# Patient Record
Sex: FEMALE | Race: WHITE | Employment: OTHER | URBAN - NONMETROPOLITAN AREA
[De-identification: names, ages, dates, MRNs, and addresses within clinical notes are randomized per-mention and may not be internally consistent; named-entity substitution may affect disease eponyms.]

---

## 2023-06-24 ENCOUNTER — APPOINTMENT (OUTPATIENT)
Dept: CT IMAGING | Age: 88
DRG: 308 | End: 2023-06-24
Payer: MEDICARE

## 2023-06-24 ENCOUNTER — HOSPITAL ENCOUNTER (INPATIENT)
Age: 88
LOS: 3 days | Discharge: HOME HEALTH | DRG: 308 | End: 2023-06-27
Attending: EMERGENCY MEDICINE | Admitting: OPHTHALMOLOGY
Payer: MEDICARE

## 2023-06-24 ENCOUNTER — APPOINTMENT (OUTPATIENT)
Dept: GENERAL RADIOLOGY | Age: 88
DRG: 308 | End: 2023-06-24
Payer: MEDICARE

## 2023-06-24 DIAGNOSIS — J90 PLEURAL EFFUSION: ICD-10-CM

## 2023-06-24 DIAGNOSIS — I48.91 ATRIAL FIBRILLATION WITH RVR (HCC): Primary | ICD-10-CM

## 2023-06-24 DIAGNOSIS — I50.31 ACUTE DIASTOLIC CONGESTIVE HEART FAILURE (HCC): ICD-10-CM

## 2023-06-24 LAB
ALBUMIN SERPL BCG-MCNC: 3.7 G/DL (ref 3.5–5.1)
ALP SERPL-CCNC: 81 U/L (ref 38–126)
ALT SERPL W/O P-5'-P-CCNC: 103 U/L (ref 11–66)
ANION GAP SERPL CALC-SCNC: 14 MEQ/L (ref 8–16)
APTT PPP: 27.9 SECONDS (ref 22–38)
ARTERIAL PATENCY WRIST A: POSITIVE
AST SERPL-CCNC: 57 U/L (ref 5–40)
BACTERIA URNS QL MICRO: ABNORMAL /HPF
BASE EXCESS BLDA CALC-SCNC: -0.5 MMOL/L (ref -2.5–2.5)
BASOPHILS ABSOLUTE: 0.1 THOU/MM3 (ref 0–0.1)
BASOPHILS NFR BLD AUTO: 0.8 %
BDY SITE: ABNORMAL
BILIRUB CONJ SERPL-MCNC: < 0.2 MG/DL (ref 0–0.3)
BILIRUB SERPL-MCNC: 0.9 MG/DL (ref 0.3–1.2)
BILIRUB UR QL STRIP.AUTO: NEGATIVE
BUN SERPL-MCNC: 19 MG/DL (ref 7–22)
CALCIUM SERPL-MCNC: 9.3 MG/DL (ref 8.5–10.5)
CASTS #/AREA URNS LPF: ABNORMAL /LPF
CASTS 2: ABNORMAL /LPF
CHARACTER UR: CLEAR
CHLORIDE SERPL-SCNC: 96 MEQ/L (ref 98–111)
CO2 SERPL-SCNC: 23 MEQ/L (ref 23–33)
COLLECTED BY:: ABNORMAL
COLOR: YELLOW
CREAT SERPL-MCNC: 0.7 MG/DL (ref 0.4–1.2)
CRYSTALS URNS MICRO: ABNORMAL
DEPRECATED RDW RBC AUTO: 56.3 FL (ref 35–45)
DEVICE: ABNORMAL
EKG Q-T INTERVAL: 294 MS
EKG QRS DURATION: 82 MS
EKG QTC CALCULATION (BAZETT): 388 MS
EKG R AXIS: -119 DEGREES
EKG T AXIS: 76 DEGREES
EKG VENTRICULAR RATE: 105 BPM
EOSINOPHIL NFR BLD AUTO: 3.3 %
EOSINOPHILS ABSOLUTE: 0.3 THOU/MM3 (ref 0–0.4)
EPITHELIAL CELLS, UA: ABNORMAL /HPF
ERYTHROCYTE [DISTWIDTH] IN BLOOD BY AUTOMATED COUNT: 15.9 % (ref 11.5–14.5)
FLUAV RNA RESP QL NAA+PROBE: NOT DETECTED
FLUBV RNA RESP QL NAA+PROBE: NOT DETECTED
GFR SERPL CREATININE-BSD FRML MDRD: > 60 ML/MIN/1.73M2
GLUCOSE SERPL-MCNC: 120 MG/DL (ref 70–108)
GLUCOSE UR QL STRIP.AUTO: NEGATIVE MG/DL
HCO3 BLDA-SCNC: 22 MMOL/L (ref 23–28)
HCT VFR BLD AUTO: 44.6 % (ref 37–47)
HEPARIN UNFRACTIONATED: 0.05 U/ML (ref 0.3–0.7)
HEPARIN UNFRACTIONATED: 0.56 U/ML (ref 0.3–0.7)
HGB BLD-MCNC: 14.4 GM/DL (ref 12–16)
HGB UR QL STRIP.AUTO: ABNORMAL
IMM GRANULOCYTES # BLD AUTO: 0.04 THOU/MM3 (ref 0–0.07)
IMM GRANULOCYTES NFR BLD AUTO: 0.5 %
INR PPP: 0.99 (ref 0.85–1.13)
KETONES UR QL STRIP.AUTO: NEGATIVE
LIPASE SERPL-CCNC: 71.1 U/L (ref 5.6–51.3)
LYMPHOCYTES ABSOLUTE: 0.9 THOU/MM3 (ref 1–4.8)
LYMPHOCYTES NFR BLD AUTO: 11.5 %
MCH RBC QN AUTO: 31.2 PG (ref 26–33)
MCHC RBC AUTO-ENTMCNC: 32.3 GM/DL (ref 32.2–35.5)
MCV RBC AUTO: 96.7 FL (ref 81–99)
MISCELLANEOUS 2: ABNORMAL
MONOCYTES ABSOLUTE: 0.5 THOU/MM3 (ref 0.4–1.3)
MONOCYTES NFR BLD AUTO: 5.9 %
MRSA DNA SPEC QL NAA+PROBE: NEGATIVE
NEUTROPHILS NFR BLD AUTO: 78 %
NITRITE UR QL STRIP: POSITIVE
NRBC BLD AUTO-RTO: 0 /100 WBC
NT-PROBNP SERPL IA-MCNC: 4451 PG/ML (ref 0–449)
OSMOLALITY SERPL CALC.SUM OF ELEC: 269.8 MOSMOL/KG (ref 275–300)
PCO2 BLDA: 30 MMHG (ref 35–45)
PH BLDA: 7.48 [PH] (ref 7.35–7.45)
PH UR STRIP.AUTO: 7.5 [PH] (ref 5–9)
PLATELET # BLD AUTO: 250 THOU/MM3 (ref 130–400)
PMV BLD AUTO: 9.8 FL (ref 9.4–12.4)
PO2 BLDA: 78 MMHG (ref 71–104)
POTASSIUM SERPL-SCNC: 4.4 MEQ/L (ref 3.5–5.2)
PROCALCITONIN SERPL IA-MCNC: 0.1 NG/ML (ref 0.01–0.09)
PROT SERPL-MCNC: 5.4 G/DL (ref 6.1–8)
PROT UR STRIP.AUTO-MCNC: NEGATIVE MG/DL
RBC # BLD AUTO: 4.61 MILL/MM3 (ref 4.2–5.4)
RBC URINE: ABNORMAL /HPF
RENAL EPI CELLS #/AREA URNS HPF: ABNORMAL /[HPF]
SAO2 % BLDA: 97 %
SARS-COV-2 RNA RESP QL NAA+PROBE: NOT DETECTED
SEGMENTED NEUTROPHILS ABSOLUTE COUNT: 6 THOU/MM3 (ref 1.8–7.7)
SODIUM SERPL-SCNC: 133 MEQ/L (ref 135–145)
SP GR UR REFRACT.AUTO: 1.01 (ref 1–1.03)
T4 FREE SERPL-MCNC: 1.34 NG/DL (ref 0.93–1.76)
TROPONIN T: < 0.01 NG/ML
TSH SERPL DL<=0.005 MIU/L-ACNC: 4.46 UIU/ML (ref 0.4–4.2)
UROBILINOGEN, URINE: 0.2 EU/DL (ref 0–1)
WBC # BLD AUTO: 7.7 THOU/MM3 (ref 4.8–10.8)
WBC #/AREA URNS HPF: ABNORMAL /HPF
WBC #/AREA URNS HPF: ABNORMAL /[HPF]
YEAST LIKE FUNGI URNS QL MICRO: ABNORMAL

## 2023-06-24 PROCEDURE — 85610 PROTHROMBIN TIME: CPT

## 2023-06-24 PROCEDURE — 84145 PROCALCITONIN (PCT): CPT

## 2023-06-24 PROCEDURE — 93005 ELECTROCARDIOGRAM TRACING: CPT | Performed by: EMERGENCY MEDICINE

## 2023-06-24 PROCEDURE — 2060000000 HC ICU INTERMEDIATE R&B

## 2023-06-24 PROCEDURE — 85520 HEPARIN ASSAY: CPT

## 2023-06-24 PROCEDURE — 6360000002 HC RX W HCPCS: Performed by: STUDENT IN AN ORGANIZED HEALTH CARE EDUCATION/TRAINING PROGRAM

## 2023-06-24 PROCEDURE — 36415 COLL VENOUS BLD VENIPUNCTURE: CPT

## 2023-06-24 PROCEDURE — 85730 THROMBOPLASTIN TIME PARTIAL: CPT

## 2023-06-24 PROCEDURE — 85025 COMPLETE CBC W/AUTO DIFF WBC: CPT

## 2023-06-24 PROCEDURE — 82803 BLOOD GASES ANY COMBINATION: CPT

## 2023-06-24 PROCEDURE — 36600 WITHDRAWAL OF ARTERIAL BLOOD: CPT

## 2023-06-24 PROCEDURE — 87641 MR-STAPH DNA AMP PROBE: CPT

## 2023-06-24 PROCEDURE — 2580000003 HC RX 258: Performed by: EMERGENCY MEDICINE

## 2023-06-24 PROCEDURE — 71046 X-RAY EXAM CHEST 2 VIEWS: CPT

## 2023-06-24 PROCEDURE — 84484 ASSAY OF TROPONIN QUANT: CPT

## 2023-06-24 PROCEDURE — 83880 ASSAY OF NATRIURETIC PEPTIDE: CPT

## 2023-06-24 PROCEDURE — 80048 BASIC METABOLIC PNL TOTAL CA: CPT

## 2023-06-24 PROCEDURE — 84439 ASSAY OF FREE THYROXINE: CPT

## 2023-06-24 PROCEDURE — 87077 CULTURE AEROBIC IDENTIFY: CPT

## 2023-06-24 PROCEDURE — 87040 BLOOD CULTURE FOR BACTERIA: CPT

## 2023-06-24 PROCEDURE — 87186 SC STD MICRODIL/AGAR DIL: CPT

## 2023-06-24 PROCEDURE — 87086 URINE CULTURE/COLONY COUNT: CPT

## 2023-06-24 PROCEDURE — 2500000003 HC RX 250 WO HCPCS: Performed by: EMERGENCY MEDICINE

## 2023-06-24 PROCEDURE — 80076 HEPATIC FUNCTION PANEL: CPT

## 2023-06-24 PROCEDURE — 71275 CT ANGIOGRAPHY CHEST: CPT

## 2023-06-24 PROCEDURE — 87636 SARSCOV2 & INF A&B AMP PRB: CPT

## 2023-06-24 PROCEDURE — 6360000004 HC RX CONTRAST MEDICATION: Performed by: EMERGENCY MEDICINE

## 2023-06-24 PROCEDURE — 84443 ASSAY THYROID STIM HORMONE: CPT

## 2023-06-24 PROCEDURE — 6370000000 HC RX 637 (ALT 250 FOR IP): Performed by: STUDENT IN AN ORGANIZED HEALTH CARE EDUCATION/TRAINING PROGRAM

## 2023-06-24 PROCEDURE — 99285 EMERGENCY DEPT VISIT HI MDM: CPT

## 2023-06-24 PROCEDURE — 93010 ELECTROCARDIOGRAM REPORT: CPT | Performed by: INTERNAL MEDICINE

## 2023-06-24 PROCEDURE — 96374 THER/PROPH/DIAG INJ IV PUSH: CPT

## 2023-06-24 PROCEDURE — 87070 CULTURE OTHR SPECIMN AEROBIC: CPT

## 2023-06-24 PROCEDURE — 6360000002 HC RX W HCPCS: Performed by: EMERGENCY MEDICINE

## 2023-06-24 PROCEDURE — 83690 ASSAY OF LIPASE: CPT

## 2023-06-24 PROCEDURE — 81001 URINALYSIS AUTO W/SCOPE: CPT

## 2023-06-24 RX ORDER — HEPARIN SODIUM 1000 [USP'U]/ML
4000 INJECTION, SOLUTION INTRAVENOUS; SUBCUTANEOUS ONCE
Status: COMPLETED | OUTPATIENT
Start: 2023-06-24 | End: 2023-06-24

## 2023-06-24 RX ORDER — ONDANSETRON 2 MG/ML
4 INJECTION INTRAMUSCULAR; INTRAVENOUS EVERY 6 HOURS PRN
Status: DISCONTINUED | OUTPATIENT
Start: 2023-06-24 | End: 2023-06-27 | Stop reason: HOSPADM

## 2023-06-24 RX ORDER — ACETAMINOPHEN 650 MG/1
650 SUPPOSITORY RECTAL EVERY 6 HOURS PRN
Status: DISCONTINUED | OUTPATIENT
Start: 2023-06-24 | End: 2023-06-27 | Stop reason: HOSPADM

## 2023-06-24 RX ORDER — ACETAMINOPHEN 325 MG/1
650 TABLET ORAL EVERY 6 HOURS PRN
Status: DISCONTINUED | OUTPATIENT
Start: 2023-06-24 | End: 2023-06-27 | Stop reason: HOSPADM

## 2023-06-24 RX ORDER — KETOTIFEN FUMARATE 0.35 MG/ML
1 SOLUTION/ DROPS OPHTHALMIC PRN
Status: DISCONTINUED | OUTPATIENT
Start: 2023-06-24 | End: 2023-06-27 | Stop reason: HOSPADM

## 2023-06-24 RX ORDER — HEPARIN SODIUM 1000 [USP'U]/ML
4000 INJECTION, SOLUTION INTRAVENOUS; SUBCUTANEOUS PRN
Status: DISCONTINUED | OUTPATIENT
Start: 2023-06-24 | End: 2023-06-27 | Stop reason: HOSPADM

## 2023-06-24 RX ORDER — METOPROLOL TARTRATE 50 MG/1
50 TABLET, FILM COATED ORAL 2 TIMES DAILY
COMMUNITY

## 2023-06-24 RX ORDER — METOPROLOL TARTRATE 50 MG/1
50 TABLET, FILM COATED ORAL 2 TIMES DAILY
Status: DISCONTINUED | OUTPATIENT
Start: 2023-06-24 | End: 2023-06-27 | Stop reason: HOSPADM

## 2023-06-24 RX ORDER — HEPARIN SODIUM 10000 [USP'U]/100ML
5-30 INJECTION, SOLUTION INTRAVENOUS CONTINUOUS
Status: DISCONTINUED | OUTPATIENT
Start: 2023-06-24 | End: 2023-06-27 | Stop reason: HOSPADM

## 2023-06-24 RX ORDER — POLYETHYLENE GLYCOL 3350 17 G/17G
17 POWDER, FOR SOLUTION ORAL DAILY PRN
Status: DISCONTINUED | OUTPATIENT
Start: 2023-06-24 | End: 2023-06-27 | Stop reason: HOSPADM

## 2023-06-24 RX ORDER — LOSARTAN POTASSIUM 100 MG/1
100 TABLET ORAL DAILY
Status: ON HOLD | COMMUNITY
End: 2023-06-27 | Stop reason: HOSPADM

## 2023-06-24 RX ORDER — SODIUM CHLORIDE 9 MG/ML
INJECTION, SOLUTION INTRAVENOUS PRN
Status: DISCONTINUED | OUTPATIENT
Start: 2023-06-24 | End: 2023-06-27

## 2023-06-24 RX ORDER — TRIAMCINOLONE ACETONIDE 1 MG/G
CREAM TOPICAL 2 TIMES DAILY
COMMUNITY

## 2023-06-24 RX ORDER — CHLORAL HYDRATE 500 MG
CAPSULE ORAL DAILY
COMMUNITY

## 2023-06-24 RX ORDER — ONDANSETRON 4 MG/1
4 TABLET, ORALLY DISINTEGRATING ORAL EVERY 8 HOURS PRN
Status: DISCONTINUED | OUTPATIENT
Start: 2023-06-24 | End: 2023-06-27 | Stop reason: HOSPADM

## 2023-06-24 RX ORDER — HEPARIN SODIUM 1000 [USP'U]/ML
2000 INJECTION, SOLUTION INTRAVENOUS; SUBCUTANEOUS PRN
Status: DISCONTINUED | OUTPATIENT
Start: 2023-06-24 | End: 2023-06-27 | Stop reason: HOSPADM

## 2023-06-24 RX ORDER — DILTIAZEM HYDROCHLORIDE 5 MG/ML
10 INJECTION INTRAVENOUS ONCE
Status: COMPLETED | OUTPATIENT
Start: 2023-06-24 | End: 2023-06-24

## 2023-06-24 RX ORDER — SODIUM CHLORIDE 0.9 % (FLUSH) 0.9 %
5-40 SYRINGE (ML) INJECTION PRN
Status: DISCONTINUED | OUTPATIENT
Start: 2023-06-24 | End: 2023-06-27 | Stop reason: HOSPADM

## 2023-06-24 RX ORDER — BRIMONIDINE TARTRATE 2 MG/ML
1 SOLUTION/ DROPS OPHTHALMIC 3 TIMES DAILY
Status: DISCONTINUED | OUTPATIENT
Start: 2023-06-24 | End: 2023-06-27 | Stop reason: HOSPADM

## 2023-06-24 RX ORDER — BRIMONIDINE TARTRATE 2 MG/ML
1 SOLUTION/ DROPS OPHTHALMIC 2 TIMES DAILY
Status: DISCONTINUED | OUTPATIENT
Start: 2023-06-24 | End: 2023-06-24

## 2023-06-24 RX ORDER — LOSARTAN POTASSIUM 100 MG/1
100 TABLET ORAL DAILY
Status: DISCONTINUED | OUTPATIENT
Start: 2023-06-25 | End: 2023-06-25

## 2023-06-24 RX ORDER — BRIMONIDINE TARTRATE AND TIMOLOL MALEATE 2; 5 MG/ML; MG/ML
1 SOLUTION OPHTHALMIC NIGHTLY
COMMUNITY

## 2023-06-24 RX ORDER — SODIUM CHLORIDE 0.9 % (FLUSH) 0.9 %
5-40 SYRINGE (ML) INJECTION EVERY 12 HOURS SCHEDULED
Status: DISCONTINUED | OUTPATIENT
Start: 2023-06-24 | End: 2023-06-27 | Stop reason: HOSPADM

## 2023-06-24 RX ORDER — FOLIC ACID/MULTIVIT,IRON,MINER .4-18-35
TABLET,CHEWABLE ORAL DAILY
COMMUNITY

## 2023-06-24 RX ORDER — FUROSEMIDE 10 MG/ML
20 INJECTION INTRAMUSCULAR; INTRAVENOUS 2 TIMES DAILY
Status: COMPLETED | OUTPATIENT
Start: 2023-06-24 | End: 2023-06-25

## 2023-06-24 RX ADMIN — HEPARIN SODIUM 4000 UNITS: 1000 INJECTION INTRAVENOUS; SUBCUTANEOUS at 16:18

## 2023-06-24 RX ADMIN — BRIMONIDINE TARTRATE 1 DROP: 2 SOLUTION/ DROPS OPHTHALMIC at 19:58

## 2023-06-24 RX ADMIN — METOPROLOL TARTRATE 50 MG: 50 TABLET, FILM COATED ORAL at 19:57

## 2023-06-24 RX ADMIN — FUROSEMIDE 20 MG: 10 INJECTION, SOLUTION INTRAMUSCULAR; INTRAVENOUS at 20:05

## 2023-06-24 RX ADMIN — DILTIAZEM HYDROCHLORIDE 10 MG: 5 INJECTION INTRAVENOUS at 15:07

## 2023-06-24 RX ADMIN — HEPARIN SODIUM 12 UNITS/KG/HR: 10000 INJECTION, SOLUTION INTRAVENOUS at 16:22

## 2023-06-24 RX ADMIN — DILTIAZEM HYDROCHLORIDE 5 MG/HR: 5 INJECTION INTRAVENOUS at 15:09

## 2023-06-24 RX ADMIN — IOPAMIDOL 80 ML: 755 INJECTION, SOLUTION INTRAVENOUS at 16:30

## 2023-06-24 ASSESSMENT — PAIN DESCRIPTION - LOCATION: LOCATION: OTHER (COMMENT)

## 2023-06-24 ASSESSMENT — PAIN - FUNCTIONAL ASSESSMENT: PAIN_FUNCTIONAL_ASSESSMENT: NONE - DENIES PAIN

## 2023-06-25 ENCOUNTER — APPOINTMENT (OUTPATIENT)
Dept: GENERAL RADIOLOGY | Age: 88
DRG: 308 | End: 2023-06-25
Payer: MEDICARE

## 2023-06-25 ENCOUNTER — APPOINTMENT (OUTPATIENT)
Dept: INTERVENTIONAL RADIOLOGY/VASCULAR | Age: 88
DRG: 308 | End: 2023-06-25
Payer: MEDICARE

## 2023-06-25 LAB
ALBUMIN SERPL BCG-MCNC: 3.4 G/DL (ref 3.5–5.1)
ALP SERPL-CCNC: 68 U/L (ref 38–126)
ALT SERPL W/O P-5'-P-CCNC: 92 U/L (ref 11–66)
ANION GAP SERPL CALC-SCNC: 12 MEQ/L (ref 8–16)
AST SERPL-CCNC: 44 U/L (ref 5–40)
BILIRUB CONJ SERPL-MCNC: < 0.2 MG/DL (ref 0–0.3)
BILIRUB SERPL-MCNC: 0.7 MG/DL (ref 0.3–1.2)
BUN SERPL-MCNC: 18 MG/DL (ref 7–22)
CALCIUM SERPL-MCNC: 9.2 MG/DL (ref 8.5–10.5)
CHLORIDE SERPL-SCNC: 100 MEQ/L (ref 98–111)
CO2 SERPL-SCNC: 24 MEQ/L (ref 23–33)
CREAT SERPL-MCNC: 0.9 MG/DL (ref 0.4–1.2)
DEPRECATED RDW RBC AUTO: 57.2 FL (ref 35–45)
ERYTHROCYTE [DISTWIDTH] IN BLOOD BY AUTOMATED COUNT: 15.9 % (ref 11.5–14.5)
GFR SERPL CREATININE-BSD FRML MDRD: > 60 ML/MIN/1.73M2
GLUCOSE SERPL-MCNC: 112 MG/DL (ref 70–108)
HCT VFR BLD AUTO: 39.5 % (ref 37–47)
HEPARIN UNFRACTIONATED: 0.55 U/ML (ref 0.3–0.7)
HGB BLD-MCNC: 12.6 GM/DL (ref 12–16)
MAGNESIUM SERPL-MCNC: 1.8 MG/DL (ref 1.6–2.4)
MCH RBC QN AUTO: 31.2 PG (ref 26–33)
MCHC RBC AUTO-ENTMCNC: 31.9 GM/DL (ref 32.2–35.5)
MCV RBC AUTO: 97.8 FL (ref 81–99)
PHOSPHATE SERPL-MCNC: 4.2 MG/DL (ref 2.4–4.7)
PLATELET # BLD AUTO: 200 THOU/MM3 (ref 130–400)
PMV BLD AUTO: 9.7 FL (ref 9.4–12.4)
POTASSIUM SERPL-SCNC: 4.2 MEQ/L (ref 3.5–5.2)
PROT SERPL-MCNC: 4.9 G/DL (ref 6.1–8)
RBC # BLD AUTO: 4.04 MILL/MM3 (ref 4.2–5.4)
SODIUM SERPL-SCNC: 136 MEQ/L (ref 135–145)
WBC # BLD AUTO: 8.3 THOU/MM3 (ref 4.8–10.8)

## 2023-06-25 PROCEDURE — 6360000002 HC RX W HCPCS: Performed by: EMERGENCY MEDICINE

## 2023-06-25 PROCEDURE — 2580000003 HC RX 258: Performed by: EMERGENCY MEDICINE

## 2023-06-25 PROCEDURE — 6370000000 HC RX 637 (ALT 250 FOR IP): Performed by: INTERNAL MEDICINE

## 2023-06-25 PROCEDURE — 85520 HEPARIN ASSAY: CPT

## 2023-06-25 PROCEDURE — 6360000002 HC RX W HCPCS: Performed by: STUDENT IN AN ORGANIZED HEALTH CARE EDUCATION/TRAINING PROGRAM

## 2023-06-25 PROCEDURE — 83735 ASSAY OF MAGNESIUM: CPT

## 2023-06-25 PROCEDURE — 93970 EXTREMITY STUDY: CPT

## 2023-06-25 PROCEDURE — 99223 1ST HOSP IP/OBS HIGH 75: CPT | Performed by: INTERNAL MEDICINE

## 2023-06-25 PROCEDURE — 6360000002 HC RX W HCPCS: Performed by: INTERNAL MEDICINE

## 2023-06-25 PROCEDURE — 84100 ASSAY OF PHOSPHORUS: CPT

## 2023-06-25 PROCEDURE — 85027 COMPLETE CBC AUTOMATED: CPT

## 2023-06-25 PROCEDURE — 2580000003 HC RX 258: Performed by: STUDENT IN AN ORGANIZED HEALTH CARE EDUCATION/TRAINING PROGRAM

## 2023-06-25 PROCEDURE — 2500000003 HC RX 250 WO HCPCS: Performed by: EMERGENCY MEDICINE

## 2023-06-25 PROCEDURE — 36415 COLL VENOUS BLD VENIPUNCTURE: CPT

## 2023-06-25 PROCEDURE — 80048 BASIC METABOLIC PNL TOTAL CA: CPT

## 2023-06-25 PROCEDURE — 6370000000 HC RX 637 (ALT 250 FOR IP): Performed by: STUDENT IN AN ORGANIZED HEALTH CARE EDUCATION/TRAINING PROGRAM

## 2023-06-25 PROCEDURE — 80076 HEPATIC FUNCTION PANEL: CPT

## 2023-06-25 PROCEDURE — 2060000000 HC ICU INTERMEDIATE R&B

## 2023-06-25 PROCEDURE — 99232 SBSQ HOSP IP/OBS MODERATE 35: CPT | Performed by: INTERNAL MEDICINE

## 2023-06-25 RX ORDER — LOSARTAN POTASSIUM 25 MG/1
25 TABLET ORAL DAILY
Status: DISCONTINUED | OUTPATIENT
Start: 2023-06-26 | End: 2023-06-27 | Stop reason: HOSPADM

## 2023-06-25 RX ORDER — FUROSEMIDE 10 MG/ML
20 INJECTION INTRAMUSCULAR; INTRAVENOUS 2 TIMES DAILY
Status: COMPLETED | OUTPATIENT
Start: 2023-06-25 | End: 2023-06-26

## 2023-06-25 RX ORDER — DILTIAZEM HYDROCHLORIDE 120 MG/1
120 CAPSULE, COATED, EXTENDED RELEASE ORAL DAILY
Status: DISCONTINUED | OUTPATIENT
Start: 2023-06-25 | End: 2023-06-27 | Stop reason: HOSPADM

## 2023-06-25 RX ORDER — POTASSIUM CHLORIDE 750 MG/1
10 TABLET, FILM COATED, EXTENDED RELEASE ORAL 2 TIMES DAILY
Status: COMPLETED | OUTPATIENT
Start: 2023-06-25 | End: 2023-06-26

## 2023-06-25 RX ORDER — MAGNESIUM SULFATE 1 G/100ML
1000 INJECTION INTRAVENOUS ONCE
Status: COMPLETED | OUTPATIENT
Start: 2023-06-25 | End: 2023-06-25

## 2023-06-25 RX ADMIN — METOPROLOL TARTRATE 50 MG: 50 TABLET, FILM COATED ORAL at 08:49

## 2023-06-25 RX ADMIN — BRIMONIDINE TARTRATE 1 DROP: 2 SOLUTION/ DROPS OPHTHALMIC at 21:15

## 2023-06-25 RX ADMIN — FUROSEMIDE 20 MG: 10 INJECTION, SOLUTION INTRAMUSCULAR; INTRAVENOUS at 08:49

## 2023-06-25 RX ADMIN — METOPROLOL TARTRATE 50 MG: 50 TABLET, FILM COATED ORAL at 21:14

## 2023-06-25 RX ADMIN — CEFTRIAXONE SODIUM 1000 MG: 1 INJECTION, POWDER, FOR SOLUTION INTRAMUSCULAR; INTRAVENOUS at 17:07

## 2023-06-25 RX ADMIN — FUROSEMIDE 20 MG: 10 INJECTION, SOLUTION INTRAMUSCULAR; INTRAVENOUS at 19:03

## 2023-06-25 RX ADMIN — HEPARIN SODIUM 12 UNITS/KG/HR: 10000 INJECTION, SOLUTION INTRAVENOUS at 19:42

## 2023-06-25 RX ADMIN — MAGNESIUM SULFATE HEPTAHYDRATE 1000 MG: 1 INJECTION, SOLUTION INTRAVENOUS at 13:17

## 2023-06-25 RX ADMIN — DILTIAZEM HYDROCHLORIDE 5 MG/HR: 5 INJECTION INTRAVENOUS at 16:17

## 2023-06-25 RX ADMIN — BRIMONIDINE TARTRATE 1 DROP: 2 SOLUTION/ DROPS OPHTHALMIC at 09:00

## 2023-06-25 RX ADMIN — POTASSIUM CHLORIDE 10 MEQ: 750 TABLET, EXTENDED RELEASE ORAL at 21:14

## 2023-06-25 RX ADMIN — BRIMONIDINE TARTRATE 1 DROP: 2 SOLUTION/ DROPS OPHTHALMIC at 14:44

## 2023-06-25 RX ADMIN — LOSARTAN POTASSIUM 100 MG: 100 TABLET, FILM COATED ORAL at 08:49

## 2023-06-25 RX ADMIN — DILTIAZEM HYDROCHLORIDE 120 MG: 120 CAPSULE, COATED, EXTENDED RELEASE ORAL at 16:18

## 2023-06-25 ASSESSMENT — PAIN SCALES - GENERAL
PAINLEVEL_OUTOF10: 0
PAINLEVEL_OUTOF10: 0

## 2023-06-26 ENCOUNTER — APPOINTMENT (OUTPATIENT)
Dept: ULTRASOUND IMAGING | Age: 88
DRG: 308 | End: 2023-06-26
Payer: MEDICARE

## 2023-06-26 ENCOUNTER — APPOINTMENT (OUTPATIENT)
Dept: CT IMAGING | Age: 88
DRG: 308 | End: 2023-06-26
Payer: MEDICARE

## 2023-06-26 LAB
ANION GAP SERPL CALC-SCNC: 12 MEQ/L (ref 8–16)
BACTERIA SPEC AEROBE CULT: NORMAL
BACTERIA UR CULT: ABNORMAL
BUN SERPL-MCNC: 20 MG/DL (ref 7–22)
CALCIUM SERPL-MCNC: 8.8 MG/DL (ref 8.5–10.5)
CHLORIDE SERPL-SCNC: 98 MEQ/L (ref 98–111)
CO2 SERPL-SCNC: 23 MEQ/L (ref 23–33)
CREAT SERPL-MCNC: 0.9 MG/DL (ref 0.4–1.2)
DEPRECATED RDW RBC AUTO: 55.1 FL (ref 35–45)
ERYTHROCYTE [DISTWIDTH] IN BLOOD BY AUTOMATED COUNT: 15.9 % (ref 11.5–14.5)
GFR SERPL CREATININE-BSD FRML MDRD: > 60 ML/MIN/1.73M2
GLUCOSE SERPL-MCNC: 117 MG/DL (ref 70–108)
HCT VFR BLD AUTO: 37.3 % (ref 37–47)
HEPARIN UNFRACTIONATED: 0.41 U/ML (ref 0.3–0.7)
HGB BLD-MCNC: 12.4 GM/DL (ref 12–16)
MAGNESIUM SERPL-MCNC: 1.8 MG/DL (ref 1.6–2.4)
MCH RBC QN AUTO: 31.3 PG (ref 26–33)
MCHC RBC AUTO-ENTMCNC: 33.2 GM/DL (ref 32.2–35.5)
MCV RBC AUTO: 94.2 FL (ref 81–99)
ORGANISM: ABNORMAL
PLATELET # BLD AUTO: 206 THOU/MM3 (ref 130–400)
PMV BLD AUTO: 10.3 FL (ref 9.4–12.4)
POTASSIUM SERPL-SCNC: 3.7 MEQ/L (ref 3.5–5.2)
RBC # BLD AUTO: 3.96 MILL/MM3 (ref 4.2–5.4)
SODIUM SERPL-SCNC: 133 MEQ/L (ref 135–145)
WBC # BLD AUTO: 7.8 THOU/MM3 (ref 4.8–10.8)

## 2023-06-26 PROCEDURE — 6370000000 HC RX 637 (ALT 250 FOR IP): Performed by: STUDENT IN AN ORGANIZED HEALTH CARE EDUCATION/TRAINING PROGRAM

## 2023-06-26 PROCEDURE — 77012 CT SCAN FOR NEEDLE BIOPSY: CPT

## 2023-06-26 PROCEDURE — 6360000002 HC RX W HCPCS: Performed by: INTERNAL MEDICINE

## 2023-06-26 PROCEDURE — 2580000003 HC RX 258: Performed by: STUDENT IN AN ORGANIZED HEALTH CARE EDUCATION/TRAINING PROGRAM

## 2023-06-26 PROCEDURE — 6360000002 HC RX W HCPCS: Performed by: STUDENT IN AN ORGANIZED HEALTH CARE EDUCATION/TRAINING PROGRAM

## 2023-06-26 PROCEDURE — 97535 SELF CARE MNGMENT TRAINING: CPT

## 2023-06-26 PROCEDURE — 2060000000 HC ICU INTERMEDIATE R&B

## 2023-06-26 PROCEDURE — 32555 ASPIRATE PLEURA W/ IMAGING: CPT

## 2023-06-26 PROCEDURE — 83735 ASSAY OF MAGNESIUM: CPT

## 2023-06-26 PROCEDURE — 2500000003 HC RX 250 WO HCPCS: Performed by: INTERNAL MEDICINE

## 2023-06-26 PROCEDURE — 99232 SBSQ HOSP IP/OBS MODERATE 35: CPT | Performed by: INTERNAL MEDICINE

## 2023-06-26 PROCEDURE — 80048 BASIC METABOLIC PNL TOTAL CA: CPT

## 2023-06-26 PROCEDURE — 85520 HEPARIN ASSAY: CPT

## 2023-06-26 PROCEDURE — 6370000000 HC RX 637 (ALT 250 FOR IP): Performed by: INTERNAL MEDICINE

## 2023-06-26 PROCEDURE — 85027 COMPLETE CBC AUTOMATED: CPT

## 2023-06-26 PROCEDURE — 36415 COLL VENOUS BLD VENIPUNCTURE: CPT

## 2023-06-26 PROCEDURE — 6370000000 HC RX 637 (ALT 250 FOR IP)

## 2023-06-26 PROCEDURE — 99232 SBSQ HOSP IP/OBS MODERATE 35: CPT | Performed by: STUDENT IN AN ORGANIZED HEALTH CARE EDUCATION/TRAINING PROGRAM

## 2023-06-26 PROCEDURE — 76604 US EXAM CHEST: CPT

## 2023-06-26 PROCEDURE — 2500000003 HC RX 250 WO HCPCS

## 2023-06-26 PROCEDURE — 97166 OT EVAL MOD COMPLEX 45 MIN: CPT

## 2023-06-26 RX ORDER — BUMETANIDE 0.25 MG/ML
1 INJECTION INTRAMUSCULAR; INTRAVENOUS 2 TIMES DAILY
Status: DISCONTINUED | OUTPATIENT
Start: 2023-06-26 | End: 2023-06-27

## 2023-06-26 RX ORDER — MAGNESIUM SULFATE HEPTAHYDRATE 40 MG/ML
2000 INJECTION, SOLUTION INTRAVENOUS ONCE
Status: COMPLETED | OUTPATIENT
Start: 2023-06-26 | End: 2023-06-26

## 2023-06-26 RX ORDER — BUMETANIDE 0.25 MG/ML
1 INJECTION INTRAMUSCULAR; INTRAVENOUS ONCE
Status: COMPLETED | OUTPATIENT
Start: 2023-06-26 | End: 2023-06-26

## 2023-06-26 RX ORDER — BUMETANIDE 0.25 MG/ML
1 INJECTION INTRAMUSCULAR; INTRAVENOUS ONCE
Status: DISCONTINUED | OUTPATIENT
Start: 2023-06-26 | End: 2023-06-26

## 2023-06-26 RX ORDER — POTASSIUM CHLORIDE 20 MEQ/1
20 TABLET, EXTENDED RELEASE ORAL ONCE
Status: COMPLETED | OUTPATIENT
Start: 2023-06-26 | End: 2023-06-26

## 2023-06-26 RX ADMIN — SODIUM CHLORIDE, PRESERVATIVE FREE 10 ML: 5 INJECTION INTRAVENOUS at 20:08

## 2023-06-26 RX ADMIN — METOPROLOL TARTRATE 50 MG: 50 TABLET, FILM COATED ORAL at 10:11

## 2023-06-26 RX ADMIN — POTASSIUM CHLORIDE 20 MEQ: 1500 TABLET, EXTENDED RELEASE ORAL at 10:14

## 2023-06-26 RX ADMIN — MAGNESIUM SULFATE HEPTAHYDRATE 2000 MG: 40 INJECTION, SOLUTION INTRAVENOUS at 13:21

## 2023-06-26 RX ADMIN — LOSARTAN POTASSIUM 25 MG: 25 TABLET, FILM COATED ORAL at 10:11

## 2023-06-26 RX ADMIN — SODIUM CHLORIDE, PRESERVATIVE FREE 10 ML: 5 INJECTION INTRAVENOUS at 10:14

## 2023-06-26 RX ADMIN — CEFTRIAXONE SODIUM 1000 MG: 1 INJECTION, POWDER, FOR SOLUTION INTRAMUSCULAR; INTRAVENOUS at 17:37

## 2023-06-26 RX ADMIN — BUMETANIDE 1 MG: 0.25 INJECTION INTRAMUSCULAR; INTRAVENOUS at 23:25

## 2023-06-26 RX ADMIN — BRIMONIDINE TARTRATE 1 DROP: 2 SOLUTION/ DROPS OPHTHALMIC at 10:15

## 2023-06-26 RX ADMIN — BRIMONIDINE TARTRATE 1 DROP: 2 SOLUTION/ DROPS OPHTHALMIC at 13:26

## 2023-06-26 RX ADMIN — FUROSEMIDE 20 MG: 10 INJECTION, SOLUTION INTRAMUSCULAR; INTRAVENOUS at 10:14

## 2023-06-26 RX ADMIN — BRIMONIDINE TARTRATE 1 DROP: 2 SOLUTION/ DROPS OPHTHALMIC at 20:08

## 2023-06-26 RX ADMIN — DILTIAZEM HYDROCHLORIDE 120 MG: 120 CAPSULE, COATED, EXTENDED RELEASE ORAL at 10:12

## 2023-06-26 RX ADMIN — METOPROLOL TARTRATE 50 MG: 50 TABLET, FILM COATED ORAL at 20:07

## 2023-06-26 RX ADMIN — POTASSIUM CHLORIDE 10 MEQ: 750 TABLET, EXTENDED RELEASE ORAL at 10:14

## 2023-06-26 RX ADMIN — BUMETANIDE 1 MG: 0.25 INJECTION INTRAMUSCULAR; INTRAVENOUS at 13:18

## 2023-06-26 ASSESSMENT — PAIN SCALES - GENERAL: PAINLEVEL_OUTOF10: 0

## 2023-06-27 VITALS
WEIGHT: 172.4 LBS | HEART RATE: 66 BPM | HEIGHT: 63 IN | BODY MASS INDEX: 30.55 KG/M2 | OXYGEN SATURATION: 97 % | TEMPERATURE: 97.7 F | SYSTOLIC BLOOD PRESSURE: 143 MMHG | RESPIRATION RATE: 18 BRPM | DIASTOLIC BLOOD PRESSURE: 83 MMHG

## 2023-06-27 LAB
ANION GAP SERPL CALC-SCNC: 13 MEQ/L (ref 8–16)
APTT PPP: 42.7 SECONDS (ref 22–38)
BUN SERPL-MCNC: 20 MG/DL (ref 7–22)
CALCIUM SERPL-MCNC: 9.1 MG/DL (ref 8.5–10.5)
CHLORIDE SERPL-SCNC: 96 MEQ/L (ref 98–111)
CO2 SERPL-SCNC: 25 MEQ/L (ref 23–33)
CREAT SERPL-MCNC: 1.2 MG/DL (ref 0.4–1.2)
DEPRECATED RDW RBC AUTO: 56.5 FL (ref 35–45)
ERYTHROCYTE [DISTWIDTH] IN BLOOD BY AUTOMATED COUNT: 15.9 % (ref 11.5–14.5)
GFR SERPL CREATININE-BSD FRML MDRD: 44 ML/MIN/1.73M2
GLUCOSE SERPL-MCNC: 111 MG/DL (ref 70–108)
HCT VFR BLD AUTO: 41.9 % (ref 37–47)
HEPARIN UNFRACTIONATED: 0.46 U/ML (ref 0.3–0.7)
HGB BLD-MCNC: 13.7 GM/DL (ref 12–16)
INR PPP: 0.98 (ref 0.85–1.13)
LV EF: 58 %
LVEF MODALITY: NORMAL
MAGNESIUM SERPL-MCNC: 2.2 MG/DL (ref 1.6–2.4)
MCH RBC QN AUTO: 31.2 PG (ref 26–33)
MCHC RBC AUTO-ENTMCNC: 32.7 GM/DL (ref 32.2–35.5)
MCV RBC AUTO: 95.4 FL (ref 81–99)
PLATELET # BLD AUTO: 219 THOU/MM3 (ref 130–400)
PMV BLD AUTO: 9.9 FL (ref 9.4–12.4)
POTASSIUM SERPL-SCNC: 4 MEQ/L (ref 3.5–5.2)
RBC # BLD AUTO: 4.39 MILL/MM3 (ref 4.2–5.4)
SODIUM SERPL-SCNC: 134 MEQ/L (ref 135–145)
WBC # BLD AUTO: 6.7 THOU/MM3 (ref 4.8–10.8)

## 2023-06-27 PROCEDURE — 93010 ELECTROCARDIOGRAM REPORT: CPT | Performed by: INTERNAL MEDICINE

## 2023-06-27 PROCEDURE — 99239 HOSP IP/OBS DSCHRG MGMT >30: CPT | Performed by: INTERNAL MEDICINE

## 2023-06-27 PROCEDURE — 6370000000 HC RX 637 (ALT 250 FOR IP): Performed by: STUDENT IN AN ORGANIZED HEALTH CARE EDUCATION/TRAINING PROGRAM

## 2023-06-27 PROCEDURE — 93005 ELECTROCARDIOGRAM TRACING: CPT | Performed by: STUDENT IN AN ORGANIZED HEALTH CARE EDUCATION/TRAINING PROGRAM

## 2023-06-27 PROCEDURE — 6360000002 HC RX W HCPCS: Performed by: EMERGENCY MEDICINE

## 2023-06-27 PROCEDURE — 85730 THROMBOPLASTIN TIME PARTIAL: CPT

## 2023-06-27 PROCEDURE — 85610 PROTHROMBIN TIME: CPT

## 2023-06-27 PROCEDURE — 99232 SBSQ HOSP IP/OBS MODERATE 35: CPT | Performed by: STUDENT IN AN ORGANIZED HEALTH CARE EDUCATION/TRAINING PROGRAM

## 2023-06-27 PROCEDURE — 83735 ASSAY OF MAGNESIUM: CPT

## 2023-06-27 PROCEDURE — 85027 COMPLETE CBC AUTOMATED: CPT

## 2023-06-27 PROCEDURE — 80048 BASIC METABOLIC PNL TOTAL CA: CPT

## 2023-06-27 PROCEDURE — 93306 TTE W/DOPPLER COMPLETE: CPT

## 2023-06-27 PROCEDURE — 97162 PT EVAL MOD COMPLEX 30 MIN: CPT

## 2023-06-27 PROCEDURE — 36415 COLL VENOUS BLD VENIPUNCTURE: CPT

## 2023-06-27 PROCEDURE — 85520 HEPARIN ASSAY: CPT

## 2023-06-27 PROCEDURE — 97530 THERAPEUTIC ACTIVITIES: CPT

## 2023-06-27 PROCEDURE — B24BZZ4 ULTRASONOGRAPHY OF HEART WITH AORTA, TRANSESOPHAGEAL: ICD-10-PCS | Performed by: INTERNAL MEDICINE

## 2023-06-27 PROCEDURE — 97110 THERAPEUTIC EXERCISES: CPT

## 2023-06-27 PROCEDURE — 6370000000 HC RX 637 (ALT 250 FOR IP): Performed by: INTERNAL MEDICINE

## 2023-06-27 PROCEDURE — 2500000003 HC RX 250 WO HCPCS: Performed by: INTERNAL MEDICINE

## 2023-06-27 RX ORDER — CEFDINIR 300 MG/1
300 CAPSULE ORAL 2 TIMES DAILY
Qty: 6 CAPSULE | Refills: 0 | Status: SHIPPED | OUTPATIENT
Start: 2023-06-27 | End: 2023-06-30

## 2023-06-27 RX ORDER — SODIUM CHLORIDE 0.9 % (FLUSH) 0.9 %
5-40 SYRINGE (ML) INJECTION EVERY 12 HOURS SCHEDULED
Status: DISCONTINUED | OUTPATIENT
Start: 2023-06-27 | End: 2023-06-27

## 2023-06-27 RX ORDER — DILTIAZEM HYDROCHLORIDE 120 MG/1
120 CAPSULE, COATED, EXTENDED RELEASE ORAL DAILY
Qty: 30 CAPSULE | Refills: 3 | Status: SHIPPED | OUTPATIENT
Start: 2023-06-28

## 2023-06-27 RX ORDER — SODIUM CHLORIDE 9 MG/ML
INJECTION, SOLUTION INTRAVENOUS PRN
Status: DISCONTINUED | OUTPATIENT
Start: 2023-06-27 | End: 2023-06-27

## 2023-06-27 RX ORDER — SODIUM CHLORIDE 0.9 % (FLUSH) 0.9 %
5-40 SYRINGE (ML) INJECTION PRN
Status: DISCONTINUED | OUTPATIENT
Start: 2023-06-27 | End: 2023-06-27

## 2023-06-27 RX ORDER — LOSARTAN POTASSIUM 25 MG/1
25 TABLET ORAL DAILY
Qty: 30 TABLET | Refills: 3 | Status: SHIPPED | OUTPATIENT
Start: 2023-06-28

## 2023-06-27 RX ORDER — BUMETANIDE 1 MG/1
1 TABLET ORAL DAILY
Qty: 15 TABLET | Refills: 0 | Status: SHIPPED | OUTPATIENT
Start: 2023-06-27

## 2023-06-27 RX ADMIN — BUMETANIDE 1 MG: 0.25 INJECTION INTRAMUSCULAR; INTRAVENOUS at 08:45

## 2023-06-27 RX ADMIN — DILTIAZEM HYDROCHLORIDE 120 MG: 120 CAPSULE, COATED, EXTENDED RELEASE ORAL at 08:45

## 2023-06-27 RX ADMIN — HEPARIN SODIUM 12 UNITS/KG/HR: 10000 INJECTION, SOLUTION INTRAVENOUS at 03:40

## 2023-06-27 RX ADMIN — METOPROLOL TARTRATE 50 MG: 50 TABLET, FILM COATED ORAL at 08:45

## 2023-06-27 RX ADMIN — LOSARTAN POTASSIUM 25 MG: 25 TABLET, FILM COATED ORAL at 08:45

## 2023-06-29 LAB
BACTERIA BLD AEROBE CULT: NORMAL
BACTERIA BLD AEROBE CULT: NORMAL

## 2023-07-01 LAB
EKG ATRIAL RATE: 61 BPM
EKG ATRIAL RATE: 66 BPM
EKG P AXIS: 57 DEGREES
EKG P AXIS: 63 DEGREES
EKG P-R INTERVAL: 194 MS
EKG P-R INTERVAL: 194 MS
EKG Q-T INTERVAL: 434 MS
EKG Q-T INTERVAL: 446 MS
EKG QRS DURATION: 100 MS
EKG QRS DURATION: 90 MS
EKG QTC CALCULATION (BAZETT): 448 MS
EKG QTC CALCULATION (BAZETT): 454 MS
EKG R AXIS: -69 DEGREES
EKG R AXIS: -78 DEGREES
EKG T AXIS: 100 DEGREES
EKG T AXIS: 98 DEGREES
EKG VENTRICULAR RATE: 61 BPM
EKG VENTRICULAR RATE: 66 BPM

## 2023-07-12 ENCOUNTER — HOSPITAL ENCOUNTER (OUTPATIENT)
Age: 88
Discharge: HOME | End: 2023-07-12
Payer: MEDICARE

## 2023-07-12 ENCOUNTER — OFFICE VISIT (OUTPATIENT)
Dept: CARDIOLOGY CLINIC | Age: 88
End: 2023-07-12
Payer: MEDICARE

## 2023-07-12 ENCOUNTER — TELEPHONE (OUTPATIENT)
Dept: CARDIOLOGY CLINIC | Age: 88
End: 2023-07-12

## 2023-07-12 VITALS
BODY MASS INDEX: 27.61 KG/M2 | HEART RATE: 82 BPM | HEIGHT: 63 IN | WEIGHT: 155.8 LBS | DIASTOLIC BLOOD PRESSURE: 80 MMHG | SYSTOLIC BLOOD PRESSURE: 118 MMHG

## 2023-07-12 DIAGNOSIS — I10 PRIMARY HYPERTENSION: ICD-10-CM

## 2023-07-12 DIAGNOSIS — I48.0 PAF (PAROXYSMAL ATRIAL FIBRILLATION) (HCC): ICD-10-CM

## 2023-07-12 DIAGNOSIS — I50.32 CHRONIC DIASTOLIC CONGESTIVE HEART FAILURE (HCC): Primary | ICD-10-CM

## 2023-07-12 DIAGNOSIS — I50.32 CHRONIC DIASTOLIC CONGESTIVE HEART FAILURE (HCC): ICD-10-CM

## 2023-07-12 LAB
ANION GAP SERPL CALC-SCNC: 7 MEQ/L (ref 8–16)
BUN SERPL-MCNC: 19 MG/DL (ref 7–22)
CALCIUM SERPL-MCNC: 10 MG/DL (ref 8.5–10.5)
CHLORIDE SERPL-SCNC: 97 MEQ/L (ref 98–111)
CO2 SERPL-SCNC: 29 MEQ/L (ref 23–33)
CREAT SERPL-MCNC: 0.9 MG/DL (ref 0.4–1.2)
GFR SERPL CREATININE-BSD FRML MDRD: > 60 ML/MIN/1.73M2
GLUCOSE SERPL-MCNC: 112 MG/DL (ref 70–108)
MAGNESIUM SERPL-MCNC: 2.4 MG/DL (ref 1.6–2.4)
POTASSIUM SERPL-SCNC: 3.7 MEQ/L (ref 3.5–5.2)
SODIUM SERPL-SCNC: 133 MEQ/L (ref 135–145)

## 2023-07-12 PROCEDURE — 1123F ACP DISCUSS/DSCN MKR DOCD: CPT | Performed by: INTERNAL MEDICINE

## 2023-07-12 PROCEDURE — 1111F DSCHRG MED/CURRENT MED MERGE: CPT | Performed by: INTERNAL MEDICINE

## 2023-07-12 PROCEDURE — 1036F TOBACCO NON-USER: CPT | Performed by: INTERNAL MEDICINE

## 2023-07-12 PROCEDURE — 80048 BASIC METABOLIC PNL TOTAL CA: CPT

## 2023-07-12 PROCEDURE — 83735 ASSAY OF MAGNESIUM: CPT

## 2023-07-12 PROCEDURE — 99215 OFFICE O/P EST HI 40 MIN: CPT | Performed by: INTERNAL MEDICINE

## 2023-07-12 PROCEDURE — G8427 DOCREV CUR MEDS BY ELIG CLIN: HCPCS | Performed by: INTERNAL MEDICINE

## 2023-07-12 PROCEDURE — 93000 ELECTROCARDIOGRAM COMPLETE: CPT | Performed by: INTERNAL MEDICINE

## 2023-07-12 PROCEDURE — G8419 CALC BMI OUT NRM PARAM NOF/U: HCPCS | Performed by: INTERNAL MEDICINE

## 2023-07-12 PROCEDURE — 36415 COLL VENOUS BLD VENIPUNCTURE: CPT

## 2023-07-12 PROCEDURE — 1090F PRES/ABSN URINE INCON ASSESS: CPT | Performed by: INTERNAL MEDICINE

## 2023-07-12 RX ORDER — METOPROLOL TARTRATE 50 MG/1
50 TABLET, FILM COATED ORAL 2 TIMES DAILY
Qty: 180 TABLET | Refills: 1 | Status: SHIPPED | OUTPATIENT
Start: 2023-07-12

## 2023-07-12 RX ORDER — BUMETANIDE 1 MG/1
1 TABLET ORAL DAILY
Qty: 90 TABLET | Refills: 1 | Status: CANCELLED | OUTPATIENT
Start: 2023-07-12

## 2023-07-12 RX ORDER — DILTIAZEM HYDROCHLORIDE 120 MG/1
120 CAPSULE, COATED, EXTENDED RELEASE ORAL DAILY
Qty: 90 CAPSULE | Refills: 1 | Status: SHIPPED | OUTPATIENT
Start: 2023-07-12

## 2023-07-12 RX ORDER — LOSARTAN POTASSIUM 25 MG/1
12.5 TABLET ORAL DAILY
Qty: 45 TABLET | Refills: 1 | Status: SHIPPED | OUTPATIENT
Start: 2023-07-12

## 2023-07-12 RX ORDER — POTASSIUM CHLORIDE 750 MG/1
10 TABLET, EXTENDED RELEASE ORAL DAILY
Qty: 90 TABLET | Refills: 1 | Status: SHIPPED | OUTPATIENT
Start: 2023-07-12

## 2023-07-12 RX ORDER — BUMETANIDE 1 MG/1
1 TABLET ORAL DAILY
Qty: 90 TABLET | Refills: 1 | Status: SHIPPED | OUTPATIENT
Start: 2023-07-12

## 2023-07-12 NOTE — PROGRESS NOTES
daily 180 tablet 1    potassium chloride (KLOR-CON M) 10 MEQ extended release tablet Take 1 tablet by mouth daily 90 tablet 1    bumetanide (BUMEX) 1 MG tablet Take 1 tablet by mouth daily 90 tablet 1    apixaban (ELIQUIS) 5 MG TABS tablet Take 1 tablet by mouth 2 times daily 180 tablet 1    Multiple Vitamins-Minerals (PRESERVISION AREDS 2 PO) Take 1 tablet by mouth in the morning and at bedtime      Omega-3 Fatty Acids (FISH OIL) 1000 MG capsule Take by mouth daily      brimonidine-timolol (COMBIGAN) 0.2-0.5 % ophthalmic solution Place 1 drop into both eyes at bedtime      triamcinolone (KENALOG) 0.1 % cream Apply topically 2 times daily Apply topically 2 times daily. multivitamin-iron-minerals-folic acid (CENTRUM) chewable tablet Take by mouth daily (Patient not taking: Reported on 7/12/2023)       No current facility-administered medications for this visit. Review of Systems -     General ROS: negative  Psychological ROS: negative  Hematological and Lymphatic ROS: No history of blood clots or bleeding disorder. Respiratory ROS: no cough,  or wheezing, the rest see HPI  Cardiovascular ROS: See HPI  Gastrointestinal ROS: negative  Genito-Urinary ROS: no dysuria, trouble voiding, or hematuria  Musculoskeletal ROS: negative  Neurological ROS: no TIA or stroke symptoms  Dermatological ROS: negative      Blood pressure 118/80, pulse 82, height 5' 3\" (1.6 m), weight 155 lb 12.8 oz (70.7 kg).         Physical Examination:    General appearance - alert, well appearing, and in no distress  HEENT- Pink conjunctiva  , Non-icteri sclera,PERRLA  Mental status - alert, oriented to person, place, and time  Neck - supple, no significant adenopathy, no JVD, or carotid bruits  Chest - clear to auscultation, no wheezes, rales or rhonchi, symmetric air entry  Heart - normal rate, regular rhythm, normal S1, S2, no murmurs, rubs, clicks or gallops  Abdomen - soft, nontender, nondistended, no masses or organomegaly  TUCKER- no

## 2023-07-12 NOTE — TELEPHONE ENCOUNTER
Per Dr. Cardona Glendo verbal order add potassium 10 meq daily. Decrease Losartan to 12.5 mg daily. BMP and Mag before next appointment on 10-11-23. Scripts already at pharmacy. Patient notified and voiced understanding.

## 2023-08-14 ENCOUNTER — OFFICE VISIT (OUTPATIENT)
Dept: CARDIOLOGY CLINIC | Age: 88
End: 2023-08-14

## 2023-08-14 VITALS
SYSTOLIC BLOOD PRESSURE: 106 MMHG | WEIGHT: 156.4 LBS | HEART RATE: 78 BPM | BODY MASS INDEX: 27.71 KG/M2 | DIASTOLIC BLOOD PRESSURE: 70 MMHG | OXYGEN SATURATION: 96 %

## 2023-08-14 DIAGNOSIS — Z91.89 AT RISK FOR FLUID VOLUME OVERLOAD: ICD-10-CM

## 2023-08-14 DIAGNOSIS — I50.30 HEART FAILURE WITH PRESERVED EJECTION FRACTION, NYHA CLASS II (HCC): Primary | ICD-10-CM

## 2023-08-14 DIAGNOSIS — I48.0 PAF (PAROXYSMAL ATRIAL FIBRILLATION) (HCC): ICD-10-CM

## 2023-08-14 ASSESSMENT — ENCOUNTER SYMPTOMS
SHORTNESS OF BREATH: 0
ABDOMINAL PAIN: 0
COUGH: 0
ABDOMINAL DISTENTION: 0
WHEEZING: 0

## 2023-08-14 NOTE — PROGRESS NOTES
Heart Failure Clinic       Visit Date: 8/14/2023  Cardiologist:  Dr. Caro Swan  Primary Care Physician: Dr. Kishan Matthews is a 81 y.o. female who presents today for:  Chief Complaint   Patient presents with    Congestive Heart Failure     New Patient        HPI:     TYPE HF: HFpEF 55-60%    Cause: multifactorial   Device:   HX: Afib w/ RVR, HTN  Dry Wt:  156 on 8/14/23      Rao Merrill is a 81 y.o. female who presents to the office for a new patient visit in the heart failure clinic. Concerns today: here today as new pt referred from hospitalization for CHF in June. She presented c/o of progressive SOB. She also noted abdominal bloating and lower leg swelling. She was found to be in afib with HR of 105. She was converted on cardizem dtt. She was discharged on eliquis and PO cardizem. She notes improved SOB, bloating and lower leg swelling. Urinating well on her Bumex. Patient follows:      Hospitalization:      Admit date: 6/24/2023  Discharge date: 6/27/23     Atrial Fibrillation, new onset: Converted to normal sinus rhythm. Patient was previously rate controlled initially on Cardizem gtt. and was converted to Cardizem 120 p.o. On presentation to ED patient was found to be in  A-fib RVR requiring Cardizem gtt. Weaned off to p.o. on 6/25/2023. T4, free WNL  -[id number]: 4, HAS-BLED: 2.    -EKG on presentation, 6/24/2023, A-fib RVR. -Repeat EKG 6/27/2020 3x2 showed NSR. At this time cardiology does not wish to proceed with JESSICA cardioversion.   -Cardiology following  -Potassium greater than 4, mag greater than 2.  -Continue Cardizem 120, metoprolol 50 twice daily.  -Heparin drip DC'd at discharge, transition to Eliquis  -Discussed with patient Eliquis 10 mg twice daily x7 days, followed by Eliquis 5 mg twice daily.   -JESSICA shows EF 55 to 60% with no wall motion abnormalities  -Patient to follow-up with cardiology in 2 weeks as well as CHF clinic approximately 2

## 2023-08-14 NOTE — PATIENT INSTRUCTIONS
You may receive a survey regarding the care you received during your visit. Your input is valuable to us. We encourage you to complete and return your survey. We hope you will choose us in the future for your healthcare needs. Your nurses today were Verónica and [employer].   Office hours:   Mon-Thurs 8-4:30  Friday 8-12  Phone: [phone]    Continue:  Continue current medications  Daily weights and record  Fluid restriction of 2 Liters per day  Limit sodium in diet to around 8576-1042 mg/day  Monitor BP  Activity as tolerated     Call the [address] for any of the following symptoms:   Weight gain of 2-3 pounds in 1 day or 5 pounds in 1 week  Increased shortness of breath  Shortness of breath while laying down  Cough  Chest pain  Swelling in feet, ankles or legs  Bloating in abdomen  Fatigue

## 2023-08-15 ENCOUNTER — TELEPHONE (OUTPATIENT)
Dept: CARDIOLOGY CLINIC | Age: 88
End: 2023-08-15

## 2023-08-15 NOTE — TELEPHONE ENCOUNTER
Discussed EKG w/ Dr. Zuri Gilmore - will see how blood pressure and fatigue/lack on energy changes not on Losartan. If BP ok and still fatigues/lack of energy continues he is ok with a JESSICA-cardioversion. Call in two weeks for BP log and symptoms.  Thank you

## 2023-08-29 NOTE — TELEPHONE ENCOUNTER
Spoke with patient she only has been checking the past couple of days and only has systolic and hr  Denies lightheadedness/dizzyness    8/23 127/ 87  8/24 104/ 85  8/25 99/ 80  8/28 95/ 90

## 2023-10-02 RX ORDER — DILTIAZEM HYDROCHLORIDE 120 MG/1
CAPSULE, COATED, EXTENDED RELEASE ORAL DAILY
Qty: 90 CAPSULE | Refills: 1 | OUTPATIENT
Start: 2023-10-02

## 2023-10-11 ENCOUNTER — HOSPITAL ENCOUNTER (OUTPATIENT)
Age: 88
Discharge: HOME | End: 2023-10-11
Payer: MEDICARE

## 2023-10-11 ENCOUNTER — OFFICE VISIT (OUTPATIENT)
Dept: CARDIOLOGY CLINIC | Age: 88
End: 2023-10-11
Payer: MEDICARE

## 2023-10-11 VITALS
HEART RATE: 95 BPM | WEIGHT: 156.6 LBS | DIASTOLIC BLOOD PRESSURE: 84 MMHG | HEIGHT: 63 IN | SYSTOLIC BLOOD PRESSURE: 122 MMHG | BODY MASS INDEX: 27.75 KG/M2

## 2023-10-11 DIAGNOSIS — I10 PRIMARY HYPERTENSION: ICD-10-CM

## 2023-10-11 DIAGNOSIS — I48.0 PAF (PAROXYSMAL ATRIAL FIBRILLATION) (HCC): ICD-10-CM

## 2023-10-11 DIAGNOSIS — I50.32 CHRONIC DIASTOLIC CONGESTIVE HEART FAILURE (HCC): Primary | ICD-10-CM

## 2023-10-11 DIAGNOSIS — I50.32 CHRONIC DIASTOLIC CONGESTIVE HEART FAILURE (HCC): ICD-10-CM

## 2023-10-11 LAB
ANION GAP SERPL CALC-SCNC: 11 MEQ/L (ref 8–16)
BUN SERPL-MCNC: 24 MG/DL (ref 7–22)
CALCIUM SERPL-MCNC: 9.9 MG/DL (ref 8.5–10.5)
CHLORIDE SERPL-SCNC: 102 MEQ/L (ref 98–111)
CO2 SERPL-SCNC: 26 MEQ/L (ref 23–33)
CREAT SERPL-MCNC: 1 MG/DL (ref 0.4–1.2)
GFR SERPL CREATININE-BSD FRML MDRD: 54 ML/MIN/1.73M2
GLUCOSE SERPL-MCNC: 110 MG/DL (ref 70–108)
MAGNESIUM SERPL-MCNC: 2.1 MG/DL (ref 1.6–2.4)
POTASSIUM SERPL-SCNC: 4.3 MEQ/L (ref 3.5–5.2)
SODIUM SERPL-SCNC: 139 MEQ/L (ref 135–145)

## 2023-10-11 PROCEDURE — 36415 COLL VENOUS BLD VENIPUNCTURE: CPT

## 2023-10-11 PROCEDURE — G8427 DOCREV CUR MEDS BY ELIG CLIN: HCPCS | Performed by: INTERNAL MEDICINE

## 2023-10-11 PROCEDURE — G8484 FLU IMMUNIZE NO ADMIN: HCPCS | Performed by: INTERNAL MEDICINE

## 2023-10-11 PROCEDURE — 83735 ASSAY OF MAGNESIUM: CPT

## 2023-10-11 PROCEDURE — 1036F TOBACCO NON-USER: CPT | Performed by: INTERNAL MEDICINE

## 2023-10-11 PROCEDURE — 80048 BASIC METABOLIC PNL TOTAL CA: CPT

## 2023-10-11 PROCEDURE — 99214 OFFICE O/P EST MOD 30 MIN: CPT | Performed by: INTERNAL MEDICINE

## 2023-10-11 PROCEDURE — 1090F PRES/ABSN URINE INCON ASSESS: CPT | Performed by: INTERNAL MEDICINE

## 2023-10-11 PROCEDURE — 1123F ACP DISCUSS/DSCN MKR DOCD: CPT | Performed by: INTERNAL MEDICINE

## 2023-10-11 PROCEDURE — G8419 CALC BMI OUT NRM PARAM NOF/U: HCPCS | Performed by: INTERNAL MEDICINE

## 2023-10-11 NOTE — PROGRESS NOTES
Chief Complaint   Patient presents with    3 Month Follow-Up    Congestive Heart Failure    Shortness of Breath    Palpitations    Edema   PROVIDER:     Noris Rondon. ALYSSA Figueredo Prom:  06/25/2023     REASON FOR CONSULTATION:  New-onset atrial fibrillation with rapid  ventricular response and CHF     The patient presents for a post-hospital follow-up. She was discharged on 06/27/2023. Treated for ZNew CHF and new atr fib converted to NSR on its own  and sent homer on new meds  Apixaban, bumex and cardizem and cefedinir      The patient presents for a 3-month follow-up. No wt gain    Postprandial fatigue  after heavy meal and advised small frequent diet    Denied cp, dizziness, or edema     Sob on exertion     Occasional palpitation    No more leg edema      History reviewed. No pertinent surgical history. Allergies   Allergen Reactions    Amoxicillin-Pot Clavulanate Diarrhea        History reviewed. No pertinent family history. Social History     Socioeconomic History    Marital status:       Spouse name: Not on file    Number of children: Not on file    Years of education: Not on file    Highest education level: Not on file   Occupational History    Not on file   Tobacco Use    Smoking status: Former     Types: Cigarettes    Smokeless tobacco: Never   Substance and Sexual Activity    Alcohol use: Not on file    Drug use: Not on file    Sexual activity: Not on file   Other Topics Concern    Not on file   Social History Narrative    Not on file     Social Determinants of Health     Financial Resource Strain: Not on file   Food Insecurity: Not on file   Transportation Needs: Not on file   Physical Activity: Not on file   Stress: Not on file   Social Connections: Not on file   Intimate Partner Violence: Not on file   Housing Stability: Not on file       Current Outpatient Medications   Medication Sig Dispense Refill    dilTIAZem (CARDIZEM CD) 120 MG extended release capsule Take 1 capsule by

## 2023-11-20 ENCOUNTER — OFFICE VISIT (OUTPATIENT)
Dept: CARDIOLOGY CLINIC | Age: 88
End: 2023-11-20
Payer: MEDICARE

## 2023-11-20 VITALS
DIASTOLIC BLOOD PRESSURE: 60 MMHG | HEIGHT: 64 IN | BODY MASS INDEX: 26.12 KG/M2 | OXYGEN SATURATION: 99 % | HEART RATE: 70 BPM | WEIGHT: 153 LBS | SYSTOLIC BLOOD PRESSURE: 98 MMHG

## 2023-11-20 DIAGNOSIS — I48.0 PAF (PAROXYSMAL ATRIAL FIBRILLATION) (HCC): Primary | ICD-10-CM

## 2023-11-20 DIAGNOSIS — R06.02 SOB (SHORTNESS OF BREATH) ON EXERTION: ICD-10-CM

## 2023-11-20 DIAGNOSIS — Z91.89 AT RISK FOR FLUID VOLUME OVERLOAD: ICD-10-CM

## 2023-11-20 DIAGNOSIS — I50.30 HEART FAILURE WITH PRESERVED EJECTION FRACTION, NYHA CLASS II (HCC): ICD-10-CM

## 2023-11-20 PROCEDURE — 1036F TOBACCO NON-USER: CPT | Performed by: NURSE PRACTITIONER

## 2023-11-20 PROCEDURE — 99214 OFFICE O/P EST MOD 30 MIN: CPT | Performed by: NURSE PRACTITIONER

## 2023-11-20 PROCEDURE — G8482 FLU IMMUNIZE ORDER/ADMIN: HCPCS | Performed by: NURSE PRACTITIONER

## 2023-11-20 PROCEDURE — 1123F ACP DISCUSS/DSCN MKR DOCD: CPT | Performed by: NURSE PRACTITIONER

## 2023-11-20 PROCEDURE — G8427 DOCREV CUR MEDS BY ELIG CLIN: HCPCS | Performed by: NURSE PRACTITIONER

## 2023-11-20 PROCEDURE — G8419 CALC BMI OUT NRM PARAM NOF/U: HCPCS | Performed by: NURSE PRACTITIONER

## 2023-11-20 PROCEDURE — 1090F PRES/ABSN URINE INCON ASSESS: CPT | Performed by: NURSE PRACTITIONER

## 2023-11-20 RX ORDER — METOPROLOL TARTRATE 50 MG/1
50 TABLET, FILM COATED ORAL 2 TIMES DAILY
Qty: 180 TABLET | Refills: 3 | Status: SHIPPED | OUTPATIENT
Start: 2023-11-20

## 2023-11-20 RX ORDER — POTASSIUM CHLORIDE 750 MG/1
10 TABLET, EXTENDED RELEASE ORAL DAILY
Qty: 90 TABLET | Refills: 3 | Status: SHIPPED | OUTPATIENT
Start: 2023-11-20

## 2023-11-20 RX ORDER — BUMETANIDE 1 MG/1
1 TABLET ORAL DAILY
Qty: 90 TABLET | Refills: 3 | Status: SHIPPED | OUTPATIENT
Start: 2023-11-20

## 2023-11-20 RX ORDER — DILTIAZEM HYDROCHLORIDE 120 MG/1
120 CAPSULE, COATED, EXTENDED RELEASE ORAL DAILY
Qty: 90 CAPSULE | Refills: 3 | Status: SHIPPED | OUTPATIENT
Start: 2023-11-20

## 2023-11-20 ASSESSMENT — ENCOUNTER SYMPTOMS
SHORTNESS OF BREATH: 1
COUGH: 0
WHEEZING: 0
ABDOMINAL PAIN: 0
ABDOMINAL DISTENTION: 0

## 2023-11-20 NOTE — PATIENT INSTRUCTIONS
You may receive a survey regarding the care you received during your visit. Your input is valuable to us. We encourage you to complete and return your survey. We hope you will choose us in the future for your healthcare needs. Your nurses today were Verónica and [employer].   Office hours:   Mon-Thurs 8-4:30  Friday 8-12  Phone: [phone]    Continue:  Continue current medications  Daily weights and record  Fluid restriction of 2 Liters per day  Limit sodium in diet to around 8444-3902 mg/day  Monitor BP  Activity as tolerated     Call the [address] for any of the following symptoms:   Weight gain of 2-3 pounds in 1 day or 5 pounds in 1 week  Increased shortness of breath  Shortness of breath while laying down  Cough  Chest pain  Swelling in feet, ankles or legs  Bloating in abdomen  Fatigue

## 2023-11-20 NOTE — PROGRESS NOTES
Heart Failure Clinic       Visit Date: 11/20/2023  Cardiologist:  Dr. Daniel Kamara  Primary Care Physician: Dr. Naida Olson, APRN - CNP    Hannah Rock is a 81 y.o. female who presents today for:  Chief Complaint   Patient presents with    Congestive Heart Failure       HPI:     TYPE HF: HFpEF 55-60%    Cause: multifactorial   Device:   HX: Afib w/ RVR, HTN  Dry Wt:  156 on 8/14/23, 153 on 11/20/23      Hannah Rock is a 81 y.o. female who presents to the office for a f/u patient visit in the heart failure clinic. Concerns today: here today for her 3 month f/u. She notes worsening SOB with minimal activity. She stats that her leg swelling is the same and continues. She continues to not take her bumex daily - misses it about 1-2 times a week. Denies orthopnea, nocturnal dyspnea, or chest pain. She urinates well on her Bumex. Continues to eat freezer meals and is exceeding limit. Visit on 8/14/23: here today as new pt referred from hospitalization for CHF in June. She presented c/o of progressive SOB. She also noted abdominal bloating and lower leg swelling. She was found to be in afib with HR of 105. She was converted on cardizem dtt. She was discharged on eliquis and PO cardizem. She notes improved SOB, bloating and lower leg swelling. Urinating well on her Bumex. Patient follows:      Hospitalization:      Admit date: 6/24/2023  Discharge date: 6/27/23     Atrial Fibrillation, new onset: Converted to normal sinus rhythm. Patient was previously rate controlled initially on Cardizem gtt. and was converted to Cardizem 120 p.o. On presentation to ED patient was found to be in  A-fib RVR requiring Cardizem gtt. Weaned off to p.o. on 6/25/2023. T4, free WNL  -[id number]: 4, HAS-BLED: 2.    -EKG on presentation, 6/24/2023, A-fib RVR. -Repeat EKG 6/27/2020 3x2 showed NSR.   At this time cardiology does not wish to proceed with JESSICA cardioversion.   -Cardiology following  -Potassium greater

## 2023-11-29 ENCOUNTER — HOSPITAL ENCOUNTER (OUTPATIENT)
Dept: NUCLEAR MEDICINE | Age: 88
Discharge: HOME | End: 2023-11-29
Payer: MEDICARE

## 2023-11-29 ENCOUNTER — HOSPITAL ENCOUNTER (OUTPATIENT)
Age: 88
Discharge: HOME | End: 2023-12-01
Payer: MEDICARE

## 2023-11-29 VITALS — HEIGHT: 63 IN | WEIGHT: 150 LBS | BODY MASS INDEX: 26.58 KG/M2

## 2023-11-29 DIAGNOSIS — R06.02 SOB (SHORTNESS OF BREATH) ON EXERTION: ICD-10-CM

## 2023-11-29 LAB
ECHO BSA: 1.74 M2
NUC STRESS EJECTION FRACTION: 68 %
STRESS BASELINE DIAS BP: 76 MMHG
STRESS BASELINE HR: 75 BPM
STRESS BASELINE ST DEPRESSION: 0 MM
STRESS BASELINE SYS BP: 120 MMHG
STRESS PEAK DIAS BP: 76 MMHG
STRESS PEAK SYS BP: 120 MMHG
STRESS PERCENT HR ACHIEVED: 77 %
STRESS POST PEAK HR: 102 BPM
STRESS RATE PRESSURE PRODUCT: NORMAL BPM*MMHG
STRESS ST DEPRESSION: 0 MM
STRESS STAGE 1 BP: NORMAL MMHG
STRESS STAGE 1 DURATION: 1 MIN:SEC
STRESS STAGE 1 HR: 75 BPM
STRESS STAGE 2 BP: NORMAL MMHG
STRESS STAGE 2 DURATION: 1 MIN:SEC
STRESS STAGE 2 HR: 102 BPM
STRESS STAGE 3 BP: NORMAL MMHG
STRESS STAGE 3 DURATION: 1 MIN:SEC
STRESS STAGE 3 HR: 98 BPM
STRESS STAGE RECOVERY 1 BP: NORMAL MMHG
STRESS STAGE RECOVERY 1 DURATION: 1 MIN:SEC
STRESS STAGE RECOVERY 1 HR: 95 BPM
STRESS STAGE RECOVERY 2 BP: NORMAL MMHG
STRESS STAGE RECOVERY 2 DURATION: 1 MIN:SEC
STRESS STAGE RECOVERY 2 HR: 82 BPM
STRESS STAGE RECOVERY 3 BP: NORMAL MMHG
STRESS STAGE RECOVERY 3 DURATION: 1 MIN:SEC
STRESS STAGE RECOVERY 3 HR: 85 BPM
STRESS STAGE RECOVERY 4 BP: NORMAL MMHG
STRESS STAGE RECOVERY 4 DURATION: 2 MIN:SEC
STRESS STAGE RECOVERY 4 HR: 93 BPM
STRESS TARGET HR: 133 BPM
TID: 1.27

## 2023-11-29 PROCEDURE — 78452 HT MUSCLE IMAGE SPECT MULT: CPT | Performed by: INTERNAL MEDICINE

## 2023-11-29 PROCEDURE — 3430000000 HC RX DIAGNOSTIC RADIOPHARMACEUTICAL: Performed by: INTERNAL MEDICINE

## 2023-11-29 PROCEDURE — 93016 CV STRESS TEST SUPVJ ONLY: CPT | Performed by: INTERNAL MEDICINE

## 2023-11-29 PROCEDURE — 6360000002 HC RX W HCPCS: Performed by: INTERNAL MEDICINE

## 2023-11-29 PROCEDURE — A9500 TC99M SESTAMIBI: HCPCS | Performed by: INTERNAL MEDICINE

## 2023-11-29 PROCEDURE — 93018 CV STRESS TEST I&R ONLY: CPT | Performed by: INTERNAL MEDICINE

## 2023-11-29 PROCEDURE — 93017 CV STRESS TEST TRACING ONLY: CPT

## 2023-11-29 PROCEDURE — 78452 HT MUSCLE IMAGE SPECT MULT: CPT

## 2023-11-29 RX ORDER — TETRAKIS(2-METHOXYISOBUTYLISOCYANIDE)COPPER(I) TETRAFLUOROBORATE 1 MG/ML
8.9 INJECTION, POWDER, LYOPHILIZED, FOR SOLUTION INTRAVENOUS
Status: COMPLETED | OUTPATIENT
Start: 2023-11-29 | End: 2023-11-29

## 2023-11-29 RX ORDER — TETRAKIS(2-METHOXYISOBUTYLISOCYANIDE)COPPER(I) TETRAFLUOROBORATE 1 MG/ML
31.4 INJECTION, POWDER, LYOPHILIZED, FOR SOLUTION INTRAVENOUS
Status: COMPLETED | OUTPATIENT
Start: 2023-11-29 | End: 2023-11-29

## 2023-11-29 RX ORDER — REGADENOSON 0.08 MG/ML
0.4 INJECTION, SOLUTION INTRAVENOUS
Status: COMPLETED | OUTPATIENT
Start: 2023-11-29 | End: 2023-11-29

## 2023-11-29 RX ORDER — AMINOPHYLLINE 25 MG/ML
75 INJECTION, SOLUTION INTRAVENOUS ONCE
Status: COMPLETED | OUTPATIENT
Start: 2023-11-29 | End: 2023-11-29

## 2023-11-29 RX ADMIN — Medication 31.4 MILLICURIE: at 11:58

## 2023-11-29 RX ADMIN — AMINOPHYLLINE 75 MG: 25 INJECTION, SOLUTION INTRAVENOUS at 12:02

## 2023-11-29 RX ADMIN — REGADENOSON 0.4 MG: 0.08 INJECTION, SOLUTION INTRAVENOUS at 11:57

## 2023-11-29 RX ADMIN — Medication 8.9 MILLICURIE: at 11:04

## 2023-11-30 ENCOUNTER — TELEPHONE (OUTPATIENT)
Dept: CARDIOLOGY CLINIC | Age: 88
End: 2023-11-30

## 2023-11-30 DIAGNOSIS — I48.0 PAF (PAROXYSMAL ATRIAL FIBRILLATION) (HCC): Primary | ICD-10-CM

## 2023-11-30 NOTE — TELEPHONE ENCOUNTER
----- Message from PATRICIA Thomas CNP sent at 11/30/2023 10:11 AM EST -----  Stress is negative. Scheduling a JESSICA cardioversion - please call pt to let her know.

## 2023-12-13 ENCOUNTER — HOSPITAL ENCOUNTER (OUTPATIENT)
Age: 88
Discharge: HOME | End: 2023-12-15
Payer: MEDICARE

## 2023-12-13 ENCOUNTER — ANESTHESIA (OUTPATIENT)
Age: 88
End: 2023-12-13
Payer: MEDICARE

## 2023-12-13 ENCOUNTER — ANESTHESIA EVENT (OUTPATIENT)
Age: 88
End: 2023-12-13
Payer: MEDICARE

## 2023-12-13 VITALS
DIASTOLIC BLOOD PRESSURE: 76 MMHG | OXYGEN SATURATION: 97 % | HEIGHT: 64 IN | BODY MASS INDEX: 25.61 KG/M2 | RESPIRATION RATE: 28 BRPM | HEART RATE: 89 BPM | SYSTOLIC BLOOD PRESSURE: 116 MMHG | WEIGHT: 150 LBS | TEMPERATURE: 98 F

## 2023-12-13 DIAGNOSIS — I48.0 PAF (PAROXYSMAL ATRIAL FIBRILLATION) (HCC): ICD-10-CM

## 2023-12-13 DIAGNOSIS — H35.30 MACULAR DEGENERATION: Primary | ICD-10-CM

## 2023-12-13 DIAGNOSIS — H40.9 GLAUCOMA: ICD-10-CM

## 2023-12-13 LAB
ALBUMIN SERPL BCG-MCNC: 3.9 G/DL (ref 3.5–5.1)
ALP SERPL-CCNC: 66 U/L (ref 38–126)
ALT SERPL W/O P-5'-P-CCNC: 14 U/L (ref 11–66)
ANION GAP SERPL CALC-SCNC: 10 MEQ/L (ref 8–16)
APTT PPP: 34.2 SECONDS (ref 22–38)
AST SERPL-CCNC: 22 U/L (ref 5–40)
BILIRUB SERPL-MCNC: 0.4 MG/DL (ref 0.3–1.2)
BUN SERPL-MCNC: 23 MG/DL (ref 7–22)
CALCIUM SERPL-MCNC: 9.3 MG/DL (ref 8.5–10.5)
CHLORIDE SERPL-SCNC: 101 MEQ/L (ref 98–111)
CHOLEST SERPL-MCNC: 145 MG/DL (ref 100–199)
CO2 SERPL-SCNC: 24 MEQ/L (ref 23–33)
CREAT SERPL-MCNC: 1.1 MG/DL (ref 0.4–1.2)
DEPRECATED RDW RBC AUTO: 49.4 FL (ref 35–45)
EKG Q-T INTERVAL: 364 MS
EKG QRS DURATION: 80 MS
EKG QTC CALCULATION (BAZETT): 427 MS
EKG R AXIS: 123 DEGREES
EKG T AXIS: 76 DEGREES
EKG VENTRICULAR RATE: 83 BPM
ERYTHROCYTE [DISTWIDTH] IN BLOOD BY AUTOMATED COUNT: 14.9 % (ref 11.5–14.5)
FERRITIN SERPL IA-MCNC: 26 NG/ML (ref 10–291)
GFR SERPL CREATININE-BSD FRML MDRD: 48 ML/MIN/1.73M2
GLUCOSE SERPL-MCNC: 107 MG/DL (ref 70–108)
HCT VFR BLD AUTO: 29 % (ref 37–47)
HDLC SERPL-MCNC: 35 MG/DL
HGB BLD-MCNC: 8.9 GM/DL (ref 12–16)
INR PPP: 1.71 (ref 0.85–1.13)
IRON SATN MFR SERPL: 7 % (ref 20–50)
IRON SERPL-MCNC: 26 UG/DL (ref 50–170)
LDLC SERPL CALC-MCNC: 88 MG/DL
MCH RBC QN AUTO: 27.6 PG (ref 26–33)
MCHC RBC AUTO-ENTMCNC: 30.7 GM/DL (ref 32.2–35.5)
MCV RBC AUTO: 89.8 FL (ref 81–99)
PLATELET # BLD AUTO: 297 THOU/MM3 (ref 130–400)
PMV BLD AUTO: 9.4 FL (ref 9.4–12.4)
POTASSIUM SERPL-SCNC: 4.6 MEQ/L (ref 3.5–5.2)
PROT SERPL-MCNC: 5.9 G/DL (ref 6.1–8)
RBC # BLD AUTO: 3.23 MILL/MM3 (ref 4.2–5.4)
SODIUM SERPL-SCNC: 135 MEQ/L (ref 135–145)
TIBC SERPL-MCNC: 359 UG/DL (ref 171–450)
TRIGL SERPL-MCNC: 108 MG/DL (ref 0–199)
WBC # BLD AUTO: 5.1 THOU/MM3 (ref 4.8–10.8)

## 2023-12-13 PROCEDURE — 80061 LIPID PANEL: CPT

## 2023-12-13 PROCEDURE — 83550 IRON BINDING TEST: CPT

## 2023-12-13 PROCEDURE — 83540 ASSAY OF IRON: CPT

## 2023-12-13 PROCEDURE — 93005 ELECTROCARDIOGRAM TRACING: CPT | Performed by: INTERNAL MEDICINE

## 2023-12-13 PROCEDURE — 80053 COMPREHEN METABOLIC PANEL: CPT

## 2023-12-13 PROCEDURE — 85027 COMPLETE CBC AUTOMATED: CPT

## 2023-12-13 PROCEDURE — 93010 ELECTROCARDIOGRAM REPORT: CPT | Performed by: INTERNAL MEDICINE

## 2023-12-13 PROCEDURE — 85610 PROTHROMBIN TIME: CPT

## 2023-12-13 PROCEDURE — 2580000003 HC RX 258: Performed by: INTERNAL MEDICINE

## 2023-12-13 PROCEDURE — 85730 THROMBOPLASTIN TIME PARTIAL: CPT

## 2023-12-13 PROCEDURE — 82728 ASSAY OF FERRITIN: CPT

## 2023-12-13 PROCEDURE — 36415 COLL VENOUS BLD VENIPUNCTURE: CPT

## 2023-12-13 RX ORDER — SODIUM CHLORIDE 0.9 % (FLUSH) 0.9 %
5-40 SYRINGE (ML) INJECTION PRN
Status: DISCONTINUED | OUTPATIENT
Start: 2023-12-13 | End: 2023-12-16 | Stop reason: HOSPADM

## 2023-12-13 RX ORDER — SODIUM CHLORIDE 9 MG/ML
INJECTION, SOLUTION INTRAVENOUS PRN
Status: DISCONTINUED | OUTPATIENT
Start: 2023-12-13 | End: 2023-12-16 | Stop reason: HOSPADM

## 2023-12-13 RX ADMIN — SODIUM CHLORIDE: 9 INJECTION, SOLUTION INTRAVENOUS at 12:58

## 2023-12-13 NOTE — H&P
[facility]   History & Physical    Pt Name: Talon Gilmore  MRN: [id number]  YOB: 1935  Provider Performing Procedure: Ashok Caballero MD  Primary Care Physician: PATRICIA Mansfield - CNP    Pat scheduled for JESSICA-CV from CHF clinic due to worsening of sob recently despite being well diuresed    Pre-procedure work up showed moderate  anemia and  hence procedure cancelled because [address] should be stopped    D/w the pat at length and she is unhappy that it has to be cancelled   Family by bedside and understanding the scenario      Plan     Iron study   GI eval asap  FOBT  Stop apixaban  Family and pat informed the risk of CVA but need to chose lesser of two evil  F/u pcp in 1 week    Addendum  Iron study done and  showed very low Fe level and sat  We will call the pcp to initiate the treatment  it    Ashok Caballero MD  Electronically signed 12/13/2023 at 1:53 PM

## 2023-12-13 NOTE — FLOWSHEET NOTE
12/13/23 9033   AVS Reviewed   AVS & discharge instructions reviewed with patient and/or representative? Yes   Reviewed instructions with Patient; Other (name and relationship in comment)  (2 sons and daughter in law)   Level of Understanding Questions answered; Teach back completed;Verbalized understanding

## 2023-12-13 NOTE — FLOWSHEET NOTE
12/13/23 1410   Fall Risk Interventions   Hourly Visual Checks Awake; In bed     Dr. Muller Gather in to talk to patient and family about Hemoglobin of 8.9 and the need to investigate the reason for the drop in her hemoglobin, and that the procedure would be canceled. Patient was upset that the procedure was canceled. Sons and daughter in law understood the reasoning. Patient seemed over whelmed about everything. Appointment then made with Dr. Gurwinder Warren for tomorrow at 2pm. Lab results faxed to patient's PCP in [phone] so he could review them as well.

## 2024-01-19 RX ORDER — SODIUM CHLORIDE 9 MG/ML
25 INJECTION, SOLUTION INTRAVENOUS PRN
Status: DISCONTINUED | OUTPATIENT
Start: 2024-01-19 | End: 2024-01-22 | Stop reason: HOSPADM

## 2024-01-19 RX ORDER — SODIUM CHLORIDE 0.9 % (FLUSH) 0.9 %
5-40 SYRINGE (ML) INJECTION EVERY 12 HOURS SCHEDULED
Status: DISCONTINUED | OUTPATIENT
Start: 2024-01-19 | End: 2024-01-22 | Stop reason: HOSPADM

## 2024-01-19 RX ORDER — SODIUM CHLORIDE 9 MG/ML
INJECTION, SOLUTION INTRAVENOUS CONTINUOUS
Status: DISCONTINUED | OUTPATIENT
Start: 2024-01-19 | End: 2024-01-22 | Stop reason: HOSPADM

## 2024-01-19 RX ORDER — SODIUM CHLORIDE 0.9 % (FLUSH) 0.9 %
5-40 SYRINGE (ML) INJECTION PRN
Status: DISCONTINUED | OUTPATIENT
Start: 2024-01-19 | End: 2024-01-22 | Stop reason: HOSPADM

## 2024-01-22 ENCOUNTER — HOSPITAL ENCOUNTER (OUTPATIENT)
Age: 89
Setting detail: OUTPATIENT SURGERY
Discharge: HOME | End: 2024-01-22
Attending: INTERNAL MEDICINE | Admitting: INTERNAL MEDICINE
Payer: MEDICARE

## 2024-01-22 ENCOUNTER — ANESTHESIA (OUTPATIENT)
Dept: ENDOSCOPY | Age: 89
End: 2024-01-22
Payer: MEDICARE

## 2024-01-22 ENCOUNTER — ANESTHESIA EVENT (OUTPATIENT)
Dept: ENDOSCOPY | Age: 89
End: 2024-01-22
Payer: MEDICARE

## 2024-01-22 VITALS
DIASTOLIC BLOOD PRESSURE: 87 MMHG | RESPIRATION RATE: 17 BRPM | TEMPERATURE: 97.1 F | WEIGHT: 150.6 LBS | OXYGEN SATURATION: 95 % | BODY MASS INDEX: 25.71 KG/M2 | SYSTOLIC BLOOD PRESSURE: 128 MMHG | HEIGHT: 64 IN | HEART RATE: 75 BPM

## 2024-01-22 PROCEDURE — 3700000000 HC ANESTHESIA ATTENDED CARE: Performed by: INTERNAL MEDICINE

## 2024-01-22 PROCEDURE — 6360000002 HC RX W HCPCS

## 2024-01-22 PROCEDURE — 7100000010 HC PHASE II RECOVERY - FIRST 15 MIN: Performed by: INTERNAL MEDICINE

## 2024-01-22 PROCEDURE — 3700000001 HC ADD 15 MINUTES (ANESTHESIA): Performed by: INTERNAL MEDICINE

## 2024-01-22 PROCEDURE — 7100000011 HC PHASE II RECOVERY - ADDTL 15 MIN: Performed by: INTERNAL MEDICINE

## 2024-01-22 PROCEDURE — 2720000010 HC SURG SUPPLY STERILE: Performed by: INTERNAL MEDICINE

## 2024-01-22 PROCEDURE — 2580000003 HC RX 258

## 2024-01-22 PROCEDURE — 3609017100 HC EGD: Performed by: INTERNAL MEDICINE

## 2024-01-22 PROCEDURE — 2580000003 HC RX 258: Performed by: INTERNAL MEDICINE

## 2024-01-22 RX ORDER — SODIUM CHLORIDE 9 MG/ML
INJECTION, SOLUTION INTRAVENOUS CONTINUOUS PRN
Status: DISCONTINUED | OUTPATIENT
Start: 2024-01-22 | End: 2024-01-22 | Stop reason: SDUPTHER

## 2024-01-22 RX ADMIN — SODIUM CHLORIDE: 9 INJECTION, SOLUTION INTRAVENOUS at 11:03

## 2024-01-22 RX ADMIN — SODIUM CHLORIDE: 9 INJECTION, SOLUTION INTRAVENOUS at 09:50

## 2024-01-22 RX ADMIN — PROPOFOL 30 MG: 10 INJECTION, EMULSION INTRAVENOUS at 11:15

## 2024-01-22 RX ADMIN — PROPOFOL 20 MG: 10 INJECTION, EMULSION INTRAVENOUS at 11:12

## 2024-01-22 RX ADMIN — PROPOFOL 50 MG: 10 INJECTION, EMULSION INTRAVENOUS at 11:10

## 2024-01-22 ASSESSMENT — PAIN - FUNCTIONAL ASSESSMENT
PAIN_FUNCTIONAL_ASSESSMENT: NONE - DENIES PAIN
PAIN_FUNCTIONAL_ASSESSMENT: NONE - DENIES PAIN
PAIN_FUNCTIONAL_ASSESSMENT: 0-10

## 2024-01-22 NOTE — PROGRESS NOTES
EGD completed., Pictures taken. APC completed on stomach settings. Grounding pad applied and removed, Skin intact.  Pt tolerated well.     Scope  Used.

## 2024-01-22 NOTE — PROGRESS NOTES
Recovery mode. Denies discomfort, taking fluids.  discussed findings and plan of care with patient and . Discharge instructions provided, understanding verbalized.

## 2024-01-22 NOTE — ANESTHESIA PRE PROCEDURE
Department of Anesthesiology  Preprocedure Note       Name:  Lulu Werner   Age:  89 y.o.  :  1935                                          MRN:  [id number]         Date:  2024      Surgeon: Surgeon(s):  Dianelys Sims MD    Procedure: Procedure(s):  EGD with APC    Medications prior to admission:   Prior to Admission medications    Medication Sig Start Date End Date Taking? Authorizing Provider   famotidine (PEPCID) 20 MG tablet Take 1 tablet by mouth 2 times daily 24   Trang Atwood APRN - CNP   famotidine (PEPCID) 20 MG tablet Take 1 tablet by mouth 2 times daily 24  Trang Atwood APRN - CNP   ferrous sulfate (IRON 325) 325 (65 Fe) MG tablet Take 1 tablet by mouth daily 23   Nirav Rios MD   potassium chloride (KLOR-CON M) 10 MEQ extended release tablet Take 1 tablet by mouth daily 23   Monique Rdz APRN - CNP   metoprolol tartrate (LOPRESSOR) 50 MG tablet Take 1 tablet by mouth 2 times daily 23   Monique Rdz APRN - CNP   dilTIAZem (CARDIZEM CD) 120 MG extended release capsule Take 1 capsule by mouth daily 23   Monique Rdz APRN - CNP   bumetanide (BUMEX) 1 MG tablet Take 1 tablet by mouth daily 23   Monique Rdz APRN - CNP   multivitamin-iron-minerals-folic acid (CENTRUM) chewable tablet Take by mouth daily    Nirav Rios MD   Multiple Vitamins-Minerals (PRESERVISION AREDS 2 PO) Take 1 tablet by mouth in the morning and at bedtime    Nirav Rios MD   Omega-3 Fatty Acids (FISH OIL) 1000 MG capsule Take by mouth daily    Nirav Rios MD   triamcinolone (KENALOG) 0.1 % cream Apply topically 2 times daily Apply topically 2 times daily.    Nirav Rios MD       Current medications:    Current Facility-Administered Medications   Medication Dose Route Frequency Provider Last Rate Last Admin    sodium chloride flush 0.9 % injection 5-40 mL  5-40 mL IntraVENous 2 times per day

## 2024-01-22 NOTE — BRIEF OP NOTE
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]     * Gastritis and gastroduodenitis [K29.70, K29.90]    Post-Op Diagnosis: Gastric antral vascular ectasia treated with argon plasma coagulator and gastritis       Procedure(s):  EGD with APC    Surgeon(s):  Dianelys Sims MD    Assistant:  * No surgical staff found *    Anesthesia: Monitor Anesthesia Care    Estimated Blood Loss (mL): none    Complications: None    Specimens:   * No specimens in log *    Implants:  * No implants in log *      Drains: * No LDAs found *    Findings:  Gastric antral vascular ectasia treated with argon plasma coagulator and gastritis      Electronically signed by Dianelys Sims MD on 1/22/2024 at 11:21 AM

## 2024-01-22 NOTE — ANESTHESIA POSTPROCEDURE EVALUATION
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    Derek Phase I: Derek Score: 10    Derek Phase II:      Anesthesia Post Evaluation    Patient location during evaluation: bedside  Patient participation: complete - patient participated  Level of consciousness: awake  Pain score: 0  Airway patency: patent  Nausea & Vomiting: no nausea and no vomiting  Cardiovascular status: hemodynamically stable and blood pressure returned to baseline  Respiratory status: room air, nonlabored ventilation and spontaneous ventilation  Hydration status: stable        No notable events documented.

## 2024-01-22 NOTE — H&P
[facility]     Sedation/Analgesia History & Physical     Patient: Lulu Werner : 1935  [facility] Rec#: [id number] Acc#: [id number]   Provider Performing Procedure: Dianelys Sims MD  Primary Care Physician: Yayo Wren, PATRICIA - DAVID    PRE-PROCEDURE   Full CODE [x]Yes  DNR-CCA/DNR-CC []Yes   Brief History/Pre-Procedure Diagnosis:     1. S/P endoscopy          2. GAVE (gastric antral vascular ectasia)  famotidine (PEPCID) 20 MG tablet     famotidine (PEPCID) 20 MG tablet     ESOPHAGOSCOPY / EGD     Ferritin     Iron and TIBC     IRON SATURATION     Vitamin B12 & Folate     CBC       3. Gastritis and duodenitis  famotidine (PEPCID) 20 MG tablet     famotidine (PEPCID) 20 MG tablet     ESOPHAGOSCOPY / EGD       4. Iron deficiency anemia due to chronic blood loss  Ferritin     Iron and TIBC     IRON SATURATION     Vitamin B12 & Folate     CBC       5. Gastroesophageal reflux disease without esophagitis  famotidine (PEPCID) 20 MG tablet     famotidine (PEPCID) 20 MG tablet       6. Flatulence/gas pain/belching             We had a long discussion in the office today about the findings of her EGD.  Patient did not fully understand what GAVE syndrome was.  I did take the time to explain it to her and we also did Internet research and I showed her the educational information there as well.  We discussed repeating EGD with use of argon plasma coagulator to cauterize the GAVE to prevent any further bleeding.  I did explain to the patient that this will be a chronic condition and she may need more than one EGD in the future to control the condition.  I did explain to the patient that she is higher risk to re-bleed and given this condition, however, if cardiology would like to restart her anticoagulation given her atrial fibrillation, the benefit of the medication would outweigh the risk of re-bleeding.  From a GI standpoint, patient can restart her oral anticoagulation if cardiology requests.

## 2024-01-23 NOTE — OP NOTE
[address] [address]                                OPERATIVE REPORT    PATIENT NAME: TRUE ALMEIDA                   :        1935  MED REC NO:   [id number]                           ROOM:  ACCOUNT NO:   [id number]                           ADMIT DATE: 2024  PROVIDER:     Dianelys Sims M.D.    DATE OF PROCEDURE:  2024    SURGEON:  Dianelys Sims MD    INDICATIONS:  Iron-deficiency anemia due to chronic blood loss, issue  with gastric antral vascular ectasia.  Plan today to evaluate with  treatment of the ectasia with argon plasma coagulator to control the GI  bleeding.    ASA CLASSIFICATION:  Please see Anesthesia note.    ESTIMATED BLOOD LOSS:  None.    PROCEDURE PERFORMED:  EGD with treatment of argon plasma coagulator of  gastric antral vascular ectasia.    DESCRIPTION OF THE PROCEDURE:  The patient was brought to GI lab.   Consent was obtained.  Risks involved with the procedure were explained  to the patient.  Informed consent was obtained.  The patient was  monitored during the procedure with pulse oximetry, blood pressure  monitoring, oxygen by nasal cannula.  Sedation by incremental doses of  IV propofol was given by the Anesthesia Service to achieve monitored  anesthesia care.  For ASA classification and medication given during the  procedure, please see Anesthesia note.    A standard video upper scope was advanced from the oral cavity up to the  duodenum.  Esophagus appeared normal.  Scope was advanced to the  stomach, retroflexed.  Examination of the cardia revealed normal cardia.  Significant gastric antral vascular ectasia seen in the antrum treated   argon plasma coagulator.  Scope was advanced to the duodenum which  appears normal.  Scope was withdrawn with no immediate complication.    IMPRESSION:  Gastric antral vascular ectasia, easy to bleed, easy to  ooze the blood, antrum treated with argon

## 2024-02-02 ENCOUNTER — HOSPITAL ENCOUNTER (OUTPATIENT)
Dept: INTERVENTIONAL RADIOLOGY/VASCULAR | Age: 89
End: 2024-02-02
Attending: INTERNAL MEDICINE
Payer: MEDICARE

## 2024-02-02 ENCOUNTER — OFFICE VISIT (OUTPATIENT)
Dept: CARDIOLOGY CLINIC | Age: 89
End: 2024-02-02

## 2024-02-02 ENCOUNTER — TELEPHONE (OUTPATIENT)
Dept: CARDIOLOGY CLINIC | Age: 89
End: 2024-02-02

## 2024-02-02 VITALS
DIASTOLIC BLOOD PRESSURE: 78 MMHG | BODY MASS INDEX: 26.09 KG/M2 | WEIGHT: 152.8 LBS | HEART RATE: 86 BPM | SYSTOLIC BLOOD PRESSURE: 115 MMHG | HEIGHT: 64 IN

## 2024-02-02 DIAGNOSIS — I50.32 CHRONIC DIASTOLIC CONGESTIVE HEART FAILURE (HCC): Primary | ICD-10-CM

## 2024-02-02 DIAGNOSIS — R00.2 INTERMITTENT PALPITATIONS: ICD-10-CM

## 2024-02-02 DIAGNOSIS — R06.02 SOB (SHORTNESS OF BREATH) ON EXERTION: ICD-10-CM

## 2024-02-02 DIAGNOSIS — D50.0 IRON DEFICIENCY ANEMIA DUE TO CHRONIC BLOOD LOSS: ICD-10-CM

## 2024-02-02 DIAGNOSIS — Z09 HOSPITAL DISCHARGE FOLLOW-UP: ICD-10-CM

## 2024-02-02 DIAGNOSIS — R60.0 EDEMA OF LEFT LOWER EXTREMITY: Primary | ICD-10-CM

## 2024-02-02 DIAGNOSIS — K31.819 GAVE (GASTRIC ANTRAL VASCULAR ECTASIA): ICD-10-CM

## 2024-02-02 DIAGNOSIS — R60.0 EDEMA OF LEFT LOWER EXTREMITY: ICD-10-CM

## 2024-02-02 DIAGNOSIS — I10 PRIMARY HYPERTENSION: ICD-10-CM

## 2024-02-02 DIAGNOSIS — I48.91 ATRIAL FIBRILLATION WITH CONTROLLED VENTRICULAR RATE (HCC): ICD-10-CM

## 2024-02-02 DIAGNOSIS — R53.82 CHRONIC FATIGUE: ICD-10-CM

## 2024-02-02 PROBLEM — R06.09 DOE (DYSPNEA ON EXERTION): Status: ACTIVE | Noted: 2024-02-02

## 2024-02-02 PROBLEM — D50.9 IRON DEFICIENCY ANEMIA: Status: ACTIVE | Noted: 2024-02-02

## 2024-02-02 PROCEDURE — 93971 EXTREMITY STUDY: CPT

## 2024-02-02 NOTE — PROGRESS NOTES
Chief Complaint   Patient presents with    Follow-Up from Hospital    Atrial Fibrillation   PROVIDER:     Prabhakar Huston M.D.     CONSULT DATE:  06/25/2023     REASON FOR CONSULTATION:  New-onset atrial fibrillation with rapid  ventricular response and CHF     The patient presents for a post-hospital follow-up. She was discharged on 06/27/2023.  Treated for ZNew CHF and new atr fib converted to NSR on its own  and sent homer on new meds  Apixaban, bumex and cardizem and cefedinir      The patient presents for a 3-month follow-up.   And post  hospital follow up.    Dec 13, 2023  \"Pat scheduled for JESSICA-CV from CHF clinic due to worsening of sob recently despite being well diuresed    Pre-procedure work up showed moderate  anemia and  hence procedure cancelled because OAC should be stopped    D/w the pat at length and she is unhappy that it has to be cancelled   Family by bedside and understanding the scenario\"  Pat sent to pcp and GI to address aneamia AND POSSIBLE gi BLEEDING  -and today came for F/u    Hgb BETTER NOW FROM 8.9  DEC 2023 TO NOW 11 GM  P[AT STILL STATES NO IMRPROVEMENT IN HER SYMPTOMS    EKG done 12-.    No wt gain    Left leg Leg edema  +2 since June 2023- CHRONIC AND hX OF  TREATED DVT ON THIS LEG  No EDEMA ON THE rt LEG  No WT GAIN AT ALL    Feel pin prick for seconds occasional IN THE CHEST -CHRONIC  bUT -Denied chest pain      Patient was taken off the blood thinners due to low HGB and patient had an EGD done by Dr. Sims.  hAD EGD JAN 22, 2024 AND FOUND TO HAVE  GAVE SYNDROME AND TREATED WITH APC      COMPLAINS OF  fatigue AND PALPITATION      Denied cp, dizziness     Sob on exertion  unchanged     Occasional palpitation    No more leg edema      Past Surgical History:   Procedure Laterality Date    CARPAL TUNNEL RELEASE Bilateral     NECK SURGERY      TONSILLECTOMY      UPPER GASTROINTESTINAL ENDOSCOPY N/A 1/22/2024    EGD with APC performed by Dianelys Sims MD at [facility] ENDOSCOPY

## 2024-02-02 NOTE — PATIENT INSTRUCTIONS
Your Medical Assistant Today:  Edmundo  Your Provider for Today: Dr. Huston         You may receive a survey regarding the care you received during your visit.  Your input is valuable to us.  We encourage you to complete and return your survey.  We hope you will choose us in the future for your healthcare needs.

## 2024-02-06 NOTE — TELEPHONE ENCOUNTER
Pt saw Trang Atwood DAVID at GI on 2-2-24.  Okay to start anticoagulation medication?    Please see note from Trang Atwood:      Patient questioned if she would be able to take blood thinners for her atrial fibrillation. Her daughter spoke up and stated that there was suspicion of a possible blood clot in her leg. I did explain to the patient and her family that if the need of the blood thinner and the benefit of the blood thinner outweighs the risk of bleeding, then she had a GI clearance to proceed with anticoagulation. Patient requested that we call Dr. Huston to discuss this with him as well. I did place a call to Dr. Hustno via [facility] and did discuss this with Dr. Huston. As previously mentioned, from a GI standpoint, patient can trial oral anticoagulation, however, I recommend the lowest dose possible and not to use multiple agents. Patient as previously mentioned was instructed to be diligent and monitoring her stools for signs of upper GI bleeding as evidenced by melena. Patient verbalized understanding.

## 2024-02-10 NOTE — TELEPHONE ENCOUNTER
Start Apixaban 2.5 mg po qd  Advised to watch for any symptom of bleeding like black stool and any other obvious bleeding any where    Isolated left leg edema with venous doppler negative for DVT  Need to r/o May turnner syndrome  Please let us get -special test-CT venography ( CT with contrast with venous phase) of the left leg and pelvis  and abdominal veins-including Femoral veins, iliacs  veins and IVC.  May need to talk to radiology how to order it if there is difficulty. ( ELLEN Lux nurse may used to order it a little more common)

## 2024-02-12 NOTE — TELEPHONE ENCOUNTER
Spoke to patient, notified.   CTA venous ordered.  Pt states she has Eliquis at home but it is 5 mg.  Per Epic, 2.5 mg Eliquis was sent to [facility] in  on 2/2/24.  Called pharmacy, pharmacist confirms he does have that ready for patient.   Spoke to son Nba, notified him of Dr Huston's recommendations as well. Instructed him to get the 2.5 mg Eliquis picked up-he expressed understanding. Order brought to scheduling with instructions to call jamie Arango to schedule.

## 2024-02-13 NOTE — TELEPHONE ENCOUNTER
BHUMIKA Venous Abdomen/Pelvis w/runoff scheduled for   2/15/2024 at 1:20 to arrive at 12:50.  Npo 4 hrs .  Patients son Nba notified and voiced understanding.

## 2024-02-14 ENCOUNTER — TELEPHONE (OUTPATIENT)
Dept: CARDIOLOGY CLINIC | Age: 89
End: 2024-02-14

## 2024-02-14 NOTE — TELEPHONE ENCOUNTER
Dr Huston spoke with [place]ia in [place].  The order does not need changed.  We won't get the run off though, which Dr Huston is okay with.

## 2024-02-14 NOTE — TELEPHONE ENCOUNTER
Germania from [facility] ([id number]) called stating they do not do a run off on a Venous CTA.  They contrast does not show up in the legs because it takes too long to get to the legs.      Out for Dr Huston to review to see if he wants anything else ordered.

## 2024-02-15 ENCOUNTER — OFFICE VISIT (OUTPATIENT)
Dept: CARDIOLOGY CLINIC | Age: 89
End: 2024-02-15
Payer: MEDICARE

## 2024-02-15 ENCOUNTER — HOSPITAL ENCOUNTER (OUTPATIENT)
Dept: CT IMAGING | Age: 89
Discharge: HOME | End: 2024-02-15
Attending: INTERNAL MEDICINE
Payer: MEDICARE

## 2024-02-15 VITALS
SYSTOLIC BLOOD PRESSURE: 118 MMHG | WEIGHT: 151.6 LBS | BODY MASS INDEX: 25.88 KG/M2 | HEART RATE: 62 BPM | OXYGEN SATURATION: 98 % | DIASTOLIC BLOOD PRESSURE: 76 MMHG | HEIGHT: 64 IN

## 2024-02-15 DIAGNOSIS — I48.91 ATRIAL FIBRILLATION WITH CONTROLLED VENTRICULAR RATE (HCC): ICD-10-CM

## 2024-02-15 DIAGNOSIS — I50.30 HEART FAILURE WITH PRESERVED EJECTION FRACTION, NYHA CLASS II (HCC): Primary | ICD-10-CM

## 2024-02-15 DIAGNOSIS — R60.0 EDEMA OF LEFT LOWER EXTREMITY: ICD-10-CM

## 2024-02-15 DIAGNOSIS — Z91.89 AT RISK FOR FLUID VOLUME OVERLOAD: ICD-10-CM

## 2024-02-15 DIAGNOSIS — D50.0 IRON DEFICIENCY ANEMIA DUE TO CHRONIC BLOOD LOSS: ICD-10-CM

## 2024-02-15 LAB — POC CREATININE WHOLE BLOOD: 1 MG/DL (ref 0.5–1.2)

## 2024-02-15 PROCEDURE — 99214 OFFICE O/P EST MOD 30 MIN: CPT | Performed by: NURSE PRACTITIONER

## 2024-02-15 PROCEDURE — 1090F PRES/ABSN URINE INCON ASSESS: CPT | Performed by: NURSE PRACTITIONER

## 2024-02-15 PROCEDURE — 1036F TOBACCO NON-USER: CPT | Performed by: NURSE PRACTITIONER

## 2024-02-15 PROCEDURE — G8482 FLU IMMUNIZE ORDER/ADMIN: HCPCS | Performed by: NURSE PRACTITIONER

## 2024-02-15 PROCEDURE — 6360000004 HC RX CONTRAST MEDICATION: Performed by: INTERNAL MEDICINE

## 2024-02-15 PROCEDURE — 74174 CTA ABD&PLVS W/CONTRAST: CPT

## 2024-02-15 PROCEDURE — 1123F ACP DISCUSS/DSCN MKR DOCD: CPT | Performed by: NURSE PRACTITIONER

## 2024-02-15 PROCEDURE — G8419 CALC BMI OUT NRM PARAM NOF/U: HCPCS | Performed by: NURSE PRACTITIONER

## 2024-02-15 PROCEDURE — 82565 ASSAY OF CREATININE: CPT

## 2024-02-15 PROCEDURE — G8427 DOCREV CUR MEDS BY ELIG CLIN: HCPCS | Performed by: NURSE PRACTITIONER

## 2024-02-15 RX ADMIN — IOPAMIDOL 95 ML: 755 INJECTION, SOLUTION INTRAVENOUS at 12:40

## 2024-02-15 NOTE — PATIENT INSTRUCTIONS
You may receive a survey regarding the care you received during your visit.  Your input is valuable to us.  We encourage you to complete and return your survey.  We hope you will choose us in the future for your healthcare needs.    Your nurses today were Delmy.  Office hours:   Mon-Thurs 8-4:30  Friday 8-12  Phone: [phone]    Continue:  Continue current medications  Daily weights and record  Fluid restriction of 2 Liters per day  Limit sodium in diet to around 4899-5827 mg/day  Monitor BP  Activity as tolerated     Call the [facility] for any of the following symptoms:   Weight gain of 2-3 pounds in 1 day or 5 pounds in 1 week  Increased shortness of breath  Shortness of breath while laying down  Cough  Chest pain  Swelling in feet, ankles or legs  Bloating in abdomen  Fatigue

## 2024-02-15 NOTE — PROGRESS NOTES
ventricular size appears normal with normal systolic function   and wall thickness.   Mild mitral regurgitation is present.      Signature      ----------------------------------------------------------------   Electronically signed by Daron Ovalle MD (Interpreting   physician) on 06/27/2023 at 04:52 PM   ----------------------------------------------------------------    CATH/STRESS:   none      Results reviewed:  BNP: No results found for: \"BNP\"  CBC:   Lab Results   Component Value Date/Time    WBC 5.3 01/12/2024 09:35 AM    WBC 5.1 12/13/2023 12:26 PM    RBC 3.88 01/12/2024 09:35 AM    HGB 11.0 01/12/2024 09:35 AM    HCT 33.7 01/12/2024 09:35 AM     01/12/2024 09:35 AM     CMP:    Lab Results   Component Value Date/Time     12/13/2023 12:26 PM    K 4.6 12/13/2023 12:26 PM    K 4.0 06/27/2023 03:34 AM     12/13/2023 12:26 PM    CO2 24 12/13/2023 12:26 PM    BUN 23 12/13/2023 12:26 PM    CREATININE 1.1 12/13/2023 12:26 PM    LABGLOM 48 12/13/2023 12:26 PM    GLUCOSE 107 12/13/2023 12:26 PM    CALCIUM 9.3 12/13/2023 12:26 PM     Hepatic Function Panel:    Lab Results   Component Value Date/Time    ALKPHOS 66 12/13/2023 12:26 PM    ALT 14 12/13/2023 12:26 PM    AST 22 12/13/2023 12:26 PM    PROT 5.9 12/13/2023 12:26 PM    BILITOT 0.4 12/13/2023 12:26 PM    BILIDIR <0.2 06/25/2023 04:09 AM    LABALBU 3.9 12/13/2023 12:26 PM     Magnesium:    Lab Results   Component Value Date/Time    MG 2.1 10/11/2023 01:11 PM     PT/INR:    Lab Results   Component Value Date/Time    INR 1.71 12/13/2023 12:26 PM     Lipids:    Lab Results   Component Value Date/Time    TRIG 108 12/13/2023 12:26 PM    HDL 35 12/13/2023 12:26 PM    LDLCALC 88 12/13/2023 12:26 PM       ASSESSMENT AND PLAN:   The patient's condition/symptoms are stable        Diagnosis Orders   1. Heart failure with preserved ejection fraction, NYHA class II-III, stage C (formerly [facility])        2. Atrial fibrillation with controlled ventricular rate (formerly [facility])

## 2024-04-18 ENCOUNTER — TELEPHONE (OUTPATIENT)
Dept: CARDIOLOGY CLINIC | Age: 89
End: 2024-04-18
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
## 2024-04-23 ENCOUNTER — TELEPHONE (OUTPATIENT)
Dept: CARDIOLOGY CLINIC | Age: 89
End: 2024-04-23

## 2024-04-23 ENCOUNTER — OFFICE VISIT (OUTPATIENT)
Dept: CARDIOLOGY CLINIC | Age: 89
End: 2024-04-23
Payer: MEDICARE

## 2024-04-23 VITALS
SYSTOLIC BLOOD PRESSURE: 138 MMHG | DIASTOLIC BLOOD PRESSURE: 82 MMHG | WEIGHT: 149 LBS | HEART RATE: 88 BPM | BODY MASS INDEX: 25.44 KG/M2 | HEIGHT: 64 IN

## 2024-04-23 DIAGNOSIS — I48.0 PAROXYSMAL ATRIAL FIBRILLATION (HCC): Primary | ICD-10-CM

## 2024-04-23 DIAGNOSIS — K31.819 GAVE (GASTRIC ANTRAL VASCULAR ECTASIA): ICD-10-CM

## 2024-04-23 PROCEDURE — G8427 DOCREV CUR MEDS BY ELIG CLIN: HCPCS | Performed by: INTERNAL MEDICINE

## 2024-04-23 PROCEDURE — G8419 CALC BMI OUT NRM PARAM NOF/U: HCPCS | Performed by: INTERNAL MEDICINE

## 2024-04-23 PROCEDURE — 1090F PRES/ABSN URINE INCON ASSESS: CPT | Performed by: INTERNAL MEDICINE

## 2024-04-23 PROCEDURE — 1036F TOBACCO NON-USER: CPT | Performed by: INTERNAL MEDICINE

## 2024-04-23 PROCEDURE — 1123F ACP DISCUSS/DSCN MKR DOCD: CPT | Performed by: INTERNAL MEDICINE

## 2024-04-23 PROCEDURE — 99215 OFFICE O/P EST HI 40 MIN: CPT | Performed by: INTERNAL MEDICINE

## 2024-04-23 RX ORDER — LATANOPROST 0.005 %
1 DROPS OPHTHALMIC (EYE) DAILY
COMMUNITY
Start: 2024-04-09 | End: 2025-04-09

## 2024-04-23 NOTE — PROGRESS NOTES
New to Dr. Lux, here to discuss watchman.  Cardiologist-Dr. Huston.  
and intact distal pulses.    No murmur heard.  Pulmonary/Chest: Effort normal and breath sounds normal. No respiratory distress. No wheezes. No rales.   Abdominal: Soft. Bowel sounds are normal. No distension. There is no tenderness.   Musculoskeletal: Normal range of motion. No edema.   Neurological: Alert and oriented to person, place, and time. No cranial nerve deficit. Coordination normal.   Skin: Skin is warm and dry.   Psychiatric: Normal mood and affect.       No results found for: \"CKTOTAL\", \"CKMB\", \"CKMBINDEX\"    Lab Results   Component Value Date/Time    WBC 4.8 03/08/2024 09:12 AM    WBC 5.1 12/13/2023 12:26 PM    RBC 3.59 03/08/2024 09:12 AM    HGB 10.7 03/08/2024 09:12 AM    HCT 32.4 03/08/2024 09:12 AM    MCV 90.4 03/08/2024 09:12 AM    MCH 29.7 03/08/2024 09:12 AM    MCHC 32.9 03/08/2024 09:12 AM    RDW 18.1 03/08/2024 09:12 AM     03/08/2024 09:12 AM    MPV 9.4 12/13/2023 12:26 PM       Lab Results   Component Value Date/Time     12/13/2023 12:26 PM    K 4.6 12/13/2023 12:26 PM    K 4.0 06/27/2023 03:34 AM     12/13/2023 12:26 PM    CO2 24 12/13/2023 12:26 PM    BUN 23 12/13/2023 12:26 PM    CREATININE 1.1 12/13/2023 12:26 PM    CALCIUM 9.3 12/13/2023 12:26 PM    LABGLOM 48 12/13/2023 12:26 PM    GLUCOSE 107 12/13/2023 12:26 PM       Lab Results   Component Value Date/Time    ALKPHOS 66 12/13/2023 12:26 PM    ALT 14 12/13/2023 12:26 PM    AST 22 12/13/2023 12:26 PM    PROT 5.9 12/13/2023 12:26 PM    BILITOT 0.4 12/13/2023 12:26 PM    BILIDIR <0.2 06/25/2023 04:09 AM       Lab Results   Component Value Date/Time    MG 2.1 10/11/2023 01:11 PM       Lab Results   Component Value Date    INR 1.71 (H) 12/13/2023    INR 0.98 06/27/2023    INR 0.99 06/24/2023         No results found for: \"LABA1C\"    Lab Results   Component Value Date/Time    TRIG 108 12/13/2023 12:26 PM    HDL 35 12/13/2023 12:26 PM    LDLCALC 88 12/13/2023 12:26 PM       Lab Results   Component Value Date/Time    TSH

## 2024-04-23 NOTE — PATIENT INSTRUCTIONS
Your nurses today were SIVAN Mitchell and DEMETRICE Whipple  Your provider today was Dr. Lux  Phone number: [phone]      You may receive a survey regarding the care you received during your visit.  Your input is valuable to us.  We encourage you to complete and return your survey.  We hope you will choose us in the future for your healthcare needs.

## 2024-04-23 NOTE — TELEPHONE ENCOUNTER
Patient met with Dr Lux today to discuss the Watchman and she would like to move forward with the process. She still wants to think about it but would like for me to send her WSDF.     WSDF: Dianelys Sims MD  Phone: [phone]  Fax: [phone]

## 2024-04-29 ENCOUNTER — TELEPHONE (OUTPATIENT)
Dept: CARDIOLOGY CLINIC | Age: 89
End: 2024-04-29

## 2024-04-29 NOTE — TELEPHONE ENCOUNTER
Spoke with patient  She does not feeling like anything is being done for her  Does not want to reschedule in CHF clinic until after watchman procedure

## 2024-04-29 NOTE — TELEPHONE ENCOUNTER
Patient left message needing to r/s appointment from 6/3    Returned call to patient   No answer  [facility]

## 2024-05-08 ENCOUNTER — TELEPHONE (OUTPATIENT)
Dept: CARDIOLOGY CLINIC | Age: 89
End: 2024-05-08

## 2024-05-08 NOTE — TELEPHONE ENCOUNTER
Patient LM on CHF nurse line to see if Eliquis was sent to pharmacy.   I called her back but [facility], no VM.   Eliquis sent to [facility] yesterday 5/7/2024.

## 2024-05-30 RX ORDER — DILTIAZEM HYDROCHLORIDE 120 MG/1
CAPSULE, COATED, EXTENDED RELEASE ORAL DAILY
Qty: 90 CAPSULE | Refills: 1 | Status: SHIPPED | OUTPATIENT
Start: 2024-05-30

## 2024-09-06 ENCOUNTER — OFFICE VISIT (OUTPATIENT)
Dept: CARDIOLOGY CLINIC | Age: 89
End: 2024-09-06
Payer: MEDICARE

## 2024-09-06 VITALS
HEIGHT: 64 IN | BODY MASS INDEX: 24.62 KG/M2 | SYSTOLIC BLOOD PRESSURE: 118 MMHG | DIASTOLIC BLOOD PRESSURE: 83 MMHG | WEIGHT: 144.2 LBS | HEART RATE: 105 BPM

## 2024-09-06 DIAGNOSIS — R06.02 SOB (SHORTNESS OF BREATH) ON EXERTION: ICD-10-CM

## 2024-09-06 DIAGNOSIS — I10 PRIMARY HYPERTENSION: ICD-10-CM

## 2024-09-06 DIAGNOSIS — I50.32 CHRONIC DIASTOLIC CONGESTIVE HEART FAILURE (HCC): Primary | ICD-10-CM

## 2024-09-06 DIAGNOSIS — I48.0 PAF (PAROXYSMAL ATRIAL FIBRILLATION) (HCC): ICD-10-CM

## 2024-09-06 DIAGNOSIS — R60.0 EDEMA OF LEFT LOWER EXTREMITY: ICD-10-CM

## 2024-09-06 DIAGNOSIS — D50.0 IRON DEFICIENCY ANEMIA DUE TO CHRONIC BLOOD LOSS: ICD-10-CM

## 2024-09-06 DIAGNOSIS — R53.82 CHRONIC FATIGUE: ICD-10-CM

## 2024-09-06 PROCEDURE — 99214 OFFICE O/P EST MOD 30 MIN: CPT | Performed by: INTERNAL MEDICINE

## 2024-09-06 PROCEDURE — 1036F TOBACCO NON-USER: CPT | Performed by: INTERNAL MEDICINE

## 2024-09-06 PROCEDURE — G8427 DOCREV CUR MEDS BY ELIG CLIN: HCPCS | Performed by: INTERNAL MEDICINE

## 2024-09-06 PROCEDURE — 1123F ACP DISCUSS/DSCN MKR DOCD: CPT | Performed by: INTERNAL MEDICINE

## 2024-09-06 PROCEDURE — 1090F PRES/ABSN URINE INCON ASSESS: CPT | Performed by: INTERNAL MEDICINE

## 2024-09-06 PROCEDURE — G8420 CALC BMI NORM PARAMETERS: HCPCS | Performed by: INTERNAL MEDICINE

## 2024-09-06 NOTE — PROGRESS NOTES
Amoxicillin-Pot Clavulanate Diarrhea    Propoxyphene      Other Reaction(s): Unknown        History reviewed. No pertinent family history.     Social History     Socioeconomic History    Marital status:      Spouse name: 4    Number of children: Not on file    Years of education: Not on file    Highest education level: Not on file   Occupational History    Not on file   Tobacco Use    Smoking status: Former     Current packs/day: 0.00     Types: Cigarettes     Quit date: 1960     Years since quittin.7    Smokeless tobacco: Never   Vaping Use    Vaping status: Never Used   Substance and Sexual Activity    Alcohol use: Yes     Comment: occasionally    Drug use: Never    Sexual activity: Not on file   Other Topics Concern    Not on file   Social History Narrative    Not on file     Social Determinants of Health     Financial Resource Strain: Low Risk  (2023)    [facility]    Overall Financial Resource Strain (CARDIA)     Difficulty of Paying Living Expenses: Not very hard   Food Insecurity: No Food Insecurity (2023)    Received from [facility], [facility]    Hunger Vital Sign     Worried About Running Out of Food in the Last Year: Never true     Ran Out of Food in the Last Year: Never true   Transportation Needs: No Transportation Needs (2023)    Received from [facility], [facility]    PRAPARE - Transportation     Lack of Transportation (Medical): No     Lack of Transportation (Non-Medical): No   Physical Activity: Insufficiently Active (2023)    Received from [facility], [facility]    Exercise Vital Sign     Days of Exercise per Week: 2 days     Minutes of Exercise per Session: 30 min

## 2024-09-17 ENCOUNTER — TELEPHONE (OUTPATIENT)
Dept: CARDIOLOGY CLINIC | Age: 89
End: 2024-09-17

## 2024-09-17 DIAGNOSIS — I48.0 PAROXYSMAL ATRIAL FIBRILLATION (HCC): Primary | ICD-10-CM

## 2024-09-17 DIAGNOSIS — I48.0 PAROXYSMAL A-FIB (HCC): ICD-10-CM

## 2024-09-17 RX ORDER — ASPIRIN 81 MG/1
81 TABLET ORAL DAILY
Qty: 90 TABLET | Refills: 1 | Status: SHIPPED | OUTPATIENT
Start: 2024-09-24

## 2024-09-17 RX ORDER — CLOPIDOGREL BISULFATE 75 MG/1
75 TABLET ORAL DAILY
Qty: 90 TABLET | Refills: 1 | Status: SHIPPED | OUTPATIENT
Start: 2024-09-24

## 2024-09-24 LAB
BASOPHILS ABSOLUTE: 0.04 /ΜL
BASOPHILS ABSOLUTE: 0.04 K/UL (ref 0–0.2)
BASOPHILS RELATIVE PERCENT: 0.5 % (ref 0–2)
BASOPHILS RELATIVE PERCENT: NORMAL
BUN BLDV-MCNC: 23 MG/DL (ref 8–23)
BUN BLDV-MCNC: NORMAL MG/DL
CALCIUM SERPL-MCNC: 9 MG/DL
CALCIUM SERPL-MCNC: 9 MG/DL (ref 8.6–10.5)
CHLORIDE BLD-SCNC: 101 MMOL/L
CHLORIDE BLD-SCNC: 101 MMOL/L (ref 96–107)
CO2: 24 MMOL/L
CO2: 24 MMOL/L (ref 18–32)
CREAT SERPL-MCNC: 1.26 MG/DL
CREAT SERPL-MCNC: 1.26 MG/DL (ref 0.51–1.15)
EGFR IF NONAFRICAN AMERICAN: 41 ML/MIN/1.73M2
EGFR: 41
EOSINOPHILS ABSOLUTE: 0.19 /ΜL
EOSINOPHILS ABSOLUTE: 0.19 K/UL (ref 0–0.8)
EOSINOPHILS RELATIVE PERCENT: 2.5 % (ref 0–5)
EOSINOPHILS RELATIVE PERCENT: NORMAL
GLUCOSE BLD-MCNC: 117 MG/DL
GLUCOSE: 117 MG/DL (ref 70–100)
HCT VFR BLD CALC: 39.5 % (ref 35–47)
HCT VFR BLD CALC: 39.5 % (ref 36–46)
HEMOGLOBIN: 12.6 G/DL (ref 11.9–16)
HEMOGLOBIN: 12.6 G/DL (ref 12–16)
IMMATURE GRANS (ABS): 0.15 K/UL (ref 0–0.06)
IMMATURE GRANULOCYTES %: 2 % (ref 0–2)
LYMPHOCYTES ABSOLUTE: 0.84 /ΜL
LYMPHOCYTES ABSOLUTE: 0.84 K/UL (ref 0.9–5.2)
LYMPHOCYTES RELATIVE PERCENT: 10.9 % (ref 20–45)
LYMPHOCYTES RELATIVE PERCENT: NORMAL
MCH RBC QN AUTO: 31.2 PG
MCH RBC QN AUTO: 31.2 PG (ref 26–33)
MCHC RBC AUTO-ENTMCNC: 31.9 G/DL
MCHC RBC AUTO-ENTMCNC: 31.9 G/DL (ref 32–35)
MCV RBC AUTO: 98 FL
MCV RBC AUTO: 98 FL (ref 75–100)
MONOCYTES ABSOLUTE: 0.58 /ΜL
MONOCYTES ABSOLUTE: 0.58 K/UL (ref 0.1–1)
MONOCYTES RELATIVE PERCENT: 7.5 % (ref 0–13)
MONOCYTES RELATIVE PERCENT: NORMAL
NEUTROPHILS ABSOLUTE: 5.89 /ΜL
NEUTROPHILS ABSOLUTE: 5.89 K/UL (ref 1.9–8)
NEUTROPHILS RELATIVE PERCENT: 76.6 % (ref 45–75)
NEUTROPHILS RELATIVE PERCENT: NORMAL
PDW BLD-RTO: 14.8 % (ref 11.2–14.8)
PLATELET # BLD: 335 K/ΜL
PLATELET # BLD: 335 THOUS/CMM (ref 140–440)
PMV BLD AUTO: NORMAL FL
POTASSIUM SERPL-SCNC: 4.1 MMOL/L
POTASSIUM SERPL-SCNC: 4.1 MMOL/L (ref 3.5–5.4)
RBC # BLD: 4.04 10^6/ΜL
RBC # BLD: 4.04 MILL/CMM (ref 3.8–5.2)
SODIUM BLD-SCNC: 141 MMOL/L
SODIUM BLD-SCNC: 141 MMOL/L (ref 135–148)
WBC # BLD: 7.7 10^3/ML
WBC # BLD: 7.7 THDS/CMM (ref 3.6–11)

## 2024-09-25 ENCOUNTER — PREP FOR PROCEDURE (OUTPATIENT)
Dept: CARDIOLOGY | Age: 89
End: 2024-09-25

## 2024-09-25 RX ORDER — SODIUM CHLORIDE 0.9 % (FLUSH) 0.9 %
5-40 SYRINGE (ML) INJECTION EVERY 12 HOURS SCHEDULED
Status: CANCELLED | OUTPATIENT
Start: 2024-09-25

## 2024-09-25 RX ORDER — CHLORHEXIDINE GLUCONATE 40 MG/ML
SOLUTION TOPICAL ONCE
Status: CANCELLED | OUTPATIENT
Start: 2024-09-25 | End: 2024-09-25

## 2024-09-25 RX ORDER — SODIUM CHLORIDE 9 MG/ML
INJECTION, SOLUTION INTRAVENOUS PRN
Status: CANCELLED | OUTPATIENT
Start: 2024-09-25

## 2024-09-25 RX ORDER — SODIUM CHLORIDE 9 MG/ML
INJECTION, SOLUTION INTRAVENOUS CONTINUOUS
Status: CANCELLED | OUTPATIENT
Start: 2024-09-25

## 2024-09-25 RX ORDER — SODIUM CHLORIDE 0.9 % (FLUSH) 0.9 %
5-40 SYRINGE (ML) INJECTION PRN
Status: CANCELLED | OUTPATIENT
Start: 2024-09-25

## 2024-09-26 ENCOUNTER — HOSPITAL ENCOUNTER (INPATIENT)
Age: 89
LOS: 1 days | Discharge: HOME | DRG: 274 | End: 2024-09-27
Attending: INTERNAL MEDICINE | Admitting: INTERNAL MEDICINE
Payer: MEDICARE

## 2024-09-26 ENCOUNTER — APPOINTMENT (OUTPATIENT)
Age: 89
DRG: 274 | End: 2024-09-26
Attending: INTERNAL MEDICINE
Payer: MEDICARE

## 2024-09-26 DIAGNOSIS — I48.0 PAROXYSMAL A-FIB (HCC): ICD-10-CM

## 2024-09-26 LAB
ABO: NORMAL
ACTIVATED CLOTTING TIME: 287 SECONDS (ref 1–150)
ALBUMIN SERPL BCG-MCNC: 4.1 G/DL (ref 3.5–5.1)
ALP SERPL-CCNC: 75 U/L (ref 38–126)
ALT SERPL W/O P-5'-P-CCNC: 18 U/L (ref 11–66)
ANION GAP SERPL CALC-SCNC: 15 MEQ/L (ref 8–16)
ANTIBODY SCREEN: NORMAL
APTT PPP: 28.9 SECONDS (ref 22–38)
AST SERPL-CCNC: 25 U/L (ref 5–40)
BILIRUB SERPL-MCNC: 0.5 MG/DL (ref 0.3–1.2)
BUN SERPL-MCNC: 23 MG/DL (ref 7–22)
CALCIUM SERPL-MCNC: 9.2 MG/DL (ref 8.5–10.5)
CHLORIDE SERPL-SCNC: 101 MEQ/L (ref 98–111)
CO2 SERPL-SCNC: 22 MEQ/L (ref 23–33)
CREAT SERPL-MCNC: 1.1 MG/DL (ref 0.4–1.2)
DEPRECATED RDW RBC AUTO: 61.5 FL (ref 35–45)
ECHO BSA: 1.66 M2
ECHO BSA: 1.66 M2
ECHO LV EF PHYSICIAN: 55 %
EKG Q-T INTERVAL: 350 MS
EKG QRS DURATION: 84 MS
EKG QTC CALCULATION (BAZETT): 451 MS
EKG R AXIS: 147 DEGREES
EKG T AXIS: 43 DEGREES
EKG VENTRICULAR RATE: 100 BPM
ERYTHROCYTE [DISTWIDTH] IN BLOOD BY AUTOMATED COUNT: 16.9 % (ref 11.5–14.5)
GFR SERPL CREATININE-BSD FRML MDRD: 48 ML/MIN/1.73M2
GLUCOSE SERPL-MCNC: 105 MG/DL (ref 70–108)
HCT VFR BLD AUTO: 38.8 % (ref 37–47)
HGB BLD-MCNC: 12.4 GM/DL (ref 12–16)
INR PPP: 1.1 (ref 0.85–1.13)
MCH RBC QN AUTO: 31.6 PG (ref 26–33)
MCHC RBC AUTO-ENTMCNC: 32 GM/DL (ref 32.2–35.5)
MCV RBC AUTO: 99 FL (ref 81–99)
NT-PROBNP SERPL IA-MCNC: 2804 PG/ML (ref 0–449)
PLATELET # BLD AUTO: 258 THOU/MM3 (ref 130–400)
PMV BLD AUTO: 9.7 FL (ref 9.4–12.4)
POTASSIUM SERPL-SCNC: 4.1 MEQ/L (ref 3.5–5.2)
PROT SERPL-MCNC: 6.3 G/DL (ref 6.1–8)
RBC # BLD AUTO: 3.92 MILL/MM3 (ref 4.2–5.4)
RH FACTOR: NORMAL
SODIUM SERPL-SCNC: 138 MEQ/L (ref 135–145)
WBC # BLD AUTO: 6.5 THOU/MM3 (ref 4.8–10.8)

## 2024-09-26 PROCEDURE — 86900 BLOOD TYPING SEROLOGIC ABO: CPT

## 2024-09-26 PROCEDURE — C1889 IMPLANT/INSERT DEVICE, NOC: HCPCS | Performed by: INTERNAL MEDICINE

## 2024-09-26 PROCEDURE — 93005 ELECTROCARDIOGRAM TRACING: CPT | Performed by: PHYSICIAN ASSISTANT

## 2024-09-26 PROCEDURE — 93010 ELECTROCARDIOGRAM REPORT: CPT | Performed by: NUCLEAR MEDICINE

## 2024-09-26 PROCEDURE — 83880 ASSAY OF NATRIURETIC PEPTIDE: CPT

## 2024-09-26 PROCEDURE — 85610 PROTHROMBIN TIME: CPT

## 2024-09-26 PROCEDURE — 6360000002 HC RX W HCPCS: Performed by: INTERNAL MEDICINE

## 2024-09-26 PROCEDURE — 7100000011 HC PHASE II RECOVERY - ADDTL 15 MIN: Performed by: INTERNAL MEDICINE

## 2024-09-26 PROCEDURE — 93308 TTE F-UP OR LMTD: CPT | Performed by: INTERNAL MEDICINE

## 2024-09-26 PROCEDURE — 86901 BLOOD TYPING SEROLOGIC RH(D): CPT

## 2024-09-26 PROCEDURE — C1894 INTRO/SHEATH, NON-LASER: HCPCS | Performed by: INTERNAL MEDICINE

## 2024-09-26 PROCEDURE — 93321 DOPPLER ECHO F-UP/LMTD STD: CPT | Performed by: INTERNAL MEDICINE

## 2024-09-26 PROCEDURE — 02L73DK OCCLUSION OF LEFT ATRIAL APPENDAGE WITH INTRALUMINAL DEVICE, PERCUTANEOUS APPROACH: ICD-10-PCS | Performed by: INTERNAL MEDICINE

## 2024-09-26 PROCEDURE — 6370000000 HC RX 637 (ALT 250 FOR IP): Performed by: INTERNAL MEDICINE

## 2024-09-26 PROCEDURE — 33340 PERQ CLSR TCAT L ATR APNDGE: CPT | Performed by: INTERNAL MEDICINE

## 2024-09-26 PROCEDURE — 85347 COAGULATION TIME ACTIVATED: CPT

## 2024-09-26 PROCEDURE — B24BZZ4 ULTRASONOGRAPHY OF HEART WITH AORTA, TRANSESOPHAGEAL: ICD-10-PCS | Performed by: INTERNAL MEDICINE

## 2024-09-26 PROCEDURE — 99153 MOD SED SAME PHYS/QHP EA: CPT | Performed by: INTERNAL MEDICINE

## 2024-09-26 PROCEDURE — 85027 COMPLETE CBC AUTOMATED: CPT

## 2024-09-26 PROCEDURE — 36415 COLL VENOUS BLD VENIPUNCTURE: CPT

## 2024-09-26 PROCEDURE — 6360000004 HC RX CONTRAST MEDICATION: Performed by: INTERNAL MEDICINE

## 2024-09-26 PROCEDURE — 2580000003 HC RX 258: Performed by: PHYSICIAN ASSISTANT

## 2024-09-26 PROCEDURE — 93308 TTE F-UP OR LMTD: CPT

## 2024-09-26 PROCEDURE — C1760 CLOSURE DEV, VASC: HCPCS | Performed by: INTERNAL MEDICINE

## 2024-09-26 PROCEDURE — 2140000000 HC CCU INTERMEDIATE R&B

## 2024-09-26 PROCEDURE — 6360000002 HC RX W HCPCS: Performed by: PHYSICIAN ASSISTANT

## 2024-09-26 PROCEDURE — 86923 COMPATIBILITY TEST ELECTRIC: CPT

## 2024-09-26 PROCEDURE — G0269 OCCLUSIVE DEVICE IN VEIN ART: HCPCS | Performed by: INTERNAL MEDICINE

## 2024-09-26 PROCEDURE — 2709999900 HC NON-CHARGEABLE SUPPLY: Performed by: INTERNAL MEDICINE

## 2024-09-26 PROCEDURE — 80053 COMPREHEN METABOLIC PANEL: CPT

## 2024-09-26 PROCEDURE — C1769 GUIDE WIRE: HCPCS | Performed by: INTERNAL MEDICINE

## 2024-09-26 PROCEDURE — 86850 RBC ANTIBODY SCREEN: CPT

## 2024-09-26 PROCEDURE — 7100000010 HC PHASE II RECOVERY - FIRST 15 MIN: Performed by: INTERNAL MEDICINE

## 2024-09-26 PROCEDURE — 2500000003 HC RX 250 WO HCPCS: Performed by: INTERNAL MEDICINE

## 2024-09-26 PROCEDURE — 99152 MOD SED SAME PHYS/QHP 5/>YRS: CPT | Performed by: INTERNAL MEDICINE

## 2024-09-26 PROCEDURE — 85730 THROMBOPLASTIN TIME PARTIAL: CPT

## 2024-09-26 DEVICE — LEFT ATRIAL APPENDAGE CLOSURE DEVICE WITH DELIVERY SYSTEM
Type: IMPLANTABLE DEVICE | Status: FUNCTIONAL
Brand: WATCHMAN FLX™ PRO

## 2024-09-26 RX ORDER — METOPROLOL TARTRATE 50 MG
50 TABLET ORAL 2 TIMES DAILY
Status: DISCONTINUED | OUTPATIENT
Start: 2024-09-26 | End: 2024-09-27 | Stop reason: HOSPADM

## 2024-09-26 RX ORDER — ASPIRIN 325 MG
325 TABLET ORAL ONCE
Status: COMPLETED | OUTPATIENT
Start: 2024-09-26 | End: 2024-09-26

## 2024-09-26 RX ORDER — SODIUM CHLORIDE 0.9 % (FLUSH) 0.9 %
5-40 SYRINGE (ML) INJECTION EVERY 12 HOURS SCHEDULED
Status: DISCONTINUED | OUTPATIENT
Start: 2024-09-26 | End: 2024-09-27 | Stop reason: HOSPADM

## 2024-09-26 RX ORDER — HEPARIN SODIUM 1000 [USP'U]/ML
INJECTION, SOLUTION INTRAVENOUS; SUBCUTANEOUS PRN
Status: DISCONTINUED | OUTPATIENT
Start: 2024-09-26 | End: 2024-09-26 | Stop reason: HOSPADM

## 2024-09-26 RX ORDER — CLOPIDOGREL BISULFATE 75 MG/1
75 TABLET ORAL DAILY
Status: DISCONTINUED | OUTPATIENT
Start: 2024-09-26 | End: 2024-09-27 | Stop reason: HOSPADM

## 2024-09-26 RX ORDER — SODIUM CHLORIDE 9 MG/ML
INJECTION, SOLUTION INTRAVENOUS PRN
Status: DISCONTINUED | OUTPATIENT
Start: 2024-09-26 | End: 2024-09-26 | Stop reason: SDUPTHER

## 2024-09-26 RX ORDER — SODIUM CHLORIDE 0.9 % (FLUSH) 0.9 %
5-40 SYRINGE (ML) INJECTION PRN
Status: DISCONTINUED | OUTPATIENT
Start: 2024-09-26 | End: 2024-09-26 | Stop reason: SDUPTHER

## 2024-09-26 RX ORDER — MIDAZOLAM HYDROCHLORIDE 1 MG/ML
INJECTION INTRAMUSCULAR; INTRAVENOUS PRN
Status: DISCONTINUED | OUTPATIENT
Start: 2024-09-26 | End: 2024-09-26 | Stop reason: HOSPADM

## 2024-09-26 RX ORDER — VITAMIN B COMPLEX
1000 TABLET ORAL DAILY
Status: DISCONTINUED | OUTPATIENT
Start: 2024-09-26 | End: 2024-09-27 | Stop reason: HOSPADM

## 2024-09-26 RX ORDER — ASPIRIN 81 MG/1
81 TABLET ORAL DAILY
Status: DISCONTINUED | OUTPATIENT
Start: 2024-09-27 | End: 2024-09-27 | Stop reason: HOSPADM

## 2024-09-26 RX ORDER — LIDOCAINE HYDROCHLORIDE 10 MG/ML
INJECTION, SOLUTION INTRAVENOUS PRN
Status: DISCONTINUED | OUTPATIENT
Start: 2024-09-26 | End: 2024-09-26 | Stop reason: HOSPADM

## 2024-09-26 RX ORDER — BUMETANIDE 1 MG/1
1 TABLET ORAL DAILY
Status: DISCONTINUED | OUTPATIENT
Start: 2024-09-26 | End: 2024-09-27 | Stop reason: HOSPADM

## 2024-09-26 RX ORDER — SODIUM CHLORIDE 0.9 % (FLUSH) 0.9 %
5-40 SYRINGE (ML) INJECTION PRN
Status: DISCONTINUED | OUTPATIENT
Start: 2024-09-26 | End: 2024-09-27 | Stop reason: HOSPADM

## 2024-09-26 RX ORDER — PROTAMINE SULFATE 10 MG/ML
INJECTION, SOLUTION INTRAVENOUS PRN
Status: DISCONTINUED | OUTPATIENT
Start: 2024-09-26 | End: 2024-09-26 | Stop reason: HOSPADM

## 2024-09-26 RX ORDER — CLOPIDOGREL BISULFATE 75 MG/1
75 TABLET ORAL DAILY
Status: DISCONTINUED | OUTPATIENT
Start: 2024-09-26 | End: 2024-09-26 | Stop reason: SDUPTHER

## 2024-09-26 RX ORDER — SODIUM CHLORIDE 9 MG/ML
INJECTION, SOLUTION INTRAVENOUS CONTINUOUS
Status: DISCONTINUED | OUTPATIENT
Start: 2024-09-26 | End: 2024-09-26 | Stop reason: SDUPTHER

## 2024-09-26 RX ORDER — SODIUM CHLORIDE 9 MG/ML
INJECTION, SOLUTION INTRAVENOUS CONTINUOUS
Status: DISCONTINUED | OUTPATIENT
Start: 2024-09-26 | End: 2024-09-27 | Stop reason: HOSPADM

## 2024-09-26 RX ORDER — SODIUM CHLORIDE 0.9 % (FLUSH) 0.9 %
5-40 SYRINGE (ML) INJECTION EVERY 12 HOURS SCHEDULED
Status: DISCONTINUED | OUTPATIENT
Start: 2024-09-26 | End: 2024-09-26 | Stop reason: SDUPTHER

## 2024-09-26 RX ORDER — POTASSIUM CHLORIDE 1500 MG/1
10 TABLET, EXTENDED RELEASE ORAL DAILY
Status: DISCONTINUED | OUTPATIENT
Start: 2024-09-26 | End: 2024-09-27 | Stop reason: HOSPADM

## 2024-09-26 RX ORDER — ACETAMINOPHEN 325 MG/1
650 TABLET ORAL EVERY 4 HOURS PRN
Status: DISCONTINUED | OUTPATIENT
Start: 2024-09-26 | End: 2024-09-27 | Stop reason: HOSPADM

## 2024-09-26 RX ORDER — ASPIRIN 81 MG/1
81 TABLET, CHEWABLE ORAL DAILY
Status: DISCONTINUED | OUTPATIENT
Start: 2024-09-26 | End: 2024-09-26 | Stop reason: SDUPTHER

## 2024-09-26 RX ORDER — FENTANYL CITRATE 50 UG/ML
INJECTION, SOLUTION INTRAMUSCULAR; INTRAVENOUS PRN
Status: DISCONTINUED | OUTPATIENT
Start: 2024-09-26 | End: 2024-09-26 | Stop reason: HOSPADM

## 2024-09-26 RX ORDER — SODIUM CHLORIDE 9 MG/ML
INJECTION, SOLUTION INTRAVENOUS PRN
Status: DISCONTINUED | OUTPATIENT
Start: 2024-09-26 | End: 2024-09-27 | Stop reason: HOSPADM

## 2024-09-26 RX ORDER — FAMOTIDINE 20 MG/1
20 TABLET, FILM COATED ORAL DAILY
Status: DISCONTINUED | OUTPATIENT
Start: 2024-09-27 | End: 2024-09-27 | Stop reason: HOSPADM

## 2024-09-26 RX ORDER — OXYCODONE AND ACETAMINOPHEN 5; 325 MG/1; MG/1
1 TABLET ORAL EVERY 4 HOURS PRN
Status: DISCONTINUED | OUTPATIENT
Start: 2024-09-26 | End: 2024-09-27 | Stop reason: HOSPADM

## 2024-09-26 RX ORDER — CLOPIDOGREL 300 MG/1
600 TABLET, FILM COATED ORAL ONCE
Status: COMPLETED | OUTPATIENT
Start: 2024-09-26 | End: 2024-09-26

## 2024-09-26 RX ORDER — ANTIOX #8/OM3/DHA/EPA/LUT/ZEAX 250-2.5 MG
1 CAPSULE ORAL 2 TIMES DAILY
Status: DISCONTINUED | OUTPATIENT
Start: 2024-09-26 | End: 2024-09-27 | Stop reason: HOSPADM

## 2024-09-26 RX ORDER — FERROUS SULFATE 325(65) MG
325 TABLET ORAL DAILY
Status: DISCONTINUED | OUTPATIENT
Start: 2024-09-26 | End: 2024-09-26

## 2024-09-26 RX ORDER — CHLORHEXIDINE GLUCONATE 40 MG/ML
SOLUTION TOPICAL ONCE
Status: DISCONTINUED | OUTPATIENT
Start: 2024-09-26 | End: 2024-09-27 | Stop reason: HOSPADM

## 2024-09-26 RX ADMIN — SODIUM CHLORIDE: 9 INJECTION, SOLUTION INTRAVENOUS at 08:14

## 2024-09-26 RX ADMIN — ASPIRIN 325 MG: 325 TABLET ORAL at 16:28

## 2024-09-26 RX ADMIN — METOPROLOL TARTRATE 50 MG: 50 TABLET, FILM COATED ORAL at 20:55

## 2024-09-26 RX ADMIN — Medication 1000 UNITS: at 20:56

## 2024-09-26 RX ADMIN — POTASSIUM CHLORIDE 10 MEQ: 20 TABLET, EXTENDED RELEASE ORAL at 20:57

## 2024-09-26 RX ADMIN — Medication 1 CAPSULE: at 22:09

## 2024-09-26 RX ADMIN — CLOPIDOGREL BISULFATE 600 MG: 300 TABLET, FILM COATED ORAL at 16:29

## 2024-09-26 RX ADMIN — VANCOMYCIN HYDROCHLORIDE 1000 MG: 1 INJECTION, POWDER, LYOPHILIZED, FOR SOLUTION INTRAVENOUS at 10:37

## 2024-09-26 NOTE — PROGRESS NOTES
Structural Heart Progress Note                         Patient Identification:  Lulu Werner  : 1935  MRN: [id number]   Account: [id number]     Admit date: 2024    Attending provider: Dakotah Lux MD        Primary care provider: Yayo Wren APRN - CNP     Admission Diagnoses:  Atrial Fibrillation not able to tolerate long term OAC    Procedural Diagnoses:   S/P WATCHMAN 2024    Chief complaint: \"I think I will stay tonight - just want to make sure I am steady on my feet and that my leg is not going to bleed - I will feel more comfortable being watched by the nurses tonigt.     Early Discharge Criteria: NOT MET  [x]Uncomplicated intubation/extubation or use of MAC during the case  [x]Ultrasound guided and uncomplicated vascular access and closure  [x]Uncomplicated septal puncture  [x]Hemodynamically stable without use of any pressor supporting agents after the procedure  []No pericardial effusion during case - small pericardial effusion - stable on repeat echo  []Has a willingness to go home with a caregiver   [x]Patient has transportation for the next morning structural heart follow up appointment or case of urgent return overnight  [x]No need for supplemental O2 or at patients normal baseline level of O2 use  [x]No need for a blood transfusion during stay  [x]Able to tolerate PO intake    [x]Able to ambulate at baseline 6 hours after the procedure  [x]Tolerating DAPT post procedure and has medications for home use  [x]No cardiac symptoms at the time of discharge    Pre-procedure Diagnosis: Paroxysmal Atrial Fibrillation    Procedures: Percutaneous Left Atrial Appendage Occlusion implant with the use of nursing anesthesia. A WATCHMAN FLX device was utilized.     Code Status:   FULL CODE    Examination:  Vitals:BP (!) 95/54   Pulse 96   Temp 97.8 °F (36.6 °C) (Oral)   Resp 17   Ht 1.626 m (5' 4\")   Wt 61.2 kg (134 lb 14.7 oz)   SpO2 97%   Breastfeeding No   BMI 23.16 kg/m²

## 2024-09-26 NOTE — PROGRESS NOTES
Patient admitted to 2E08  via wheelchair for Watchman.  Patient NPO. Patient accompanied by family.  Vital signs obtained.   Assessment and data collection intiated.   Oriented to room.  Policies and procedures for 2E explained.   All questions answered with no further questions at this time.   Fall prevention and safety precautions discussed with patient.

## 2024-09-26 NOTE — FLOWSHEET NOTE
Pt found sitting up in bed. Pt reminded to lay back down until 1700. Family at bedside. Pt wanting to eat and drink. Anne Mortensen notified and order was to hold off on food and drink for now. Right groin stable.

## 2024-09-26 NOTE — H&P
[facility]  Sedation/Analgesia History & Physical    Pt Name: Lulu Werner  Account number: [id number]  MRN: [id number]  YOB: 1935  Provider Performing Procedure: Dakotah Lux MD MD  Referring Provider: Dakotah Lux MD   Primary Care Physician: Yayo Wren, PATRICIA - CNP  Date: 9/26/2024    PRE-PROCEDURE    Code Status: FULL CODE  Brief History/Pre-Procedure Diagnosis:   PAF, [id number] = 6     Consent: : I have discussed with the patient risks, benefits, and alternatives (and relevant risks, benefits, and side effects related to alternatives or not receiving care), and likelihood of the success.   The patient and/or representative appear to understand and agree to proceed.  The discussion encompasses risks, benefits, and side effects related to the alternatives and the risks related to not receiving the proposed care, treatment, and services.     The indication, risks and benefits of the procedure and possible therapeutic consequences and alternatives were discussed with the patient. The patient was given the opportunity to ask questions and to have them answered to his/her satisfaction. Risks of the procedure include but are not limited to the following: Bleeding, hematoma including retroperitoneal hemmorhage, infection, pain and discomfort, injury to the aorta and other blood vessels, rhythm disturbance, low blood pressure, myocardial infarction, stroke, kidney damage/failure, myocardial perforation, allergic reactions to sedatives/contrast material, loss of pulse/vascular compromise requiring surgery, aneurysm/pseudoaneurysm formation, possible loss of a limb/hand/leg, needing blood transfusion, requiring emergent open heart surgery or emergent coronary intervention, the need for intubation/respiratory support, the requirement for defibrillation/cardioversion, and death. Alternatives to and omission of the suggested procedure were discussed. The patient had no further

## 2024-09-26 NOTE — BRIEF OP NOTE
[facility]  Sedation/Analgesia Post Sedation Record    Pt Name: Lulu Werner  Account number: [id number]  MRN: [id number]  YOB: 1935  Procedure Performed By: Dakotah Lux MD MD FACC, FSCAI, RPVI  Primary Care Physician: Yayo Wren, APRN - CNP  Date: 9/26/2024    POST-PROCEDURE    Physicians/Assistants: Dakotah Lux MD, DREW, [facility], RPVI    Procedure Performed: WATCHMAN    Sedation/Anesthesia: Versed/ Fentanyl and 2% xylocaine local anesthesia.      Estimated Blood Loss: < 50 ml.     Specimens Removed: None         Disposition of Specimen: N/A        Complications: No Immediate Complications.       Post-procedure Diagnosis/Findings:       WFLX 27 mm      Electronically signed by Dakotah Lux MD on 9/26/24 at 12:43 PM EDT   Interventional Cardiology

## 2024-09-26 NOTE — PLAN OF CARE
Problem: Chronic Conditions and Co-morbidities  Goal: Patient's chronic conditions and co-morbidity symptoms are monitored and maintained or improved  Outcome: Adequate for Discharge     Problem: ABCDS Injury Assessment  Goal: Absence of physical injury  Outcome: Adequate for Discharge

## 2024-09-26 NOTE — CARE COORDINATION
Case Management Assessment Initial Evaluation    Date/Time of Evaluation: 9/26/2024 1:02 PM  Assessment Completed by: Eduarda Trujillo RN    If patient is discharged prior to next notation, then this note serves as note for discharge by case management.    Patient Name: Lulu Werner                   YOB: 1935  Diagnosis: Paroxysmal A-fib (HCC) [I48.0]                   Date / Time: 9/26/2024  6:51 AM  Location: Cath Pool Room/     Patient Admission Status: Inpatient   Readmission Risk Low 0-14, Mod 15-19), High > 20: Readmission Risk Score: 9.5    Current PCP: Yayo Wren, PATRICIA - CNP  Health Care Decision Makers:     Additional Case Management Notes: Here for elective procedure. Pt is scheduled for Watchman procedure today. If pt meets discharge criteria, will plan discharge later today.     Procedure:   9/26 Watchman procedure with Dr. Lux.     Patient Goals/Plan/Treatment Preferences: Spoke with son, pt at procedure. Pt lives alone, family supportive. Denies needs.        09/26/24 1259   Service Assessment   Patient Orientation Unable to Assess  (pt at procedure)   Cognition Other (see comment)  (pt at procedure)   History Provided By Child/Family   Primary Caregiver Self   Accompanied By/Relationship son   Support Systems Children;Family Members   Patient's Healthcare Decision Maker is: Legal Next of Kin   PCP Verified by CM Yes   Last Visit to PCP Within last 3 months   Prior Functional Level Independent in ADLs/IADLs   Current Functional Level Independent in ADLs/IADLs   Can patient return to prior living arrangement Yes   Ability to make needs known: Good   Family able to assist with home care needs: Yes   Would you like for me to discuss the discharge plan with any other family members/significant others, and if so, who? Yes  (son)   Financial Resources Medicare   Community Resources None   Social/Functional History   Active  Yes  (family drives to appts)   Discharge Planning

## 2024-09-26 NOTE — PROGRESS NOTES
Pharmacy Renal Adjustment    Pharmacy renally adjusted the following medication(s) per P&T approved policy: famotidine    Recent Labs     09/24/24  0934 09/26/24  0715   BUN  --  23*   CREATININE 1.26 1.1     Estimated Creatinine Clearance: 31 mL/min (based on SCr of 1.1 mg/dL).    Assessment:  Normal    Plan:   Decrease famotidine 20 mg from 2 times daily to daily    Please call pharmacy with any questions.    Shilpa Gomez [facility]  9/26/2024  6:23 PM

## 2024-09-27 VITALS
TEMPERATURE: 97.7 F | HEIGHT: 64 IN | HEART RATE: 86 BPM | SYSTOLIC BLOOD PRESSURE: 111 MMHG | RESPIRATION RATE: 18 BRPM | OXYGEN SATURATION: 98 % | BODY MASS INDEX: 23.39 KG/M2 | DIASTOLIC BLOOD PRESSURE: 81 MMHG | WEIGHT: 137 LBS

## 2024-09-27 PROCEDURE — 2580000003 HC RX 258: Performed by: INTERNAL MEDICINE

## 2024-09-27 PROCEDURE — 6370000000 HC RX 637 (ALT 250 FOR IP): Performed by: INTERNAL MEDICINE

## 2024-09-27 RX ADMIN — SODIUM CHLORIDE, PRESERVATIVE FREE 10 ML: 5 INJECTION INTRAVENOUS at 09:32

## 2024-09-27 RX ADMIN — METOPROLOL TARTRATE 50 MG: 50 TABLET, FILM COATED ORAL at 09:30

## 2024-09-27 RX ADMIN — POTASSIUM CHLORIDE 10 MEQ: 20 TABLET, EXTENDED RELEASE ORAL at 09:31

## 2024-09-27 RX ADMIN — Medication 1 CAPSULE: at 09:31

## 2024-09-27 RX ADMIN — FAMOTIDINE 20 MG: 20 TABLET, FILM COATED ORAL at 09:29

## 2024-09-27 RX ADMIN — CLOPIDOGREL BISULFATE 75 MG: 75 TABLET, FILM COATED ORAL at 09:28

## 2024-09-27 RX ADMIN — Medication 1000 UNITS: at 09:32

## 2024-09-27 RX ADMIN — ASPIRIN 81 MG: 81 TABLET, COATED ORAL at 09:27

## 2024-09-27 NOTE — PLAN OF CARE
Problem: Discharge Planning  Goal: Discharge to home or other facility with appropriate resources  9/27/2024 0933 by Stephen Rogers RN  Outcome: Progressing  9/27/2024 0240 by Adria Corral RN  Outcome: Progressing  Flowsheets (Taken 9/27/2024 0240)  Discharge to home or other facility with appropriate resources:   Identify barriers to discharge with patient and caregiver   Identify discharge learning needs (meds, wound care, etc)     Problem: Cardiovascular - Adult  Goal: Maintains optimal cardiac output and hemodynamic stability  9/27/2024 0933 by Stephen Rogers RN  Outcome: Progressing  9/27/2024 0240 by Adria Corral RN  Outcome: Progressing  Flowsheets (Taken 9/27/2024 0240)  Maintains optimal cardiac output and hemodynamic stability:   Monitor blood pressure and heart rate   Monitor urine output and notify Licensed Independent Practitioner for values outside of normal range   Assess for signs of decreased cardiac output  Goal: Absence of cardiac dysrhythmias or at baseline  9/27/2024 0933 by Stephen Rogers RN  Outcome: Progressing  9/27/2024 0240 by Adria Corral RN  Outcome: Progressing  Flowsheets (Taken 9/27/2024 0240)  Absence of cardiac dysrhythmias or at baseline:   Monitor cardiac rate and rhythm   Assess for signs of decreased cardiac output     Problem: Safety - Adult  Goal: Free from fall injury  9/27/2024 0933 by Stephen Rogers RN  Outcome: Progressing  9/27/2024 0240 by Adria Corral RN  Outcome: Progressing  Note: Bed locked & in low position, call light in reach, side-rails up x2, bed/chair alarm utilized, non-slip socks on when ambulating, reminded patient to use call light to call for assistance.

## 2024-09-27 NOTE — DISCHARGE INSTRUCTIONS
Post WATHCMAN Discharge Instructions  We are here for you! If you have any questions please call: Structural Heart Team [phone]    Do you have the help you need at home? It is our REQUIREMENT to have 24 hour care for at least the first 24 hours post WATCHMAN implant.                              ACTIVITY:  NO DRIVING for 48 hours following procedure.  You may ride in a car.   You may return to work after 5 days following the Watchman Procedure.  No flights of stairs or no heavy lifting for the seven days after the procedure. Then proceed to normal activities as prior to the procedure.   Do not lift more than five to ten pounds for the first week you are home.  (A gallon of milk is 7 lbs)  Get up and get dressed every day. Avoid wearing tight or restrictive clothing. Do not stay in bed. Set a daily routine. This is an important step in re-gaining your strength.  Walk as much as you can.  This will help facilitate the healing process.  Plan rest periods during the day.  If possible avoid strenuous or vigorous activities and exercise if possible (I.e. climbing stairs)  Avoid work that increases muscle tension.        (straining with bowel movements, moving furniture, etc.)  -  While coughing, sneezing or during bowel movement, support the puncture site by applying gentle compression with the palm of your hand    WOUND CARE:  You may shower 24 hours post procedure. Shower every day. No bathtubs, pools, or hot tubs for the first week you are home.   Cleanse wound with mild soap and water.  Reapply a clean bandaid on the puncture site daily times 5 days or until site is healed.  Keep wound dry.   A small amount of bloody or clear drainage is normal.   Watch for signs of infections: redness, incision hot to the touch, fever greater than 101 degrees, swelling at the groin or incision site, or discolored drainage from your incision.     NORMAL OBSERVATIONS:  - Slight bump or groin tenderness, which may last up to one

## 2024-09-27 NOTE — PLAN OF CARE
Problem: Discharge Planning  Goal: Discharge to home or other facility with appropriate resources  Outcome: Progressing  Flowsheets (Taken 9/27/2024 0240)  Discharge to home or other facility with appropriate resources:   Identify barriers to discharge with patient and caregiver   Identify discharge learning needs (meds, wound care, etc)     Problem: Cardiovascular - Adult  Goal: Maintains optimal cardiac output and hemodynamic stability  Outcome: Progressing  Flowsheets (Taken 9/27/2024 0240)  Maintains optimal cardiac output and hemodynamic stability:   Monitor blood pressure and heart rate   Monitor urine output and notify Licensed Independent Practitioner for values outside of normal range   Assess for signs of decreased cardiac output     Problem: Cardiovascular - Adult  Goal: Absence of cardiac dysrhythmias or at baseline  Outcome: Progressing  Flowsheets (Taken 9/27/2024 0240)  Absence of cardiac dysrhythmias or at baseline:   Monitor cardiac rate and rhythm   Assess for signs of decreased cardiac output     Problem: Safety - Adult  Goal: Free from fall injury  Outcome: Progressing  Note: Bed locked & in low position, call light in reach, side-rails up x2, bed/chair alarm utilized, non-slip socks on when ambulating, reminded patient to use call light to call for assistance.    Care plan reviewed with patient.  Patient verbalizes understanding of the care plan and contributed to goal setting.

## 2024-09-27 NOTE — PROGRESS NOTES
PIV removed, no s/s bleeding noted. Discharge instructions and medications reviewed, questions asked and answered. Pt discharged to home accompanied by sons. NAD noted at time of d/c.

## 2024-09-28 ENCOUNTER — TELEPHONE (OUTPATIENT)
Dept: CARDIOLOGY CLINIC | Age: 89
End: 2024-09-28

## 2024-09-28 NOTE — TELEPHONE ENCOUNTER
Post WATCHMAN (same day discharge) follow up call:    The WATCHMAN was implanted on 9/26 by Dr. Lux and met all criteria to be discharged the same day.  A follow up phone call was made and patient states that she is doing well. No complaints of pain, hematoma, bruising, oozing, numbness or tingling. Patient was discharged home on 9/27 and has been instructed to take Plavix and Aspirin starting today. The below education was reviewed and the 45 day JESSICA is set for 11/8 @ 0730.       Education was given regarding:  Walk as much as you can.  This will help facilitate the healing process.  Cleanse wound with mild soap and water.  Keep wound dry.   A small amount of bloody or clear drainage is normal.   Watch for signs of infections: redness, incision hot to the touch, fever greater than 101 degrees, swelling at the groin or incision site, or discolored drainage from your incision.   DO NOT STOP TAKING ANY MEDICATION OR YOUR PRESCRIBED ANTICOAGULATION WITHOUT SPEAKING WITH YOUR CARDIOLOGIST!    Take your pills as ordered at time of discharge.   Continue all anticoagulation as instructed at discharge.    Call with any signs of bleeding.  Antibiotic coverage will be needed post WATCHMAN for 6 months post implant if the following is needed: dental procedures including cleanings, gastrointestinal (GI) or genitourinary (), respiratory procedures and procedures on infected skin or muscle    What symptoms or health problems do you need to look out for after you leave the hospital? Call if you have any of these symptoms ([phone]):  Weight pound gain of 3 pounds in one day or 5 pounds in one week. Weight gain is NOT normal.    Chest pain or discomfort  Swelling of the legs, ankles or feet  Increasing shortness of breath  Change in the color or temperature of your lower legs and feet  Develop abdominal pain or unrelieved nausea and/or vomiting  Develop any redness, incision, or limited movement of your arms or legs  Signs

## 2024-09-30 NOTE — TELEPHONE ENCOUNTER
Moved patient 45 day JESSICA to 11/8 @ 1 pm arrival time of 1130 am. Spoke with the patient to notify of time change. Also left voicemail with son Matthew to notify of time change and her need of a

## 2024-10-03 RX ORDER — IOPAMIDOL 755 MG/ML
INJECTION, SOLUTION INTRAVASCULAR PRN
Status: DISCONTINUED | OUTPATIENT
Start: 2024-09-26 | End: 2024-10-03 | Stop reason: HOSPADM

## 2024-11-07 RX ORDER — SODIUM CHLORIDE 9 MG/ML
INJECTION, SOLUTION INTRAVENOUS CONTINUOUS
Status: CANCELLED | OUTPATIENT
Start: 2024-11-07

## 2024-11-08 ENCOUNTER — ANESTHESIA (OUTPATIENT)
Dept: ENDOSCOPY | Age: 89
End: 2024-11-08
Payer: MEDICARE

## 2024-11-08 ENCOUNTER — HOSPITAL ENCOUNTER (OUTPATIENT)
Age: 89
Setting detail: OUTPATIENT SURGERY
Discharge: HOME | End: 2024-11-08
Attending: INTERNAL MEDICINE | Admitting: INTERNAL MEDICINE
Payer: MEDICARE

## 2024-11-08 ENCOUNTER — ANESTHESIA EVENT (OUTPATIENT)
Dept: ENDOSCOPY | Age: 89
End: 2024-11-08
Payer: MEDICARE

## 2024-11-08 ENCOUNTER — APPOINTMENT (OUTPATIENT)
Age: 89
End: 2024-11-08
Attending: INTERNAL MEDICINE
Payer: MEDICARE

## 2024-11-08 VITALS
TEMPERATURE: 97.5 F | SYSTOLIC BLOOD PRESSURE: 133 MMHG | OXYGEN SATURATION: 97 % | DIASTOLIC BLOOD PRESSURE: 77 MMHG | WEIGHT: 134 LBS | BODY MASS INDEX: 22.33 KG/M2 | RESPIRATION RATE: 18 BRPM | HEART RATE: 81 BPM | HEIGHT: 65 IN

## 2024-11-08 DIAGNOSIS — I48.0 PAROXYSMAL ATRIAL FIBRILLATION (HCC): ICD-10-CM

## 2024-11-08 LAB
ECHO BSA: 1.67 M2
ECHO LV EF PHYSICIAN: 60 %

## 2024-11-08 PROCEDURE — 2580000003 HC RX 258: Performed by: NURSE ANESTHETIST, CERTIFIED REGISTERED

## 2024-11-08 PROCEDURE — 93325 DOPPLER ECHO COLOR FLOW MAPG: CPT

## 2024-11-08 PROCEDURE — 6360000002 HC RX W HCPCS: Performed by: NURSE ANESTHETIST, CERTIFIED REGISTERED

## 2024-11-08 PROCEDURE — 2500000003 HC RX 250 WO HCPCS: Performed by: NURSE ANESTHETIST, CERTIFIED REGISTERED

## 2024-11-08 RX ORDER — SODIUM CHLORIDE 9 MG/ML
INJECTION, SOLUTION INTRAVENOUS
Status: DISCONTINUED | OUTPATIENT
Start: 2024-11-08 | End: 2024-11-08 | Stop reason: SDUPTHER

## 2024-11-08 RX ORDER — LIDOCAINE HYDROCHLORIDE 20 MG/ML
INJECTION, SOLUTION EPIDURAL; INFILTRATION; INTRACAUDAL; PERINEURAL
Status: DISCONTINUED | OUTPATIENT
Start: 2024-11-08 | End: 2024-11-08 | Stop reason: SDUPTHER

## 2024-11-08 RX ORDER — PROPOFOL 10 MG/ML
INJECTION, EMULSION INTRAVENOUS
Status: DISCONTINUED | OUTPATIENT
Start: 2024-11-08 | End: 2024-11-08 | Stop reason: SDUPTHER

## 2024-11-08 RX ADMIN — PROPOFOL 20 MG: 10 INJECTION, EMULSION INTRAVENOUS at 13:31

## 2024-11-08 RX ADMIN — PROPOFOL 25 MG: 10 INJECTION, EMULSION INTRAVENOUS at 13:24

## 2024-11-08 RX ADMIN — PROPOFOL 50 MG: 10 INJECTION, EMULSION INTRAVENOUS at 13:17

## 2024-11-08 RX ADMIN — SODIUM CHLORIDE: 9 INJECTION, SOLUTION INTRAVENOUS at 13:01

## 2024-11-08 RX ADMIN — PROPOFOL 20 MG: 10 INJECTION, EMULSION INTRAVENOUS at 13:29

## 2024-11-08 RX ADMIN — LIDOCAINE HYDROCHLORIDE 100 MG: 20 INJECTION, SOLUTION EPIDURAL; INFILTRATION; INTRACAUDAL; PERINEURAL at 13:17

## 2024-11-08 ASSESSMENT — PAIN - FUNCTIONAL ASSESSMENT
PAIN_FUNCTIONAL_ASSESSMENT: NONE - DENIES PAIN

## 2024-11-08 NOTE — PROGRESS NOTES
Brought to phase 2  VSS  Denies any pain  D/c instructions reinforced with patient and Nba gould son

## 2024-11-08 NOTE — ANESTHESIA PRE PROCEDURE
Department of Anesthesiology  Preprocedure Note       Name:  Lulu Werner   Age:  89 y.o.  :  1935                                          MRN:  [id number]         Date:  2024      Surgeon: Surgeon(s):  Prabhakar Huston MD    Procedure: Procedure(s):  TRANSESOPHAGEAL ECHOCARDIOGRAM    Medications prior to admission:   Prior to Admission medications    Medication Sig Start Date End Date Taking? Authorizing Provider   vitamin D (CHOLECALCIFEROL) 25 MCG (1000 UT) TABS tablet Take 1 tablet by mouth daily    Nirav Rios MD   clopidogrel (PLAVIX) 75 MG tablet Take 1 tablet by mouth daily 24   Dakotah Lux MD   aspirin 81 MG EC tablet Take 1 tablet by mouth daily 24   Dakotah Lux MD   dilTIAZem (CARDIZEM CD) 120 MG extended release capsule TAKE 1 CAPSULE BY MOUTH EVERY DAY 24   Prabhakar Huston MD   XALATAN 0.005 % ophthalmic solution Apply 1 drop to eye daily 24  Nirav Rios MD   famotidine (PEPCID) 20 MG tablet TAKE 1 TABLET BY MOUTH TWICE  DAILY 24   Trang Atwood APRN - CNP   ferrous sulfate (IRON 325) 325 (65 Fe) MG tablet Take 1 tablet by mouth daily Taking 2 tablets  Patient not taking: Reported on 23   Nirav Rios MD   potassium chloride (KLOR-CON M) 10 MEQ extended release tablet Take 1 tablet by mouth daily 23   Monique Rdz APRN - CNP   metoprolol tartrate (LOPRESSOR) 50 MG tablet Take 1 tablet by mouth 2 times daily 23   Monique Rdz APRN - CNP   bumetanide (BUMEX) 1 MG tablet Take 1 tablet by mouth daily 23   Monique Rdz APRN - CNP   multivitamin-iron-minerals-folic acid (CENTRUM) chewable tablet Take by mouth daily    Nirav Rios MD   Multiple Vitamins-Minerals (PRESERVISION AREDS 2 PO) Take 1 tablet by mouth in the morning and at bedtime    Nirav Rios MD   Omega-3 Fatty Acids (FISH OIL) 1000 MG capsule Take by mouth daily    Blanca

## 2024-11-08 NOTE — ANESTHESIA POSTPROCEDURE EVALUATION
Department of Anesthesiology  Postprocedure Note    Patient: Lulu Werner  MRN: [id number]  YOB: 1935  Date of evaluation: 11/8/2024    Procedure Summary       Date: 11/08/24 Room / Location: [place] [address] / [facility]    Anesthesia Start: 1312 Anesthesia Stop: 1336    Procedure: TRANSESOPHAGEAL ECHOCARDIOGRAM Diagnosis:       Paroxysmal A-fib (HCC)      (Paroxysmal A-fib (HCC) [I48.0])    Surgeons: Prabhakar Huston MD Responsible Provider: Marcel Lux MD    Anesthesia Type: MAC ASA Status: 3            Anesthesia Type: No value filed.    Derek Phase I: Derek Score: 10    Derek Phase II: Derek Score: 10    Anesthesia Post Evaluation    Patient location during evaluation: bedside  Patient participation: complete - patient participated  Level of consciousness: awake  Pain score: 0  Airway patency: patent  Nausea & Vomiting: no nausea and no vomiting  Cardiovascular status: blood pressure returned to baseline  Respiratory status: acceptable  Hydration status: stable  Pain management: adequate        No notable events documented.

## 2024-11-08 NOTE — PROGRESS NOTES
admitted to Endo department and admitted to Endo room 9  Plan of care reviewed with patient.   Call light within reach.   Allergies reviewed with  Patient/ family  Bed in lowest position, locked, with one bed rail up.   Appropriate arm bands on patient.   Bathroom offered.   All questions and concerns of patient addressed

## 2024-11-11 ENCOUNTER — TELEPHONE (OUTPATIENT)
Dept: CARDIOLOGY CLINIC | Age: 89
End: 2024-11-11

## 2024-11-11 NOTE — TELEPHONE ENCOUNTER
This patient has completed the 45 day post WATCHMAN JESSICA on 11/8. The patient will remain on DAPT per structural heart protocol. Dr. Lux do you agree? Please advise there is a leak noted.

## 2024-11-13 RX ORDER — CLOPIDOGREL BISULFATE 75 MG/1
75 TABLET ORAL DAILY
Qty: 90 TABLET | Refills: 0 | Status: CANCELLED | OUTPATIENT
Start: 2024-11-13

## 2024-11-19 RX ORDER — CLOPIDOGREL BISULFATE 75 MG/1
75 TABLET ORAL DAILY
Qty: 90 TABLET | Refills: 1 | Status: SHIPPED | OUTPATIENT
Start: 2024-11-19

## 2024-12-10 RX ORDER — POTASSIUM CHLORIDE 750 MG/1
10 TABLET, EXTENDED RELEASE ORAL DAILY
Qty: 90 TABLET | Refills: 1 | Status: SHIPPED | OUTPATIENT
Start: 2024-12-10

## 2025-03-06 NOTE — PATIENT INSTRUCTIONS
You may receive a survey regarding the care you received during your visit.  Your input is valuable to us.  We encourage you to complete and return your survey.  We hope you will choose us in the future for your healthcare needs.  Thank you for choosing [facility]!     Your Medical Assistant Today:  Chris  Your Provider for Today: Dr. Huston  Ph. [phone] opt 3

## 2025-03-07 ENCOUNTER — OFFICE VISIT (OUTPATIENT)
Dept: CARDIOLOGY CLINIC | Age: 89
End: 2025-03-07
Payer: MEDICARE

## 2025-03-07 VITALS
HEART RATE: 74 BPM | BODY MASS INDEX: 26.86 KG/M2 | SYSTOLIC BLOOD PRESSURE: 133 MMHG | WEIGHT: 136.8 LBS | DIASTOLIC BLOOD PRESSURE: 94 MMHG | HEIGHT: 60 IN

## 2025-03-07 DIAGNOSIS — I48.0 PAF (PAROXYSMAL ATRIAL FIBRILLATION) (HCC): ICD-10-CM

## 2025-03-07 DIAGNOSIS — R06.02 SOB (SHORTNESS OF BREATH) ON EXERTION: ICD-10-CM

## 2025-03-07 DIAGNOSIS — R00.2 INTERMITTENT PALPITATIONS: ICD-10-CM

## 2025-03-07 DIAGNOSIS — R60.0 EDEMA OF LEFT LOWER EXTREMITY: ICD-10-CM

## 2025-03-07 DIAGNOSIS — I10 PRIMARY HYPERTENSION: ICD-10-CM

## 2025-03-07 DIAGNOSIS — I50.32 CHRONIC DIASTOLIC CONGESTIVE HEART FAILURE (HCC): Primary | ICD-10-CM

## 2025-03-07 DIAGNOSIS — N18.31 STAGE 3A CHRONIC KIDNEY DISEASE (HCC): ICD-10-CM

## 2025-03-07 DIAGNOSIS — R53.82 CHRONIC FATIGUE: ICD-10-CM

## 2025-03-07 PROCEDURE — G8427 DOCREV CUR MEDS BY ELIG CLIN: HCPCS | Performed by: INTERNAL MEDICINE

## 2025-03-07 PROCEDURE — 1090F PRES/ABSN URINE INCON ASSESS: CPT | Performed by: INTERNAL MEDICINE

## 2025-03-07 PROCEDURE — 99214 OFFICE O/P EST MOD 30 MIN: CPT | Performed by: INTERNAL MEDICINE

## 2025-03-07 PROCEDURE — G8419 CALC BMI OUT NRM PARAM NOF/U: HCPCS | Performed by: INTERNAL MEDICINE

## 2025-03-07 PROCEDURE — 1123F ACP DISCUSS/DSCN MKR DOCD: CPT | Performed by: INTERNAL MEDICINE

## 2025-03-07 PROCEDURE — 1036F TOBACCO NON-USER: CPT | Performed by: INTERNAL MEDICINE

## 2025-03-07 PROCEDURE — 1159F MED LIST DOCD IN RCRD: CPT | Performed by: INTERNAL MEDICINE

## 2025-03-07 PROCEDURE — 1160F RVW MEDS BY RX/DR IN RCRD: CPT | Performed by: INTERNAL MEDICINE

## 2025-03-07 NOTE — PROGRESS NOTES
Chief Complaint   Patient presents with    6 Month Follow-Up    Congestive Heart Failure    Check-Up   PROVIDER:     Prabhakar Huston M.D.     CONSULT DATE:  06/25/2023     REASON FOR CONSULTATION:  New-onset atrial fibrillation with rapid  ventricular response and CHF     The patient presents for a post-hospital follow-up. She was discharged on 06/27/2023.  Treated for ZNew CHF and new atr fib converted to NSR on its own  and sent homer on new meds  Apixaban, bumex and cardizem and cefedinir    Dec 13, 2023  \"Pat scheduled for JESSICA-CV from CHF clinic due to worsening of sob recently despite being well diuresed    Pre-procedure work up showed moderate  anemia and  hence procedure cancelled because OAC should be stopped    D/w the pat at length and she is unhappy that it has to be cancelled   Family by bedside and understanding the scenario\"  Pat sent to pcp and GI to address aneamia AND POSSIBLE gi BLEEDING  -    Hgb BETTER NOW FROM 8.9  DEC 2023 TO NOW 11 GM  P[AT STILL STATES NO IMRPROVEMENT IN HER SYMPTOMS          Pt here for a 6 month f/u    EKG done 9-26-24    Pt is wanting off Bumex and Plavix and d/w the pat the need and pat verbalized understanding    Sob on exertion    Leg edema trace   better LT > RT  No wt gain    Denied cp, dizziness     Fatigue chronic    Occasional palpitation    Do not feel doing anything    Patient was taken off the blood thinners due to low HGB and patient had an EGD done by Dr. Sims.  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

## 2025-03-13 DIAGNOSIS — K31.819 GAVE (GASTRIC ANTRAL VASCULAR ECTASIA): ICD-10-CM

## 2025-03-13 DIAGNOSIS — K21.9 GASTROESOPHAGEAL REFLUX DISEASE WITHOUT ESOPHAGITIS: ICD-10-CM

## 2025-03-13 DIAGNOSIS — K29.90 GASTRITIS AND DUODENITIS: ICD-10-CM

## 2025-03-13 RX ORDER — FAMOTIDINE 20 MG/1
20 TABLET, FILM COATED ORAL 2 TIMES DAILY
Qty: 180 TABLET | Refills: 3 | Status: CANCELLED | OUTPATIENT
Start: 2025-03-13

## 2025-03-13 RX ORDER — DILTIAZEM HYDROCHLORIDE 120 MG/1
120 CAPSULE, COATED, EXTENDED RELEASE ORAL DAILY
Qty: 90 CAPSULE | Refills: 1 | Status: SHIPPED | OUTPATIENT
Start: 2025-03-13

## 2025-03-13 NOTE — TELEPHONE ENCOUNTER
Lulu Werner called requesting a refill on the following medications:  Requested Prescriptions     Pending Prescriptions Disp Refills    famotidine (PEPCID) 20 MG tablet 180 tablet 3     Sig: Take 1 tablet by mouth 2 times daily    dilTIAZem (CARDIZEM CD) 120 MG extended release capsule 90 capsule 1     Sig: Take by mouth daily     Pharmacy verified:  [email or web address]  [facility] [address] [phone] - F [phone]     Date of last visit: 03/07/2025  Date of next visit (if applicable): 8/8/2025

## 2025-03-26 ENCOUNTER — TELEPHONE (OUTPATIENT)
Dept: CARDIOLOGY CLINIC | Age: 89
End: 2025-03-26

## 2025-03-26 DIAGNOSIS — I48.0 PAROXYSMAL ATRIAL FIBRILLATION (HCC): ICD-10-CM

## 2025-03-26 DIAGNOSIS — Z95.818 PRESENCE OF WATCHMAN LEFT ATRIAL APPENDAGE CLOSURE DEVICE: ICD-10-CM

## 2025-03-26 NOTE — TELEPHONE ENCOUNTER
This patient has completed 6 months of DAPT post WATCHMAN. According to the Watchman pharmacological guidelines it is recommended to stop Plavix and continue Aspirin 81 mg daily. After reviewing the patient's chart there is no further indication to continue Plavix. Dr. Lux are you ok with stopping Plavix and continuing Aspirin 81 mg daily?

## 2025-03-28 PROBLEM — Z95.818 PRESENCE OF WATCHMAN LEFT ATRIAL APPENDAGE CLOSURE DEVICE: Status: ACTIVE | Noted: 2025-03-26

## 2025-03-28 PROBLEM — I48.0 PAROXYSMAL ATRIAL FIBRILLATION (HCC): Status: ACTIVE | Noted: 2025-03-26

## 2025-03-28 NOTE — TELEPHONE ENCOUNTER
JESSICA scheduled 9/24/2025 at 2:00 pm, with Dr. Huston - with anesthesia. Arrival time of 12:30 pm. Spoke to Patt. Placed on provider's schedule. Order faxed.    Order faxed to [facility] for prior authorization.    Follow up with Monique scheduled 9/29/2025 at 1:30 pm.

## 2025-03-28 NOTE — TELEPHONE ENCOUNTER
Orders received from Dr. Lux to schedule the patient for a 1 year post Watchman follow up appointment with Anne Ferreira CNP along with a JESSICA, CBC and BMP prior.    JESSICA to be performed by their primary cardiologist. If Dr Lux is the primary cardiologist then with Dr Huston. JESSICA to be prior to appointment with Anne Ferreira CNP.     Watchman implanted on 9/26/24. JESSICA needs to be (305 to 425 days)    Betsy, would you be willing to schedule this?

## 2025-05-26 ENCOUNTER — HOSPITAL ENCOUNTER (INPATIENT)
Age: 89
LOS: 6 days | Discharge: NURSING, REHAB OR LONG-TERM CARE | DRG: 065 | End: 2025-06-03
Attending: STUDENT IN AN ORGANIZED HEALTH CARE EDUCATION/TRAINING PROGRAM
Payer: MEDICARE

## 2025-05-26 ENCOUNTER — APPOINTMENT (OUTPATIENT)
Dept: CT IMAGING | Age: 89
DRG: 065 | End: 2025-05-26
Payer: MEDICARE

## 2025-05-26 ENCOUNTER — APPOINTMENT (OUTPATIENT)
Dept: MRI IMAGING | Age: 89
DRG: 065 | End: 2025-05-26
Payer: MEDICARE

## 2025-05-26 DIAGNOSIS — H54.61 VISION LOSS OF RIGHT EYE: ICD-10-CM

## 2025-05-26 DIAGNOSIS — I63.232 CEREBROVASCULAR ACCIDENT (CVA) DUE TO OCCLUSION OF LEFT CAROTID ARTERY (HCC): ICD-10-CM

## 2025-05-26 DIAGNOSIS — I63.9 CEREBROVASCULAR ACCIDENT (CVA), UNSPECIFIED MECHANISM (HCC): ICD-10-CM

## 2025-05-26 DIAGNOSIS — R22.31 LOCALIZED SWELLING OF RIGHT FOREARM: ICD-10-CM

## 2025-05-26 DIAGNOSIS — I65.22 STENOSIS OF LEFT INTERNAL CAROTID ARTERY: Primary | ICD-10-CM

## 2025-05-26 PROBLEM — D32.9 MENINGIOMA (HCC): Status: ACTIVE | Noted: 2025-05-26

## 2025-05-26 PROBLEM — E87.6 HYPOKALEMIA: Status: ACTIVE | Noted: 2025-05-26

## 2025-05-26 PROBLEM — E55.9 VITAMIN D DEFICIENCY: Status: ACTIVE | Noted: 2025-05-26

## 2025-05-26 PROBLEM — E78.5 HYPERLIPIDEMIA: Status: ACTIVE | Noted: 2025-05-26

## 2025-05-26 PROBLEM — R29.90 STROKE-LIKE SYMPTOMS: Status: ACTIVE | Noted: 2025-05-26

## 2025-05-26 PROBLEM — R53.1 RIGHT SIDED WEAKNESS: Status: ACTIVE | Noted: 2025-05-26

## 2025-05-26 PROBLEM — K21.9 GASTROESOPHAGEAL REFLUX DISEASE: Status: ACTIVE | Noted: 2025-05-26

## 2025-05-26 LAB
ALBUMIN SERPL BCG-MCNC: 3.8 G/DL (ref 3.4–4.9)
ALP SERPL-CCNC: 79 U/L (ref 38–126)
ALT SERPL W/O P-5'-P-CCNC: 16 U/L (ref 10–35)
ANION GAP SERPL CALC-SCNC: 11 MEQ/L (ref 8–16)
APTT PPP: > 200 SECONDS (ref 22–38)
AST SERPL-CCNC: 23 U/L (ref 10–35)
BASOPHILS ABSOLUTE: 0 THOU/MM3 (ref 0–0.1)
BASOPHILS NFR BLD AUTO: 0.7 %
BILIRUB CONJ SERPL-MCNC: 0.3 MG/DL (ref 0–0.2)
BILIRUB SERPL-MCNC: 0.6 MG/DL (ref 0.3–1.2)
BILIRUB UR QL STRIP.AUTO: NEGATIVE
BUN SERPL-MCNC: 22 MG/DL (ref 8–23)
CALCIUM SERPL-MCNC: 8.9 MG/DL (ref 8.8–10.2)
CHARACTER UR: CLEAR
CHLORIDE SERPL-SCNC: 105 MEQ/L (ref 98–111)
CHP ED QC CHECK: YES
CO2 SERPL-SCNC: 22 MEQ/L (ref 22–29)
COLOR, UA: YELLOW
CREAT SERPL-MCNC: 0.9 MG/DL (ref 0.5–0.9)
DEPRECATED RDW RBC AUTO: 53.8 FL (ref 35–45)
EKG Q-T INTERVAL: 310 MS
EKG Q-T INTERVAL: 318 MS
EKG Q-T INTERVAL: 374 MS
EKG QRS DURATION: 82 MS
EKG QRS DURATION: 86 MS
EKG QRS DURATION: 86 MS
EKG QTC CALCULATION (BAZETT): 409 MS
EKG QTC CALCULATION (BAZETT): 436 MS
EKG QTC CALCULATION (BAZETT): 439 MS
EKG R AXIS: 154 DEGREES
EKG R AXIS: 158 DEGREES
EKG R AXIS: 168 DEGREES
EKG T AXIS: 41 DEGREES
EKG T AXIS: 49 DEGREES
EKG T AXIS: 63 DEGREES
EKG VENTRICULAR RATE: 105 BPM
EKG VENTRICULAR RATE: 113 BPM
EKG VENTRICULAR RATE: 83 BPM
EOSINOPHIL NFR BLD AUTO: 2.2 %
EOSINOPHILS ABSOLUTE: 0.1 THOU/MM3 (ref 0–0.4)
ERYTHROCYTE [DISTWIDTH] IN BLOOD BY AUTOMATED COUNT: 16.4 % (ref 11.5–14.5)
FLUAV RNA RESP QL NAA+PROBE: NOT DETECTED
FLUBV RNA RESP QL NAA+PROBE: NOT DETECTED
GFR SERPL CREATININE-BSD FRML MDRD: 61 ML/MIN/1.73M2
GLUCOSE BLD STRIP.AUTO-MCNC: 98 MG/DL (ref 70–108)
GLUCOSE BLD-MCNC: 98 MG/DL
GLUCOSE SERPL-MCNC: 106 MG/DL (ref 74–109)
GLUCOSE UR QL STRIP.AUTO: NEGATIVE MG/DL
HCT VFR BLD AUTO: 38.8 % (ref 37–47)
HEPARIN UNFRACTIONATED: 1.19 U/ML (ref 0.3–0.7)
HGB BLD-MCNC: 12.3 GM/DL (ref 12–16)
HGB UR QL STRIP.AUTO: NEGATIVE
IMM GRANULOCYTES # BLD AUTO: 0.02 THOU/MM3 (ref 0–0.07)
IMM GRANULOCYTES NFR BLD AUTO: 0.4 %
INR PPP: 1.34 (ref 0.85–1.13)
KETONES UR QL STRIP.AUTO: NEGATIVE
LYMPHOCYTES ABSOLUTE: 0.6 THOU/MM3 (ref 1–4.8)
LYMPHOCYTES NFR BLD AUTO: 11.9 %
MAGNESIUM SERPL-MCNC: 2.1 MG/DL (ref 1.6–2.6)
MCH RBC QN AUTO: 28 PG (ref 26–33)
MCHC RBC AUTO-ENTMCNC: 31.7 GM/DL (ref 32.2–35.5)
MCV RBC AUTO: 88.4 FL (ref 81–99)
MONOCYTES ABSOLUTE: 0.4 THOU/MM3 (ref 0.4–1.3)
MONOCYTES NFR BLD AUTO: 7.3 %
NEUTROPHILS ABSOLUTE: 4.2 THOU/MM3 (ref 1.8–7.7)
NEUTROPHILS NFR BLD AUTO: 77.5 %
NITRITE UR QL STRIP: NEGATIVE
NRBC BLD AUTO-RTO: 0 /100 WBC
OSMOLALITY SERPL CALC.SUM OF ELEC: 279.4 MOSMOL/KG (ref 275–300)
PH UR STRIP.AUTO: 8 [PH] (ref 5–9)
PLATELET # BLD AUTO: 210 THOU/MM3 (ref 130–400)
PMV BLD AUTO: 9.4 FL (ref 9.4–12.4)
POTASSIUM SERPL-SCNC: 3.9 MEQ/L (ref 3.5–5.2)
PROT SERPL-MCNC: 5.8 G/DL (ref 6.4–8.3)
PROT UR STRIP.AUTO-MCNC: NEGATIVE MG/DL
RBC # BLD AUTO: 4.39 MILL/MM3 (ref 4.2–5.4)
SARS-COV-2 RNA RESP QL NAA+PROBE: NOT DETECTED
SODIUM SERPL-SCNC: 138 MEQ/L (ref 135–145)
SP GR UR REFRACT.AUTO: > 1.03 (ref 1–1.03)
TROPONIN, HIGH SENSITIVITY: 72 NG/L (ref 0–12)
TROPONIN, HIGH SENSITIVITY: 73 NG/L (ref 0–12)
UROBILINOGEN, URINE: 0.2 EU/DL (ref 0–1)
WBC # BLD AUTO: 5.4 THOU/MM3 (ref 4.8–10.8)
WBC #/AREA URNS HPF: NEGATIVE /[HPF]

## 2025-05-26 PROCEDURE — 80053 COMPREHEN METABOLIC PANEL: CPT

## 2025-05-26 PROCEDURE — 99223 1ST HOSP IP/OBS HIGH 75: CPT | Performed by: PHYSICIAN ASSISTANT

## 2025-05-26 PROCEDURE — 81003 URINALYSIS AUTO W/O SCOPE: CPT

## 2025-05-26 PROCEDURE — 70551 MRI BRAIN STEM W/O DYE: CPT

## 2025-05-26 PROCEDURE — 36415 COLL VENOUS BLD VENIPUNCTURE: CPT

## 2025-05-26 PROCEDURE — G0378 HOSPITAL OBSERVATION PER HR: HCPCS

## 2025-05-26 PROCEDURE — 85610 PROTHROMBIN TIME: CPT

## 2025-05-26 PROCEDURE — 99223 1ST HOSP IP/OBS HIGH 75: CPT | Performed by: STUDENT IN AN ORGANIZED HEALTH CARE EDUCATION/TRAINING PROGRAM

## 2025-05-26 PROCEDURE — 93005 ELECTROCARDIOGRAM TRACING: CPT | Performed by: STUDENT IN AN ORGANIZED HEALTH CARE EDUCATION/TRAINING PROGRAM

## 2025-05-26 PROCEDURE — G0378 HOSPITAL OBSERVATION PER HR: HCPCS | Performed by: FAMILY MEDICINE

## 2025-05-26 PROCEDURE — 84484 ASSAY OF TROPONIN QUANT: CPT

## 2025-05-26 PROCEDURE — 83735 ASSAY OF MAGNESIUM: CPT

## 2025-05-26 PROCEDURE — 99285 EMERGENCY DEPT VISIT HI MDM: CPT

## 2025-05-26 PROCEDURE — 2500000003 HC RX 250 WO HCPCS: Performed by: STUDENT IN AN ORGANIZED HEALTH CARE EDUCATION/TRAINING PROGRAM

## 2025-05-26 PROCEDURE — 82248 BILIRUBIN DIRECT: CPT

## 2025-05-26 PROCEDURE — 85520 HEPARIN ASSAY: CPT

## 2025-05-26 PROCEDURE — 6370000000 HC RX 637 (ALT 250 FOR IP): Performed by: STUDENT IN AN ORGANIZED HEALTH CARE EDUCATION/TRAINING PROGRAM

## 2025-05-26 PROCEDURE — 87636 SARSCOV2 & INF A&B AMP PRB: CPT

## 2025-05-26 PROCEDURE — 82948 REAGENT STRIP/BLOOD GLUCOSE: CPT

## 2025-05-26 PROCEDURE — 6370000000 HC RX 637 (ALT 250 FOR IP): Performed by: EMERGENCY MEDICINE

## 2025-05-26 PROCEDURE — 85025 COMPLETE CBC W/AUTO DIFF WBC: CPT

## 2025-05-26 PROCEDURE — 85730 THROMBOPLASTIN TIME PARTIAL: CPT

## 2025-05-26 RX ORDER — ASPIRIN 81 MG/1
81 TABLET ORAL DAILY
Status: DISCONTINUED | OUTPATIENT
Start: 2025-05-27 | End: 2025-05-27

## 2025-05-26 RX ORDER — POLYETHYLENE GLYCOL 3350 17 G/17G
17 POWDER, FOR SOLUTION ORAL DAILY PRN
Status: DISCONTINUED | OUTPATIENT
Start: 2025-05-26 | End: 2025-06-01

## 2025-05-26 RX ORDER — SODIUM CHLORIDE 0.9 % (FLUSH) 0.9 %
5-40 SYRINGE (ML) INJECTION PRN
Status: DISCONTINUED | OUTPATIENT
Start: 2025-05-26 | End: 2025-06-03 | Stop reason: HOSPADM

## 2025-05-26 RX ORDER — TRIAMCINOLONE ACETONIDE 1 MG/G
CREAM TOPICAL 2 TIMES DAILY
Status: DISCONTINUED | OUTPATIENT
Start: 2025-05-26 | End: 2025-06-03 | Stop reason: HOSPADM

## 2025-05-26 RX ORDER — FAMOTIDINE 20 MG/1
20 TABLET, FILM COATED ORAL DAILY
Status: DISCONTINUED | OUTPATIENT
Start: 2025-05-27 | End: 2025-06-03 | Stop reason: HOSPADM

## 2025-05-26 RX ORDER — LATANOPROST 50 UG/ML
1 SOLUTION/ DROPS OPHTHALMIC NIGHTLY
Status: DISCONTINUED | OUTPATIENT
Start: 2025-05-26 | End: 2025-06-03 | Stop reason: HOSPADM

## 2025-05-26 RX ORDER — ONDANSETRON 4 MG/1
4 TABLET, ORALLY DISINTEGRATING ORAL EVERY 8 HOURS PRN
Status: DISCONTINUED | OUTPATIENT
Start: 2025-05-26 | End: 2025-06-03 | Stop reason: HOSPADM

## 2025-05-26 RX ORDER — ENOXAPARIN SODIUM 100 MG/ML
40 INJECTION SUBCUTANEOUS DAILY
Status: DISCONTINUED | OUTPATIENT
Start: 2025-05-27 | End: 2025-05-29

## 2025-05-26 RX ORDER — CLOPIDOGREL BISULFATE 75 MG/1
75 TABLET ORAL DAILY
Status: DISCONTINUED | OUTPATIENT
Start: 2025-05-27 | End: 2025-06-03 | Stop reason: HOSPADM

## 2025-05-26 RX ORDER — CLOPIDOGREL BISULFATE 75 MG/1
75 TABLET ORAL ONCE
Status: COMPLETED | OUTPATIENT
Start: 2025-05-26 | End: 2025-05-26

## 2025-05-26 RX ORDER — SODIUM CHLORIDE 0.9 % (FLUSH) 0.9 %
5-40 SYRINGE (ML) INJECTION EVERY 12 HOURS SCHEDULED
Status: DISCONTINUED | OUTPATIENT
Start: 2025-05-26 | End: 2025-06-03 | Stop reason: HOSPADM

## 2025-05-26 RX ORDER — ASPIRIN 81 MG/1
81 TABLET, CHEWABLE ORAL ONCE
Status: COMPLETED | OUTPATIENT
Start: 2025-05-26 | End: 2025-05-26

## 2025-05-26 RX ORDER — LATANOPROST 50 UG/ML
1 SOLUTION/ DROPS OPHTHALMIC NIGHTLY
Status: ON HOLD | COMMUNITY

## 2025-05-26 RX ORDER — CHLORAL HYDRATE 500 MG
1 CAPSULE ORAL DAILY
Status: DISCONTINUED | OUTPATIENT
Start: 2025-05-26 | End: 2025-06-03 | Stop reason: HOSPADM

## 2025-05-26 RX ORDER — METOPROLOL TARTRATE 50 MG
50 TABLET ORAL 2 TIMES DAILY
Status: DISCONTINUED | OUTPATIENT
Start: 2025-05-26 | End: 2025-05-29

## 2025-05-26 RX ORDER — ONDANSETRON 2 MG/ML
4 INJECTION INTRAMUSCULAR; INTRAVENOUS EVERY 6 HOURS PRN
Status: DISCONTINUED | OUTPATIENT
Start: 2025-05-26 | End: 2025-06-03 | Stop reason: HOSPADM

## 2025-05-26 RX ORDER — MULTIVITAMIN WITH IRON
1 TABLET ORAL DAILY
Status: DISCONTINUED | OUTPATIENT
Start: 2025-05-26 | End: 2025-06-03 | Stop reason: HOSPADM

## 2025-05-26 RX ORDER — SODIUM CHLORIDE 9 MG/ML
INJECTION, SOLUTION INTRAVENOUS PRN
Status: DISCONTINUED | OUTPATIENT
Start: 2025-05-26 | End: 2025-06-03 | Stop reason: HOSPADM

## 2025-05-26 RX ORDER — DILTIAZEM HYDROCHLORIDE 120 MG/1
120 CAPSULE, EXTENDED RELEASE ORAL DAILY
Status: ON HOLD | COMMUNITY

## 2025-05-26 RX ORDER — DILTIAZEM HYDROCHLORIDE 120 MG/1
120 CAPSULE, COATED, EXTENDED RELEASE ORAL DAILY
Status: DISCONTINUED | OUTPATIENT
Start: 2025-05-26 | End: 2025-06-03 | Stop reason: HOSPADM

## 2025-05-26 RX ORDER — BUMETANIDE 1 MG/1
1 TABLET ORAL DAILY
Status: DISCONTINUED | OUTPATIENT
Start: 2025-05-26 | End: 2025-06-03 | Stop reason: HOSPADM

## 2025-05-26 RX ORDER — ASPIRIN 81 MG/1
324 TABLET, CHEWABLE ORAL ONCE
Status: DISCONTINUED | OUTPATIENT
Start: 2025-05-26 | End: 2025-05-26

## 2025-05-26 RX ORDER — ATORVASTATIN CALCIUM 80 MG/1
80 TABLET, FILM COATED ORAL NIGHTLY
Status: DISCONTINUED | OUTPATIENT
Start: 2025-05-26 | End: 2025-06-03 | Stop reason: HOSPADM

## 2025-05-26 RX ORDER — POTASSIUM CHLORIDE 1500 MG/1
10 TABLET, EXTENDED RELEASE ORAL DAILY
Status: DISCONTINUED | OUTPATIENT
Start: 2025-05-26 | End: 2025-06-03 | Stop reason: HOSPADM

## 2025-05-26 RX ADMIN — ATORVASTATIN CALCIUM 80 MG: 80 TABLET, FILM COATED ORAL at 21:36

## 2025-05-26 RX ADMIN — Medication 1 TABLET: at 21:36

## 2025-05-26 RX ADMIN — LATANOPROST 1 DROP: 50 SOLUTION OPHTHALMIC at 21:36

## 2025-05-26 RX ADMIN — ASPIRIN 81 MG: 81 TABLET, CHEWABLE ORAL at 12:58

## 2025-05-26 RX ADMIN — SODIUM CHLORIDE, PRESERVATIVE FREE 10 ML: 5 INJECTION INTRAVENOUS at 21:36

## 2025-05-26 RX ADMIN — Medication 1000 MG: at 21:36

## 2025-05-26 RX ADMIN — TRIAMCINOLONE ACETONIDE: 1 CREAM TOPICAL at 21:36

## 2025-05-26 RX ADMIN — CLOPIDOGREL BISULFATE 75 MG: 75 TABLET, FILM COATED ORAL at 12:58

## 2025-05-26 ASSESSMENT — PAIN SCALES - GENERAL: PAINLEVEL_OUTOF10: 0

## 2025-05-26 NOTE — ED NOTES
Spoke with  staff to approve pt transport to [facility]. Pt in stable condition at this time.

## 2025-05-26 NOTE — ED NOTES
Pt to the ED via [facility]. Patient presents after CT scans revealed an occlusion in the left carotid artery. [facility] states that a 5000 Heparin bolus was given, Heparin drip was started and then stopped. Patient expresses she has an improvement in symptoms. EKG done, second IV inserted. Patient is alert and oriented x 4. Respirations are regular and unlabored. Patient provided blanket. Call light within reach.
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
## 2025-05-26 NOTE — ED NOTES
ED to inpatient nurses report      Chief Complaint:  Chief Complaint   Patient presents with    Headache    Blurred Vision     Present to ED from: Home    MOA:     LOC: alert and orientated to name, place, date  Mobility: Requires assistance * 2  Oxygen Baseline: RA    Current needs required: RA     Code Status:   Prior    What abnormal results were found and what did you give/do to treat them? Occlusion (heparin, plavix, ASA)  Any procedures or intervention occur? Labs    Mental Status:  Level of Consciousness: Alert (0)    Psych Assessment:        Vitals:  Patient Vitals for the past 24 hrs:   BP Temp Temp src Pulse Resp SpO2   05/26/25 1245 (!) 140/108 -- -- 97 18 95 %   05/26/25 1215 (!) 151/110 97.8 °F (36.6 °C) Oral 99 15 94 %        LDAs:   Peripheral IV 05/26/25 Proximal;Right Forearm (Active)   Site Assessment Clean, dry & intact 05/26/25 1220   Line Status Normal saline locked 05/26/25 1220   Phlebitis Assessment No symptoms 05/26/25 1220   Infiltration Assessment 0 05/26/25 1220   Dressing Status Clean, dry & intact 05/26/25 1220   Dressing Type Securing device;Transparent 05/26/25 1220       Peripheral IV 05/26/25 Right Forearm (Active)   Site Assessment Clean, dry & intact 05/26/25 1221   Line Status Blood return noted;Specimen collected;Normal saline locked;Flushed 05/26/25 1221   Phlebitis Assessment No symptoms 05/26/25 1221   Infiltration Assessment 0 05/26/25 1221   Dressing Status Clean, dry & intact 05/26/25 1221   Dressing Type Securing device;Transparent 05/26/25 1221       Ambulatory Status:  Presents to emergency department  because of falls (Syncope, seizure, or loss of consciousness): No, Age > 70: Yes, Altered Mental Status, Intoxication with alcohol or substance confusion (Disorientation, impaired judgment, poor safety awaremess, or inability to follow instructions): No, Impaired Mobility: Ambulates or transfers with assistive devices or assistance; Unable to ambulate or transer.: Yes,

## 2025-05-26 NOTE — PROCEDURES
PROCEDURE NOTE  Date: 5/26/2025   Name: Lulu Werner  YOB: 1935    Procedures  12 lead EKG completed. Results handed to Ching FINNEGAN.

## 2025-05-27 ENCOUNTER — APPOINTMENT (OUTPATIENT)
Age: 89
DRG: 065 | End: 2025-05-27
Payer: MEDICARE

## 2025-05-27 ENCOUNTER — APPOINTMENT (OUTPATIENT)
Dept: MRI IMAGING | Age: 89
DRG: 065 | End: 2025-05-27
Payer: MEDICARE

## 2025-05-27 ENCOUNTER — APPOINTMENT (OUTPATIENT)
Dept: CT IMAGING | Age: 89
DRG: 065 | End: 2025-05-27
Payer: MEDICARE

## 2025-05-27 LAB
ANION GAP SERPL CALC-SCNC: 12 MEQ/L (ref 8–16)
BUN SERPL-MCNC: 19 MG/DL (ref 8–23)
CALCIUM SERPL-MCNC: 9.5 MG/DL (ref 8.8–10.2)
CHLORIDE SERPL-SCNC: 106 MEQ/L (ref 98–111)
CHOLEST SERPL-MCNC: 172 MG/DL (ref 100–199)
CO2 SERPL-SCNC: 21 MEQ/L (ref 22–29)
CREAT SERPL-MCNC: 1 MG/DL (ref 0.5–0.9)
DEPRECATED MEAN GLUCOSE BLD GHB EST-ACNC: 114 MG/DL (ref 70–126)
DEPRECATED RDW RBC AUTO: 52.6 FL (ref 35–45)
ECHO AO ASC DIAM: 3.7 CM
ECHO AO ASCENDING AORTA INDEX: 2.42 CM/M2
ECHO AV AREA PEAK VELOCITY: 1.5 CM2
ECHO AV AREA VTI: 1.4 CM2
ECHO AV AREA/BSA PEAK VELOCITY: 1 CM2/M2
ECHO AV AREA/BSA VTI: 0.9 CM2/M2
ECHO AV CUSP MM: 1.3 CM
ECHO AV MEAN GRADIENT: 10 MMHG
ECHO AV MEAN VELOCITY: 1.4 M/S
ECHO AV PEAK GRADIENT: 17 MMHG
ECHO AV PEAK VELOCITY: 2 M/S
ECHO AV VELOCITY RATIO: 0.5
ECHO AV VTI: 31 CM
ECHO BSA: 1.55 M2
ECHO EST RA PRESSURE: 3 MMHG
ECHO LA AREA 2C: 23.2 CM2
ECHO LA AREA 4C: 20.2 CM2
ECHO LA DIAMETER INDEX: 2.48 CM/M2
ECHO LA DIAMETER: 3.8 CM
ECHO LA MAJOR AXIS: 5.9 CM
ECHO LA MINOR AXIS: 6.2 CM
ECHO LA VOL BP: 64 ML (ref 22–52)
ECHO LA VOL MOD A2C: 70 ML (ref 22–52)
ECHO LA VOL MOD A4C: 56 ML (ref 22–52)
ECHO LA VOL/BSA BIPLANE: 42 ML/M2 (ref 16–34)
ECHO LA VOLUME INDEX MOD A2C: 46 ML/M2 (ref 16–34)
ECHO LA VOLUME INDEX MOD A4C: 37 ML/M2 (ref 16–34)
ECHO LV E' LATERAL VELOCITY: 9 CM/S
ECHO LV E' SEPTAL VELOCITY: 7.5 CM/S
ECHO LV EDV A2C: 52 ML
ECHO LV EDV A4C: 51 ML
ECHO LV EDV INDEX A4C: 33 ML/M2
ECHO LV EDV NDEX A2C: 34 ML/M2
ECHO LV EF PHYSICIAN: 60 %
ECHO LV EJECTION FRACTION A2C: 63 %
ECHO LV EJECTION FRACTION A4C: 62 %
ECHO LV EJECTION FRACTION BIPLANE: 62 % (ref 55–100)
ECHO LV ESV A2C: 19 ML
ECHO LV ESV A4C: 19 ML
ECHO LV ESV INDEX A2C: 12 ML/M2
ECHO LV ESV INDEX A4C: 12 ML/M2
ECHO LV FRACTIONAL SHORTENING: 33 % (ref 28–44)
ECHO LV INTERNAL DIMENSION DIASTOLE INDEX: 2.16 CM/M2
ECHO LV INTERNAL DIMENSION DIASTOLIC: 3.3 CM (ref 3.9–5.3)
ECHO LV INTERNAL DIMENSION SYSTOLIC INDEX: 1.44 CM/M2
ECHO LV INTERNAL DIMENSION SYSTOLIC: 2.2 CM
ECHO LV ISOVOLUMETRIC RELAXATION TIME (IVRT): 95 MS
ECHO LV IVSD: 1.2 CM (ref 0.6–0.9)
ECHO LV MASS 2D: 124.8 G (ref 67–162)
ECHO LV MASS INDEX 2D: 81.6 G/M2 (ref 43–95)
ECHO LV POSTERIOR WALL DIASTOLIC: 1.2 CM (ref 0.6–0.9)
ECHO LV RELATIVE WALL THICKNESS RATIO: 0.73
ECHO LVOT AREA: 2.8 CM2
ECHO LVOT AV VTI INDEX: 0.53
ECHO LVOT DIAM: 1.9 CM
ECHO LVOT MEAN GRADIENT: 2 MMHG
ECHO LVOT PEAK GRADIENT: 4 MMHG
ECHO LVOT PEAK VELOCITY: 1 M/S
ECHO LVOT STROKE VOLUME INDEX: 30.4 ML/M2
ECHO LVOT SV: 46.5 ML
ECHO LVOT VTI: 16.4 CM
ECHO MV E DECELERATION TIME (DT): 185 MS
ECHO MV E VELOCITY: 0.94 M/S
ECHO MV E/E' LATERAL: 10.44
ECHO MV E/E' RATIO (AVERAGED): 11.49
ECHO MV E/E' SEPTAL: 12.53
ECHO MV REGURGITANT PEAK GRADIENT: 117 MMHG
ECHO MV REGURGITANT PEAK VELOCITY: 5.4 M/S
ECHO PULMONARY ARTERY END DIASTOLIC PRESSURE: 13 MMHG
ECHO PV MAX VELOCITY: 0.5 M/S
ECHO PV PEAK GRADIENT: 1 MMHG
ECHO PV REGURGITANT MAX VELOCITY: 1.8 M/S
ECHO RIGHT VENTRICULAR SYSTOLIC PRESSURE (RVSP): 43 MMHG
ECHO RV INTERNAL DIMENSION: 3.2 CM
ECHO RV TAPSE: 1.2 CM (ref 1.7–?)
ECHO TV E WAVE: 0.7 M/S
ECHO TV REGURGITANT MAX VELOCITY: 3.16 M/S
ECHO TV REGURGITANT PEAK GRADIENT: 40 MMHG
EKG Q-T INTERVAL: 352 MS
EKG QRS DURATION: 86 MS
EKG QTC CALCULATION (BAZETT): 491 MS
EKG R AXIS: 171 DEGREES
EKG T AXIS: 57 DEGREES
EKG VENTRICULAR RATE: 117 BPM
ERYTHROCYTE [DISTWIDTH] IN BLOOD BY AUTOMATED COUNT: 16.5 % (ref 11.5–14.5)
GFR SERPL CREATININE-BSD FRML MDRD: 54 ML/MIN/1.73M2
GLUCOSE BLD STRIP.AUTO-MCNC: 104 MG/DL (ref 70–108)
GLUCOSE SERPL-MCNC: 112 MG/DL (ref 74–109)
HBA1C MFR BLD HPLC: 5.8 % (ref 4–6)
HCT VFR BLD AUTO: 40.7 % (ref 37–47)
HDLC SERPL-MCNC: 32 MG/DL
HGB BLD-MCNC: 13 GM/DL (ref 12–16)
LDLC SERPL CALC-MCNC: 114 MG/DL
MAGNESIUM SERPL-MCNC: 2.3 MG/DL (ref 1.6–2.6)
MCH RBC QN AUTO: 27.9 PG (ref 26–33)
MCHC RBC AUTO-ENTMCNC: 31.9 GM/DL (ref 32.2–35.5)
MCV RBC AUTO: 87.3 FL (ref 81–99)
PLATELET # BLD AUTO: 218 THOU/MM3 (ref 130–400)
PMV BLD AUTO: 9.5 FL (ref 9.4–12.4)
POTASSIUM SERPL-SCNC: 3.7 MEQ/L (ref 3.5–5.2)
RBC # BLD AUTO: 4.66 MILL/MM3 (ref 4.2–5.4)
SODIUM SERPL-SCNC: 139 MEQ/L (ref 135–145)
TRIGL SERPL-MCNC: 131 MG/DL (ref 0–199)
TROPONIN, HIGH SENSITIVITY: 68 NG/L (ref 0–12)
WBC # BLD AUTO: 4.8 THOU/MM3 (ref 4.8–10.8)

## 2025-05-27 PROCEDURE — 80048 BASIC METABOLIC PNL TOTAL CA: CPT

## 2025-05-27 PROCEDURE — 96372 THER/PROPH/DIAG INJ SC/IM: CPT

## 2025-05-27 PROCEDURE — 93306 TTE W/DOPPLER COMPLETE: CPT

## 2025-05-27 PROCEDURE — 84484 ASSAY OF TROPONIN QUANT: CPT

## 2025-05-27 PROCEDURE — 2580000003 HC RX 258

## 2025-05-27 PROCEDURE — 96361 HYDRATE IV INFUSION ADD-ON: CPT

## 2025-05-27 PROCEDURE — 92610 EVALUATE SWALLOWING FUNCTION: CPT

## 2025-05-27 PROCEDURE — 6370000000 HC RX 637 (ALT 250 FOR IP)

## 2025-05-27 PROCEDURE — 83036 HEMOGLOBIN GLYCOSYLATED A1C: CPT

## 2025-05-27 PROCEDURE — 82948 REAGENT STRIP/BLOOD GLUCOSE: CPT

## 2025-05-27 PROCEDURE — 70551 MRI BRAIN STEM W/O DYE: CPT

## 2025-05-27 PROCEDURE — 6370000000 HC RX 637 (ALT 250 FOR IP): Performed by: STUDENT IN AN ORGANIZED HEALTH CARE EDUCATION/TRAINING PROGRAM

## 2025-05-27 PROCEDURE — 99232 SBSQ HOSP IP/OBS MODERATE 35: CPT

## 2025-05-27 PROCEDURE — 93005 ELECTROCARDIOGRAM TRACING: CPT | Performed by: STUDENT IN AN ORGANIZED HEALTH CARE EDUCATION/TRAINING PROGRAM

## 2025-05-27 PROCEDURE — 83735 ASSAY OF MAGNESIUM: CPT

## 2025-05-27 PROCEDURE — G0378 HOSPITAL OBSERVATION PER HR: HCPCS

## 2025-05-27 PROCEDURE — 93306 TTE W/DOPPLER COMPLETE: CPT | Performed by: INTERNAL MEDICINE

## 2025-05-27 PROCEDURE — 6360000002 HC RX W HCPCS: Performed by: STUDENT IN AN ORGANIZED HEALTH CARE EDUCATION/TRAINING PROGRAM

## 2025-05-27 PROCEDURE — 99232 SBSQ HOSP IP/OBS MODERATE 35: CPT | Performed by: STUDENT IN AN ORGANIZED HEALTH CARE EDUCATION/TRAINING PROGRAM

## 2025-05-27 PROCEDURE — 80061 LIPID PANEL: CPT

## 2025-05-27 PROCEDURE — 70450 CT HEAD/BRAIN W/O DYE: CPT

## 2025-05-27 PROCEDURE — 36415 COLL VENOUS BLD VENIPUNCTURE: CPT

## 2025-05-27 PROCEDURE — 96375 TX/PRO/DX INJ NEW DRUG ADDON: CPT

## 2025-05-27 PROCEDURE — 85027 COMPLETE CBC AUTOMATED: CPT

## 2025-05-27 RX ORDER — DIGOXIN 0.25 MG/ML
250 INJECTION INTRAMUSCULAR; INTRAVENOUS ONCE
Status: COMPLETED | OUTPATIENT
Start: 2025-05-27 | End: 2025-05-27

## 2025-05-27 RX ORDER — CLOPIDOGREL BISULFATE 75 MG/1
300 TABLET ORAL ONCE
Status: COMPLETED | OUTPATIENT
Start: 2025-05-27 | End: 2025-05-27

## 2025-05-27 RX ORDER — SODIUM CHLORIDE 9 MG/ML
INJECTION, SOLUTION INTRAVENOUS CONTINUOUS
Status: ACTIVE | OUTPATIENT
Start: 2025-05-27 | End: 2025-05-27

## 2025-05-27 RX ORDER — SENNOSIDES 8.6 MG
325 CAPSULE ORAL DAILY
Status: DISCONTINUED | OUTPATIENT
Start: 2025-05-28 | End: 2025-06-03 | Stop reason: HOSPADM

## 2025-05-27 RX ADMIN — SODIUM CHLORIDE: 0.9 INJECTION, SOLUTION INTRAVENOUS at 04:14

## 2025-05-27 RX ADMIN — CLOPIDOGREL BISULFATE 300 MG: 75 TABLET, FILM COATED ORAL at 04:16

## 2025-05-27 RX ADMIN — Medication 1000 MG: at 08:56

## 2025-05-27 RX ADMIN — FAMOTIDINE 20 MG: 20 TABLET, FILM COATED ORAL at 08:56

## 2025-05-27 RX ADMIN — ENOXAPARIN SODIUM 40 MG: 100 INJECTION SUBCUTANEOUS at 09:00

## 2025-05-27 RX ADMIN — METOPROLOL TARTRATE 50 MG: 50 TABLET, FILM COATED ORAL at 09:31

## 2025-05-27 RX ADMIN — TRIAMCINOLONE ACETONIDE: 1 CREAM TOPICAL at 08:56

## 2025-05-27 RX ADMIN — DIGOXIN 250 MCG: 0.25 INJECTION INTRAMUSCULAR; INTRAVENOUS at 11:36

## 2025-05-27 RX ADMIN — CLOPIDOGREL BISULFATE 75 MG: 75 TABLET, FILM COATED ORAL at 08:56

## 2025-05-27 RX ADMIN — METOPROLOL TARTRATE 50 MG: 50 TABLET, FILM COATED ORAL at 19:51

## 2025-05-27 RX ADMIN — LATANOPROST 1 DROP: 50 SOLUTION OPHTHALMIC at 19:51

## 2025-05-27 RX ADMIN — ASPIRIN 81 MG: 81 TABLET, COATED ORAL at 08:56

## 2025-05-27 RX ADMIN — ATORVASTATIN CALCIUM 80 MG: 80 TABLET, FILM COATED ORAL at 19:51

## 2025-05-27 RX ADMIN — TRIAMCINOLONE ACETONIDE: 1 CREAM TOPICAL at 19:51

## 2025-05-27 RX ADMIN — Medication 1 TABLET: at 08:56

## 2025-05-27 ASSESSMENT — PAIN SCALES - WONG BAKER
WONGBAKER_NUMERICALRESPONSE: NO HURT

## 2025-05-27 ASSESSMENT — PAIN SCALES - GENERAL
PAINLEVEL_OUTOF10: 0

## 2025-05-27 NOTE — PROCEDURES
PROCEDURE NOTE  Date: 5/27/2025   Name: Lulu Werner  YOB: 1935    Procedures  12 lead EKG completed. Results handed to Angel FINNEGAN.

## 2025-05-27 NOTE — CARE COORDINATION
Case Management Assessment Initial Evaluation    Date/Time of Evaluation: 2025 8:32 AM  Assessment Completed by: Liana Joseph RN    If patient is discharged prior to next notation, then this note serves as note for discharge by case management.    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  Health Care Decision Makers:     Additional Case Management Notes: From [facility]. PT/OT/SLP, Neurology consult, telemetry, Asa, Plavix, Lovenox, Zofran prn.     Procedures:    ECHO    Imagin/27 CT Head WO Contrast No acute intracranial CT abnormality.      MRI Brain WO Contrast 1. No evidence of an acute infarct.   2. Moderate severity chronic small vessel ischemic changes.   3. Bilateral scattered old microhemorrhages.   4. Asymmetric signal in the globes. Recommend correlation with direct   examination.     Patient Goals/Plan/Treatment Preferences: Met with Lulu and her son, Nba. Nba shares that Lulu currently lives at home, has a cane. Discharge plan is pending clinical course. Will follow for potential needs.           [phone]   Service Assessment   Patient Orientation Alert and Oriented   Cognition Other (see comment)  (pt aphasic)   History Provided By Child/Family   Primary Caregiver Self   Support Systems Children   Patient's Healthcare Decision Maker is: Patient Declined (Legal Next of Kin Remains as Decision Maker)   PCP Verified by CM Yes   Last Visit to PCP Within last year   Prior Functional Level Independent in ADLs/IADLs   Current Functional Level Assistance with the

## 2025-05-27 NOTE — SIGNIFICANT EVENT
Patient admitted today for right sided weakness and numbness in setting of left ICA occlusion consistent with acute CVA.  Neurology following.  Patient started on Plavix and continued home aspirin.  Per H&P, patient not a TNK candidate per neurology as patient outside window and deemed not a candidate for thrombectomy.  Per nurse at bedside, patient initial NIH at beginning of shift 3.  Last known well approximately 1 AM.  At 3 AM, patient had gotten up to go to the bathroom and had acute onset of aphasia, not following commands.  NIH repeated with score of 8.  Glucose 104.  /121, pulse 96.  At time of my exam, patient dysarthric, not following commands.  Attempts to answer questions but difficulty word finding/mumbling.  Noted mild right facial droop and right sided weakness.  Code stroke called and patient taken to CT.  CT head completed.  Neurointerventionalist called, CT head reviewed.  Did not recommend getting CTA head/neck as patient already had this completed earlier, MRI also completed earlier.  CT head negative at this time.  Not a TNK candidate per neurointerventionalist.  Recommendation for SBP goal 180-200, start IV fluids.  Give one-time loading dose of Plavix 300.  Order for bed rest and keep patient flat to assist with permissive hypertension.        Electronically signed by PATRICIA Souza CNP on 5/27/2025 at 4:21 AM

## 2025-05-27 NOTE — CODE DOCUMENTATION
0310: Patient was walking back to the bathroom and had new onset aphasia and dysarthria. .  0314: Charlotte Driver NP at bedside. NIH at beginning of shift was 3 and new NIH is 8.   0321: RR called. Bony Sevilla, Dr. Dorsey, House supervisor at bedside.   0323: Code Stroke called.   0329: At CT. Dr. Grande, EKG at bedside.

## 2025-05-28 PROBLEM — I63.232 CEREBROVASCULAR ACCIDENT (CVA) DUE TO OCCLUSION OF LEFT CAROTID ARTERY (HCC): Status: ACTIVE | Noted: 2025-05-28

## 2025-05-28 PROBLEM — I63.9 ACUTE ISCHEMIC STROKE (HCC): Status: ACTIVE | Noted: 2025-05-28

## 2025-05-28 LAB
ANION GAP SERPL CALC-SCNC: 14 MEQ/L (ref 8–16)
BUN SERPL-MCNC: 16 MG/DL (ref 8–23)
CA-I BLD ISE-SCNC: 1.14 MMOL/L (ref 1.12–1.32)
CALCIUM SERPL-MCNC: 9.5 MG/DL (ref 8.8–10.2)
CHLORIDE SERPL-SCNC: 103 MEQ/L (ref 98–111)
CO2 SERPL-SCNC: 20 MEQ/L (ref 22–29)
CREAT SERPL-MCNC: 1 MG/DL (ref 0.5–0.9)
DEPRECATED RDW RBC AUTO: 53.5 FL (ref 35–45)
ERYTHROCYTE [DISTWIDTH] IN BLOOD BY AUTOMATED COUNT: 16.4 % (ref 11.5–14.5)
GFR SERPL CREATININE-BSD FRML MDRD: 54 ML/MIN/1.73M2
GLUCOSE SERPL-MCNC: 116 MG/DL (ref 74–109)
HCT VFR BLD AUTO: 44.5 % (ref 37–47)
HGB BLD-MCNC: 14.2 GM/DL (ref 12–16)
MAGNESIUM SERPL-MCNC: 2.3 MG/DL (ref 1.6–2.6)
MCH RBC QN AUTO: 28.3 PG (ref 26–33)
MCHC RBC AUTO-ENTMCNC: 31.9 GM/DL (ref 32.2–35.5)
MCV RBC AUTO: 88.6 FL (ref 81–99)
PHOSPHATE SERPL-MCNC: 3.1 MG/DL (ref 2.5–4.5)
PLATELET # BLD AUTO: 205 THOU/MM3 (ref 130–400)
PMV BLD AUTO: 9.3 FL (ref 9.4–12.4)
POTASSIUM SERPL-SCNC: 3.9 MEQ/L (ref 3.5–5.2)
RBC # BLD AUTO: 5.02 MILL/MM3 (ref 4.2–5.4)
SODIUM SERPL-SCNC: 137 MEQ/L (ref 135–145)
WBC # BLD AUTO: 8.3 THOU/MM3 (ref 4.8–10.8)

## 2025-05-28 PROCEDURE — 36415 COLL VENOUS BLD VENIPUNCTURE: CPT

## 2025-05-28 PROCEDURE — 84100 ASSAY OF PHOSPHORUS: CPT

## 2025-05-28 PROCEDURE — 82330 ASSAY OF CALCIUM: CPT

## 2025-05-28 PROCEDURE — 6370000000 HC RX 637 (ALT 250 FOR IP): Performed by: STUDENT IN AN ORGANIZED HEALTH CARE EDUCATION/TRAINING PROGRAM

## 2025-05-28 PROCEDURE — 92523 SPEECH SOUND LANG COMPREHEN: CPT

## 2025-05-28 PROCEDURE — 2500000003 HC RX 250 WO HCPCS: Performed by: STUDENT IN AN ORGANIZED HEALTH CARE EDUCATION/TRAINING PROGRAM

## 2025-05-28 PROCEDURE — 83735 ASSAY OF MAGNESIUM: CPT

## 2025-05-28 PROCEDURE — 96366 THER/PROPH/DIAG IV INF ADDON: CPT

## 2025-05-28 PROCEDURE — 6360000002 HC RX W HCPCS: Performed by: STUDENT IN AN ORGANIZED HEALTH CARE EDUCATION/TRAINING PROGRAM

## 2025-05-28 PROCEDURE — 80048 BASIC METABOLIC PNL TOTAL CA: CPT

## 2025-05-28 PROCEDURE — 6370000000 HC RX 637 (ALT 250 FOR IP)

## 2025-05-28 PROCEDURE — 96365 THER/PROPH/DIAG IV INF INIT: CPT

## 2025-05-28 PROCEDURE — 99232 SBSQ HOSP IP/OBS MODERATE 35: CPT

## 2025-05-28 PROCEDURE — 99233 SBSQ HOSP IP/OBS HIGH 50: CPT

## 2025-05-28 PROCEDURE — 1200000003 HC TELEMETRY R&B

## 2025-05-28 PROCEDURE — 85027 COMPLETE CBC AUTOMATED: CPT

## 2025-05-28 PROCEDURE — 96372 THER/PROPH/DIAG INJ SC/IM: CPT

## 2025-05-28 PROCEDURE — 1200000000 HC SEMI PRIVATE

## 2025-05-28 PROCEDURE — 92610 EVALUATE SWALLOWING FUNCTION: CPT

## 2025-05-28 RX ADMIN — BUMETANIDE 1 MG: 1 TABLET ORAL at 10:04

## 2025-05-28 RX ADMIN — POTASSIUM BICARBONATE 20 MEQ: 782 TABLET, EFFERVESCENT ORAL at 15:49

## 2025-05-28 RX ADMIN — AMIODARONE HYDROCHLORIDE 0.5 MG/MIN: 1.8 INJECTION, SOLUTION INTRAVENOUS at 06:54

## 2025-05-28 RX ADMIN — DILTIAZEM HYDROCHLORIDE 120 MG: 120 CAPSULE, EXTENDED RELEASE ORAL at 10:04

## 2025-05-28 RX ADMIN — SODIUM CHLORIDE, PRESERVATIVE FREE 10 ML: 5 INJECTION INTRAVENOUS at 10:06

## 2025-05-28 RX ADMIN — AMIODARONE HYDROCHLORIDE 150 MG: 1.5 INJECTION, SOLUTION INTRAVENOUS at 00:37

## 2025-05-28 RX ADMIN — SODIUM CHLORIDE, PRESERVATIVE FREE 10 ML: 5 INJECTION INTRAVENOUS at 21:55

## 2025-05-28 RX ADMIN — AMIODARONE HYDROCHLORIDE 1 MG/MIN: 1.8 INJECTION, SOLUTION INTRAVENOUS at 00:48

## 2025-05-28 RX ADMIN — Medication 1 TABLET: at 10:11

## 2025-05-28 RX ADMIN — ENOXAPARIN SODIUM 40 MG: 100 INJECTION SUBCUTANEOUS at 10:12

## 2025-05-28 RX ADMIN — METOPROLOL TARTRATE 50 MG: 50 TABLET, FILM COATED ORAL at 10:12

## 2025-05-28 RX ADMIN — CLOPIDOGREL BISULFATE 75 MG: 75 TABLET, FILM COATED ORAL at 10:11

## 2025-05-28 RX ADMIN — TRIAMCINOLONE ACETONIDE: 1 CREAM TOPICAL at 10:06

## 2025-05-28 RX ADMIN — FAMOTIDINE 20 MG: 20 TABLET, FILM COATED ORAL at 10:12

## 2025-05-28 RX ADMIN — Medication 1000 MG: at 10:12

## 2025-05-28 RX ADMIN — ASPIRIN 325 MG: 325 TABLET, COATED ORAL at 10:12

## 2025-05-28 NOTE — CARE COORDINATION
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 (Stopped at 1023).  PT/OT/SLP, telemetry, Asa, Plavix, Lovenox, Zofran prn, Bumex, Neurology following. Off bedrest.    PCP: Yayo Wren APRN - CNP  Readmission Risk Score: 10.2    Patient Goals/Plan/Treatment Preferences: From home alone with family support. Await therapy recommendations for potential needs/services.

## 2025-05-28 NOTE — SIGNIFICANT EVENT
Patient staying in A.fib RVR with HR in 130-140s, not very complaint with oral meds, will initiate amiodarone bolus followed by gtt    Electronically signed by Sharon Rutherford MD on 5/28/2025 at 12:34 AM

## 2025-05-29 ENCOUNTER — APPOINTMENT (OUTPATIENT)
Dept: INTERVENTIONAL RADIOLOGY/VASCULAR | Age: 89
DRG: 065 | End: 2025-05-29
Payer: MEDICARE

## 2025-05-29 PROBLEM — I63.9 CEREBROVASCULAR ACCIDENT (CVA) (HCC): Status: ACTIVE | Noted: 2025-05-28

## 2025-05-29 LAB — ECHO BSA: 1.55 M2

## 2025-05-29 PROCEDURE — 2500000003 HC RX 250 WO HCPCS: Performed by: STUDENT IN AN ORGANIZED HEALTH CARE EDUCATION/TRAINING PROGRAM

## 2025-05-29 PROCEDURE — 97162 PT EVAL MOD COMPLEX 30 MIN: CPT

## 2025-05-29 PROCEDURE — 51701 INSERT BLADDER CATHETER: CPT

## 2025-05-29 PROCEDURE — 6360000002 HC RX W HCPCS: Performed by: STUDENT IN AN ORGANIZED HEALTH CARE EDUCATION/TRAINING PROGRAM

## 2025-05-29 PROCEDURE — 6370000000 HC RX 637 (ALT 250 FOR IP)

## 2025-05-29 PROCEDURE — 6370000000 HC RX 637 (ALT 250 FOR IP): Performed by: STUDENT IN AN ORGANIZED HEALTH CARE EDUCATION/TRAINING PROGRAM

## 2025-05-29 PROCEDURE — 97110 THERAPEUTIC EXERCISES: CPT

## 2025-05-29 PROCEDURE — 99233 SBSQ HOSP IP/OBS HIGH 50: CPT

## 2025-05-29 PROCEDURE — 93971 EXTREMITY STUDY: CPT

## 2025-05-29 PROCEDURE — 97166 OT EVAL MOD COMPLEX 45 MIN: CPT

## 2025-05-29 PROCEDURE — 1200000000 HC SEMI PRIVATE

## 2025-05-29 PROCEDURE — 1200000003 HC TELEMETRY R&B

## 2025-05-29 PROCEDURE — 6360000002 HC RX W HCPCS

## 2025-05-29 RX ORDER — HYDROXYZINE HYDROCHLORIDE 50 MG/ML
25 INJECTION, SOLUTION INTRAMUSCULAR EVERY 6 HOURS PRN
Status: DISCONTINUED | OUTPATIENT
Start: 2025-05-29 | End: 2025-06-03 | Stop reason: HOSPADM

## 2025-05-29 RX ORDER — BISACODYL 10 MG
10 SUPPOSITORY, RECTAL RECTAL ONCE
Status: COMPLETED | OUTPATIENT
Start: 2025-05-29 | End: 2025-05-29

## 2025-05-29 RX ORDER — ENOXAPARIN SODIUM 100 MG/ML
30 INJECTION SUBCUTANEOUS DAILY
Status: DISCONTINUED | OUTPATIENT
Start: 2025-05-30 | End: 2025-06-03 | Stop reason: HOSPADM

## 2025-05-29 RX ORDER — HYDRALAZINE HYDROCHLORIDE 20 MG/ML
5 INJECTION INTRAMUSCULAR; INTRAVENOUS EVERY 6 HOURS PRN
Status: DISCONTINUED | OUTPATIENT
Start: 2025-05-29 | End: 2025-05-31

## 2025-05-29 RX ADMIN — POTASSIUM CHLORIDE 10 MEQ: 1500 TABLET, EXTENDED RELEASE ORAL at 10:05

## 2025-05-29 RX ADMIN — CLOPIDOGREL BISULFATE 75 MG: 75 TABLET, FILM COATED ORAL at 10:05

## 2025-05-29 RX ADMIN — BISACODYL 10 MG: 10 SUPPOSITORY RECTAL at 04:59

## 2025-05-29 RX ADMIN — POLYETHYLENE GLYCOL 3350 17 G: 17 POWDER, FOR SOLUTION ORAL at 10:11

## 2025-05-29 RX ADMIN — Medication 1000 MG: at 10:05

## 2025-05-29 RX ADMIN — ATORVASTATIN CALCIUM 80 MG: 80 TABLET, FILM COATED ORAL at 20:14

## 2025-05-29 RX ADMIN — SODIUM CHLORIDE, PRESERVATIVE FREE 10 ML: 5 INJECTION INTRAVENOUS at 10:06

## 2025-05-29 RX ADMIN — METOPROLOL TARTRATE 75 MG: 50 TABLET, FILM COATED ORAL at 20:14

## 2025-05-29 RX ADMIN — DILTIAZEM HYDROCHLORIDE 120 MG: 120 CAPSULE, EXTENDED RELEASE ORAL at 10:05

## 2025-05-29 RX ADMIN — ASPIRIN 325 MG: 325 TABLET, COATED ORAL at 10:05

## 2025-05-29 RX ADMIN — SODIUM CHLORIDE, PRESERVATIVE FREE 10 ML: 5 INJECTION INTRAVENOUS at 20:15

## 2025-05-29 RX ADMIN — LATANOPROST 1 DROP: 50 SOLUTION OPHTHALMIC at 20:15

## 2025-05-29 RX ADMIN — FAMOTIDINE 20 MG: 20 TABLET, FILM COATED ORAL at 10:05

## 2025-05-29 RX ADMIN — METOPROLOL TARTRATE 75 MG: 50 TABLET, FILM COATED ORAL at 10:05

## 2025-05-29 RX ADMIN — Medication 1 TABLET: at 10:05

## 2025-05-29 RX ADMIN — HYALURONIDASE (HUMAN RECOMBINANT) 150 UNITS: 150 INJECTION, SOLUTION SUBCUTANEOUS at 03:17

## 2025-05-29 RX ADMIN — ENOXAPARIN SODIUM 40 MG: 100 INJECTION SUBCUTANEOUS at 10:05

## 2025-05-29 RX ADMIN — BUMETANIDE 1 MG: 1 TABLET ORAL at 10:05

## 2025-05-29 RX ADMIN — HYDROXYZINE HYDROCHLORIDE 25 MG: 50 INJECTION, SOLUTION INTRAMUSCULAR at 00:58

## 2025-05-29 RX ADMIN — Medication 6 MG: at 20:14

## 2025-05-29 ASSESSMENT — PAIN SCALES - GENERAL
PAINLEVEL_OUTOF10: 0

## 2025-05-29 ASSESSMENT — PAIN SCALES - WONG BAKER
WONGBAKER_NUMERICALRESPONSE: NO HURT
WONGBAKER_NUMERICALRESPONSE: NO HURT

## 2025-05-29 NOTE — ACP (ADVANCE CARE PLANNING)
Advance Care Planning     Advance Care Planning Inpatient Note  [facility] Department    Today's Date: 5/29/2025  Unit: [facility]    Received request from IDT Member to retrieve patient's previously completed ACP documents. No patient contact at this time.     Health Care Decision Makers:      Primary Decision Maker: RubyNba - Child - [phone]    Secondary Decision Maker: TonioBiju kim - Child - [phone]  Summary:  Updated Healthcare Decision Maker    Healthcare Power of /Advance Directive Appointment of Health Care Agent  Living Will/Advance Directive     Assessment:   received request to retrieve patient's previously completed ACP documents.  checked documents for completion, updated patient's HCPOA information in EPIC, and sent copies to Medical records.  team will remain available to support patient/family, PRN.       Interventions:  Retrieved previously completed ACP documents. No patient interventions at this time.    Care Preferences Communicated:   No    Outcomes/Plan:  Copy of advance directive given to staff to scan into medical record.    Electronically signed by Chaplain Ramón on 5/29/2025 at 1:31 PM

## 2025-05-29 NOTE — SIGNIFICANT EVENT
Notified by primary RN of changes in appearance of RUE.    Care coordinated with overnight resident physician from 05/28/2025. Patient was previously started on amiodarone infusion secondary to atrial fibrillation with rapid ventricular response, complicated by line infiltration  for which infusion was discontinued. At that time, no changes in the appearance of the RUE were noted by overnight resident physician.     Care coordinated with inpatient pharmacist regarding the indication for hyaluronidase.     MDM:  Heat/compress application to RUE  Vascular duplex of the RUE  In addition to supportive care, decision made for one time administration of hyaluronidase.  Continue to monitor the RUE.     Electronically signed by Rene Dorsey MD on 5/29/25 at 4:06 AM EDT

## 2025-05-30 PROBLEM — I63.9 ACUTE CVA (CEREBROVASCULAR ACCIDENT) (HCC): Status: ACTIVE | Noted: 2025-05-30

## 2025-05-30 PROBLEM — N30.00 ACUTE CYSTITIS WITHOUT HEMATURIA: Status: ACTIVE | Noted: 2025-05-30

## 2025-05-30 LAB
ANION GAP SERPL CALC-SCNC: 14 MEQ/L (ref 8–16)
BACTERIA URNS QL MICRO: ABNORMAL /HPF
BILIRUB UR QL STRIP.AUTO: NEGATIVE
BUN SERPL-MCNC: 30 MG/DL (ref 8–23)
CA-I BLD ISE-SCNC: 1.11 MMOL/L (ref 1.12–1.32)
CALCIUM SERPL-MCNC: 9.2 MG/DL (ref 8.8–10.2)
CASTS #/AREA URNS LPF: ABNORMAL /LPF
CASTS 2: ABNORMAL /LPF
CHARACTER UR: ABNORMAL
CHLORIDE SERPL-SCNC: 104 MEQ/L (ref 98–111)
CO2 SERPL-SCNC: 20 MEQ/L (ref 22–29)
COLOR, UA: ABNORMAL
CREAT SERPL-MCNC: 1.2 MG/DL (ref 0.5–0.9)
CRYSTALS URNS MICRO: ABNORMAL
DEPRECATED RDW RBC AUTO: 53.5 FL (ref 35–45)
EPITHELIAL CELLS, UA: ABNORMAL /HPF
ERYTHROCYTE [DISTWIDTH] IN BLOOD BY AUTOMATED COUNT: 16.7 % (ref 11.5–14.5)
GFR SERPL CREATININE-BSD FRML MDRD: 43 ML/MIN/1.73M2
GLUCOSE SERPL-MCNC: 111 MG/DL (ref 74–109)
GLUCOSE UR QL STRIP.AUTO: NEGATIVE MG/DL
HCT VFR BLD AUTO: 42.5 % (ref 37–47)
HGB BLD-MCNC: 13.9 GM/DL (ref 12–16)
HGB UR QL STRIP.AUTO: ABNORMAL
KETONES UR QL STRIP.AUTO: ABNORMAL
MAGNESIUM SERPL-MCNC: 2.3 MG/DL (ref 1.6–2.6)
MCH RBC QN AUTO: 28.7 PG (ref 26–33)
MCHC RBC AUTO-ENTMCNC: 32.7 GM/DL (ref 32.2–35.5)
MCV RBC AUTO: 87.8 FL (ref 81–99)
MISCELLANEOUS 2: ABNORMAL
NITRITE UR QL STRIP: NEGATIVE
PH UR STRIP.AUTO: 8.5 [PH] (ref 5–9)
PLATELET # BLD AUTO: 183 THOU/MM3 (ref 130–400)
PMV BLD AUTO: 9.9 FL (ref 9.4–12.4)
POTASSIUM SERPL-SCNC: 3.7 MEQ/L (ref 3.5–5.2)
PROT UR STRIP.AUTO-MCNC: 300 MG/DL
RBC # BLD AUTO: 4.84 MILL/MM3 (ref 4.2–5.4)
RBC URINE: ABNORMAL /HPF
RENAL EPI CELLS #/AREA URNS HPF: ABNORMAL /[HPF]
SODIUM SERPL-SCNC: 138 MEQ/L (ref 135–145)
SP GR UR REFRACT.AUTO: 1.02 (ref 1–1.03)
UROBILINOGEN, URINE: 1 EU/DL (ref 0–1)
WBC # BLD AUTO: 7.2 THOU/MM3 (ref 4.8–10.8)
WBC #/AREA URNS HPF: > 200 /HPF
WBC #/AREA URNS HPF: ABNORMAL /[HPF]
YEAST LIKE FUNGI URNS QL MICRO: ABNORMAL

## 2025-05-30 PROCEDURE — 99232 SBSQ HOSP IP/OBS MODERATE 35: CPT

## 2025-05-30 PROCEDURE — 2500000003 HC RX 250 WO HCPCS: Performed by: STUDENT IN AN ORGANIZED HEALTH CARE EDUCATION/TRAINING PROGRAM

## 2025-05-30 PROCEDURE — 87186 SC STD MICRODIL/AGAR DIL: CPT

## 2025-05-30 PROCEDURE — 6370000000 HC RX 637 (ALT 250 FOR IP)

## 2025-05-30 PROCEDURE — 6360000002 HC RX W HCPCS

## 2025-05-30 PROCEDURE — 85027 COMPLETE CBC AUTOMATED: CPT

## 2025-05-30 PROCEDURE — 80048 BASIC METABOLIC PNL TOTAL CA: CPT

## 2025-05-30 PROCEDURE — 82330 ASSAY OF CALCIUM: CPT

## 2025-05-30 PROCEDURE — 99222 1ST HOSP IP/OBS MODERATE 55: CPT | Performed by: STUDENT IN AN ORGANIZED HEALTH CARE EDUCATION/TRAINING PROGRAM

## 2025-05-30 PROCEDURE — 87086 URINE CULTURE/COLONY COUNT: CPT

## 2025-05-30 PROCEDURE — 1200000003 HC TELEMETRY R&B

## 2025-05-30 PROCEDURE — 6360000002 HC RX W HCPCS: Performed by: STUDENT IN AN ORGANIZED HEALTH CARE EDUCATION/TRAINING PROGRAM

## 2025-05-30 PROCEDURE — 81001 URINALYSIS AUTO W/SCOPE: CPT

## 2025-05-30 PROCEDURE — 97535 SELF CARE MNGMENT TRAINING: CPT

## 2025-05-30 PROCEDURE — 36415 COLL VENOUS BLD VENIPUNCTURE: CPT

## 2025-05-30 PROCEDURE — 2500000003 HC RX 250 WO HCPCS

## 2025-05-30 PROCEDURE — 87077 CULTURE AEROBIC IDENTIFY: CPT

## 2025-05-30 PROCEDURE — 6370000000 HC RX 637 (ALT 250 FOR IP): Performed by: STUDENT IN AN ORGANIZED HEALTH CARE EDUCATION/TRAINING PROGRAM

## 2025-05-30 PROCEDURE — 83735 ASSAY OF MAGNESIUM: CPT

## 2025-05-30 RX ORDER — DOCUSATE SODIUM 100 MG/1
100 CAPSULE, LIQUID FILLED ORAL DAILY
Status: DISCONTINUED | OUTPATIENT
Start: 2025-05-30 | End: 2025-06-03 | Stop reason: HOSPADM

## 2025-05-30 RX ORDER — SENNOSIDES 8.6 MG/1
1 TABLET ORAL NIGHTLY
Status: DISCONTINUED | OUTPATIENT
Start: 2025-05-30 | End: 2025-06-03 | Stop reason: HOSPADM

## 2025-05-30 RX ADMIN — DOCUSATE SODIUM 100 MG: 100 CAPSULE, LIQUID FILLED ORAL at 18:15

## 2025-05-30 RX ADMIN — METOPROLOL TARTRATE 75 MG: 50 TABLET, FILM COATED ORAL at 20:14

## 2025-05-30 RX ADMIN — METOPROLOL TARTRATE 75 MG: 50 TABLET, FILM COATED ORAL at 09:37

## 2025-05-30 RX ADMIN — Medication 1000 MG: at 09:37

## 2025-05-30 RX ADMIN — CLOPIDOGREL BISULFATE 75 MG: 75 TABLET, FILM COATED ORAL at 09:38

## 2025-05-30 RX ADMIN — TRIAMCINOLONE ACETONIDE: 1 CREAM TOPICAL at 09:38

## 2025-05-30 RX ADMIN — ENOXAPARIN SODIUM 30 MG: 100 INJECTION SUBCUTANEOUS at 09:37

## 2025-05-30 RX ADMIN — FAMOTIDINE 20 MG: 20 TABLET, FILM COATED ORAL at 09:37

## 2025-05-30 RX ADMIN — WATER 1000 MG: 1 INJECTION INTRAMUSCULAR; INTRAVENOUS; SUBCUTANEOUS at 14:17

## 2025-05-30 RX ADMIN — TRIAMCINOLONE ACETONIDE: 1 CREAM TOPICAL at 20:13

## 2025-05-30 RX ADMIN — SENNOSIDES 8.6 MG: 8.6 TABLET, FILM COATED ORAL at 18:15

## 2025-05-30 RX ADMIN — POTASSIUM CHLORIDE 10 MEQ: 1500 TABLET, EXTENDED RELEASE ORAL at 09:37

## 2025-05-30 RX ADMIN — ATORVASTATIN CALCIUM 80 MG: 80 TABLET, FILM COATED ORAL at 20:14

## 2025-05-30 RX ADMIN — SODIUM CHLORIDE, PRESERVATIVE FREE 10 ML: 5 INJECTION INTRAVENOUS at 09:37

## 2025-05-30 RX ADMIN — SODIUM CHLORIDE, PRESERVATIVE FREE 10 ML: 5 INJECTION INTRAVENOUS at 20:14

## 2025-05-30 RX ADMIN — Medication 1 TABLET: at 09:37

## 2025-05-30 RX ADMIN — BUMETANIDE 1 MG: 1 TABLET ORAL at 09:37

## 2025-05-30 RX ADMIN — DILTIAZEM HYDROCHLORIDE 120 MG: 120 CAPSULE, EXTENDED RELEASE ORAL at 09:38

## 2025-05-30 RX ADMIN — LATANOPROST 1 DROP: 50 SOLUTION OPHTHALMIC at 20:13

## 2025-05-30 RX ADMIN — ASPIRIN 325 MG: 325 TABLET, COATED ORAL at 09:37

## 2025-05-30 ASSESSMENT — PAIN SCALES - WONG BAKER: WONGBAKER_NUMERICALRESPONSE: NO HURT

## 2025-05-30 ASSESSMENT — PAIN SCALES - GENERAL
PAINLEVEL_OUTOF10: 0

## 2025-05-31 PROCEDURE — 6360000002 HC RX W HCPCS

## 2025-05-31 PROCEDURE — 2500000003 HC RX 250 WO HCPCS

## 2025-05-31 PROCEDURE — 6360000002 HC RX W HCPCS: Performed by: STUDENT IN AN ORGANIZED HEALTH CARE EDUCATION/TRAINING PROGRAM

## 2025-05-31 PROCEDURE — 6370000000 HC RX 637 (ALT 250 FOR IP): Performed by: STUDENT IN AN ORGANIZED HEALTH CARE EDUCATION/TRAINING PROGRAM

## 2025-05-31 PROCEDURE — 99232 SBSQ HOSP IP/OBS MODERATE 35: CPT

## 2025-05-31 PROCEDURE — 2500000003 HC RX 250 WO HCPCS: Performed by: STUDENT IN AN ORGANIZED HEALTH CARE EDUCATION/TRAINING PROGRAM

## 2025-05-31 PROCEDURE — 6370000000 HC RX 637 (ALT 250 FOR IP)

## 2025-05-31 PROCEDURE — 2580000003 HC RX 258

## 2025-05-31 PROCEDURE — 1200000000 HC SEMI PRIVATE

## 2025-05-31 RX ORDER — SODIUM CHLORIDE, SODIUM LACTATE, POTASSIUM CHLORIDE, CALCIUM CHLORIDE 600; 310; 30; 20 MG/100ML; MG/100ML; MG/100ML; MG/100ML
INJECTION, SOLUTION INTRAVENOUS CONTINUOUS
Status: ACTIVE | OUTPATIENT
Start: 2025-05-31 | End: 2025-05-31

## 2025-05-31 RX ADMIN — POTASSIUM CHLORIDE 10 MEQ: 1500 TABLET, EXTENDED RELEASE ORAL at 07:54

## 2025-05-31 RX ADMIN — METOPROLOL TARTRATE 75 MG: 50 TABLET, FILM COATED ORAL at 19:33

## 2025-05-31 RX ADMIN — FAMOTIDINE 20 MG: 20 TABLET, FILM COATED ORAL at 07:53

## 2025-05-31 RX ADMIN — TRIAMCINOLONE ACETONIDE: 1 CREAM TOPICAL at 07:54

## 2025-05-31 RX ADMIN — SODIUM CHLORIDE, SODIUM LACTATE, POTASSIUM CHLORIDE, AND CALCIUM CHLORIDE: .6; .31; .03; .02 INJECTION, SOLUTION INTRAVENOUS at 09:58

## 2025-05-31 RX ADMIN — WATER 1000 MG: 1 INJECTION INTRAMUSCULAR; INTRAVENOUS; SUBCUTANEOUS at 14:37

## 2025-05-31 RX ADMIN — ATORVASTATIN CALCIUM 80 MG: 80 TABLET, FILM COATED ORAL at 19:34

## 2025-05-31 RX ADMIN — LATANOPROST 1 DROP: 50 SOLUTION OPHTHALMIC at 19:33

## 2025-05-31 RX ADMIN — SENNOSIDES 8.6 MG: 8.6 TABLET, FILM COATED ORAL at 19:34

## 2025-05-31 RX ADMIN — Medication 1 TABLET: at 07:54

## 2025-05-31 RX ADMIN — DILTIAZEM HYDROCHLORIDE 120 MG: 120 CAPSULE, EXTENDED RELEASE ORAL at 07:53

## 2025-05-31 RX ADMIN — Medication 1000 MG: at 07:53

## 2025-05-31 RX ADMIN — BUMETANIDE 1 MG: 1 TABLET ORAL at 07:54

## 2025-05-31 RX ADMIN — ASPIRIN 325 MG: 325 TABLET, COATED ORAL at 07:54

## 2025-05-31 RX ADMIN — SODIUM CHLORIDE, PRESERVATIVE FREE 10 ML: 5 INJECTION INTRAVENOUS at 07:54

## 2025-05-31 RX ADMIN — CLOPIDOGREL BISULFATE 75 MG: 75 TABLET, FILM COATED ORAL at 07:53

## 2025-05-31 RX ADMIN — METOPROLOL TARTRATE 75 MG: 50 TABLET, FILM COATED ORAL at 07:54

## 2025-05-31 RX ADMIN — TRIAMCINOLONE ACETONIDE: 1 CREAM TOPICAL at 19:33

## 2025-05-31 RX ADMIN — DOCUSATE SODIUM 100 MG: 100 CAPSULE, LIQUID FILLED ORAL at 07:53

## 2025-05-31 ASSESSMENT — PAIN SCALES - GENERAL: PAINLEVEL_OUTOF10: 0

## 2025-06-01 LAB
ANION GAP SERPL CALC-SCNC: 13 MEQ/L (ref 8–16)
BACTERIA UR CULT: ABNORMAL
BUN SERPL-MCNC: 33 MG/DL (ref 8–23)
CALCIUM SERPL-MCNC: 9 MG/DL (ref 8.8–10.2)
CHLORIDE SERPL-SCNC: 106 MEQ/L (ref 98–111)
CO2 SERPL-SCNC: 22 MEQ/L (ref 22–29)
CREAT SERPL-MCNC: 1.2 MG/DL (ref 0.5–0.9)
GFR SERPL CREATININE-BSD FRML MDRD: 43 ML/MIN/1.73M2
GLUCOSE SERPL-MCNC: 119 MG/DL (ref 74–109)
ORGANISM: ABNORMAL
POTASSIUM SERPL-SCNC: 3.7 MEQ/L (ref 3.5–5.2)
SODIUM SERPL-SCNC: 141 MEQ/L (ref 135–145)

## 2025-06-01 PROCEDURE — 6360000002 HC RX W HCPCS

## 2025-06-01 PROCEDURE — 80048 BASIC METABOLIC PNL TOTAL CA: CPT

## 2025-06-01 PROCEDURE — 2500000003 HC RX 250 WO HCPCS: Performed by: STUDENT IN AN ORGANIZED HEALTH CARE EDUCATION/TRAINING PROGRAM

## 2025-06-01 PROCEDURE — 6370000000 HC RX 637 (ALT 250 FOR IP): Performed by: STUDENT IN AN ORGANIZED HEALTH CARE EDUCATION/TRAINING PROGRAM

## 2025-06-01 PROCEDURE — 6370000000 HC RX 637 (ALT 250 FOR IP)

## 2025-06-01 PROCEDURE — 6360000002 HC RX W HCPCS: Performed by: STUDENT IN AN ORGANIZED HEALTH CARE EDUCATION/TRAINING PROGRAM

## 2025-06-01 PROCEDURE — 1200000000 HC SEMI PRIVATE

## 2025-06-01 PROCEDURE — 2500000003 HC RX 250 WO HCPCS

## 2025-06-01 PROCEDURE — 36415 COLL VENOUS BLD VENIPUNCTURE: CPT

## 2025-06-01 RX ORDER — POLYETHYLENE GLYCOL 3350 17 G/17G
17 POWDER, FOR SOLUTION ORAL DAILY
Status: DISCONTINUED | OUTPATIENT
Start: 2025-06-01 | End: 2025-06-03 | Stop reason: HOSPADM

## 2025-06-01 RX ADMIN — METOPROLOL TARTRATE 75 MG: 50 TABLET, FILM COATED ORAL at 08:37

## 2025-06-01 RX ADMIN — DOCUSATE SODIUM 100 MG: 100 CAPSULE, LIQUID FILLED ORAL at 08:37

## 2025-06-01 RX ADMIN — FAMOTIDINE 20 MG: 20 TABLET, FILM COATED ORAL at 08:36

## 2025-06-01 RX ADMIN — SODIUM CHLORIDE, PRESERVATIVE FREE 10 ML: 5 INJECTION INTRAVENOUS at 21:47

## 2025-06-01 RX ADMIN — ENOXAPARIN SODIUM 30 MG: 100 INJECTION SUBCUTANEOUS at 08:36

## 2025-06-01 RX ADMIN — WATER 1000 MG: 1 INJECTION INTRAMUSCULAR; INTRAVENOUS; SUBCUTANEOUS at 17:39

## 2025-06-01 RX ADMIN — ASPIRIN 325 MG: 325 TABLET, COATED ORAL at 08:36

## 2025-06-01 RX ADMIN — DILTIAZEM HYDROCHLORIDE 120 MG: 120 CAPSULE, EXTENDED RELEASE ORAL at 08:36

## 2025-06-01 RX ADMIN — Medication 1000 MG: at 08:36

## 2025-06-01 RX ADMIN — ATORVASTATIN CALCIUM 80 MG: 80 TABLET, FILM COATED ORAL at 21:46

## 2025-06-01 RX ADMIN — TRIAMCINOLONE ACETONIDE: 1 CREAM TOPICAL at 21:47

## 2025-06-01 RX ADMIN — CLOPIDOGREL BISULFATE 75 MG: 75 TABLET, FILM COATED ORAL at 08:36

## 2025-06-01 RX ADMIN — METOPROLOL TARTRATE 75 MG: 50 TABLET, FILM COATED ORAL at 21:46

## 2025-06-01 RX ADMIN — POTASSIUM CHLORIDE 10 MEQ: 1500 TABLET, EXTENDED RELEASE ORAL at 08:36

## 2025-06-01 RX ADMIN — SODIUM CHLORIDE, PRESERVATIVE FREE 10 ML: 5 INJECTION INTRAVENOUS at 08:37

## 2025-06-01 RX ADMIN — Medication 1 TABLET: at 08:37

## 2025-06-01 RX ADMIN — SENNOSIDES 8.6 MG: 8.6 TABLET, FILM COATED ORAL at 21:46

## 2025-06-01 RX ADMIN — TRIAMCINOLONE ACETONIDE: 1 CREAM TOPICAL at 08:37

## 2025-06-01 RX ADMIN — LATANOPROST 1 DROP: 50 SOLUTION OPHTHALMIC at 21:47

## 2025-06-02 LAB
ANION GAP SERPL CALC-SCNC: 12 MEQ/L (ref 8–16)
BASOPHILS ABSOLUTE: 0 THOU/MM3 (ref 0–0.1)
BASOPHILS NFR BLD AUTO: 0.8 %
BUN SERPL-MCNC: 26 MG/DL (ref 8–23)
CALCIUM SERPL-MCNC: 9.1 MG/DL (ref 8.8–10.2)
CHLORIDE SERPL-SCNC: 107 MEQ/L (ref 98–111)
CO2 SERPL-SCNC: 22 MEQ/L (ref 22–29)
CREAT SERPL-MCNC: 1 MG/DL (ref 0.5–0.9)
DEPRECATED RDW RBC AUTO: 52.7 FL (ref 35–45)
EOSINOPHIL NFR BLD AUTO: 3.8 %
EOSINOPHILS ABSOLUTE: 0.2 THOU/MM3 (ref 0–0.4)
ERYTHROCYTE [DISTWIDTH] IN BLOOD BY AUTOMATED COUNT: 16.3 % (ref 11.5–14.5)
GFR SERPL CREATININE-BSD FRML MDRD: 54 ML/MIN/1.73M2
GLUCOSE BLD STRIP.AUTO-MCNC: 104 MG/DL (ref 70–108)
GLUCOSE SERPL-MCNC: 109 MG/DL (ref 74–109)
HCT VFR BLD AUTO: 39.4 % (ref 37–47)
HGB BLD-MCNC: 12.7 GM/DL (ref 12–16)
IMM GRANULOCYTES # BLD AUTO: 0.02 THOU/MM3 (ref 0–0.07)
IMM GRANULOCYTES NFR BLD AUTO: 0.4 %
LYMPHOCYTES ABSOLUTE: 0.6 THOU/MM3 (ref 1–4.8)
LYMPHOCYTES NFR BLD AUTO: 10.9 %
MCH RBC QN AUTO: 28.3 PG (ref 26–33)
MCHC RBC AUTO-ENTMCNC: 32.2 GM/DL (ref 32.2–35.5)
MCV RBC AUTO: 87.8 FL (ref 81–99)
MONOCYTES ABSOLUTE: 0.5 THOU/MM3 (ref 0.4–1.3)
MONOCYTES NFR BLD AUTO: 9 %
NEUTROPHILS ABSOLUTE: 3.9 THOU/MM3 (ref 1.8–7.7)
NEUTROPHILS NFR BLD AUTO: 75.1 %
NRBC BLD AUTO-RTO: 0 /100 WBC
PLATELET # BLD AUTO: 221 THOU/MM3 (ref 130–400)
PMV BLD AUTO: 10.4 FL (ref 9.4–12.4)
POTASSIUM SERPL-SCNC: 3.7 MEQ/L (ref 3.5–5.2)
RBC # BLD AUTO: 4.49 MILL/MM3 (ref 4.2–5.4)
SODIUM SERPL-SCNC: 141 MEQ/L (ref 135–145)
WBC # BLD AUTO: 5.2 THOU/MM3 (ref 4.8–10.8)

## 2025-06-02 PROCEDURE — 2500000003 HC RX 250 WO HCPCS: Performed by: STUDENT IN AN ORGANIZED HEALTH CARE EDUCATION/TRAINING PROGRAM

## 2025-06-02 PROCEDURE — 6360000002 HC RX W HCPCS: Performed by: STUDENT IN AN ORGANIZED HEALTH CARE EDUCATION/TRAINING PROGRAM

## 2025-06-02 PROCEDURE — 6370000000 HC RX 637 (ALT 250 FOR IP): Performed by: STUDENT IN AN ORGANIZED HEALTH CARE EDUCATION/TRAINING PROGRAM

## 2025-06-02 PROCEDURE — 36415 COLL VENOUS BLD VENIPUNCTURE: CPT

## 2025-06-02 PROCEDURE — 80048 BASIC METABOLIC PNL TOTAL CA: CPT

## 2025-06-02 PROCEDURE — 94669 MECHANICAL CHEST WALL OSCILL: CPT

## 2025-06-02 PROCEDURE — 85025 COMPLETE CBC W/AUTO DIFF WBC: CPT

## 2025-06-02 PROCEDURE — 1200000000 HC SEMI PRIVATE

## 2025-06-02 PROCEDURE — 97110 THERAPEUTIC EXERCISES: CPT

## 2025-06-02 PROCEDURE — 97535 SELF CARE MNGMENT TRAINING: CPT

## 2025-06-02 PROCEDURE — 97530 THERAPEUTIC ACTIVITIES: CPT

## 2025-06-02 PROCEDURE — 6370000000 HC RX 637 (ALT 250 FOR IP)

## 2025-06-02 PROCEDURE — 97116 GAIT TRAINING THERAPY: CPT

## 2025-06-02 PROCEDURE — 82948 REAGENT STRIP/BLOOD GLUCOSE: CPT

## 2025-06-02 RX ADMIN — ENOXAPARIN SODIUM 30 MG: 100 INJECTION SUBCUTANEOUS at 09:20

## 2025-06-02 RX ADMIN — METOPROLOL TARTRATE 75 MG: 50 TABLET, FILM COATED ORAL at 09:17

## 2025-06-02 RX ADMIN — SODIUM CHLORIDE, PRESERVATIVE FREE 10 ML: 5 INJECTION INTRAVENOUS at 09:25

## 2025-06-02 RX ADMIN — Medication 1000 MG: at 09:17

## 2025-06-02 RX ADMIN — LATANOPROST 1 DROP: 50 SOLUTION OPHTHALMIC at 22:07

## 2025-06-02 RX ADMIN — ATORVASTATIN CALCIUM 80 MG: 80 TABLET, FILM COATED ORAL at 20:34

## 2025-06-02 RX ADMIN — Medication 1 TABLET: at 09:17

## 2025-06-02 RX ADMIN — DOCUSATE SODIUM 100 MG: 100 CAPSULE, LIQUID FILLED ORAL at 09:17

## 2025-06-02 RX ADMIN — METOPROLOL TARTRATE 75 MG: 50 TABLET, FILM COATED ORAL at 20:34

## 2025-06-02 RX ADMIN — POLYETHYLENE GLYCOL 3350 17 G: 17 POWDER, FOR SOLUTION ORAL at 09:17

## 2025-06-02 RX ADMIN — DILTIAZEM HYDROCHLORIDE 120 MG: 120 CAPSULE, EXTENDED RELEASE ORAL at 09:17

## 2025-06-02 RX ADMIN — FAMOTIDINE 20 MG: 20 TABLET, FILM COATED ORAL at 09:17

## 2025-06-02 RX ADMIN — POTASSIUM CHLORIDE 10 MEQ: 1500 TABLET, EXTENDED RELEASE ORAL at 09:17

## 2025-06-02 RX ADMIN — SODIUM CHLORIDE, PRESERVATIVE FREE 10 ML: 5 INJECTION INTRAVENOUS at 20:45

## 2025-06-02 RX ADMIN — SENNOSIDES 8.6 MG: 8.6 TABLET, FILM COATED ORAL at 20:34

## 2025-06-02 RX ADMIN — CLOPIDOGREL BISULFATE 75 MG: 75 TABLET, FILM COATED ORAL at 09:17

## 2025-06-02 RX ADMIN — TRIAMCINOLONE ACETONIDE: 1 CREAM TOPICAL at 09:25

## 2025-06-02 RX ADMIN — ASPIRIN 325 MG: 325 TABLET, COATED ORAL at 09:17

## 2025-06-02 NOTE — H&P
Insurance company requesting updated clinicals be faxed today including updated therapy notes.  Therapy team sent request to see patient as priority for updates for precert submission.   
evaluation.  Paroxysmal atrial fibrillation status post [facility] on 9/26/24: In atrial fibrillation on admission, initially was slightly tachycardic at 113 but now rate controlled.  Currently holding home metoprolol and diltiazem due to permissive hypertension, can be resumed after 24 hours.  Monitor on telemetry.  Hypertension: Holding home antihypertensives to allow for permissive hypertension.  Can be resumed after 24 hours.  Hyperlipidemia: Continue home fish oil.  Started on high intensity statin.  Lipid panel pending.  GERD: Continue on famotidine  Macular degeneration/chronic right eye blindness: She can not see out of right eye which is chronic for her since 5 years, it has not changed.  Continue home AREDS supplement and ophthalmic drops.  Vitamin D deficiency: Continue home vitamin D supplementation.  Chronic HFpEF/diastolic heart failure: No signs or symptoms of exacerbation.  Chest x-ray at OSH was negative for acute findings.  Home diuretic currently on hold due to permissive hypertension.  Resume after 24 hours.  Strict intake and output.  Daily weights.  Fluid and salt restriction with diet as ordered.  Hypokalemia: Likely secondary to diuretics.  Since currently holding diuretics, will hold potassium supplementation as well.  Potassium within normal limit on admission.  Will monitor.      History Of Present Illness:    Ms. Lulu Werner is a 89 y.o. 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    She could not move her right leg and arm this morning about 7:30-8 am. At 7 am she was fine, she went to bathroom and laid down. She can not see out of right eye which is chronic for her since 5 years, it has not changed. She had numbness on right side earlier which has resolved now.  Her right sided weakness is improving as well.  No fever or chills. No chest pain or SOB. No abdominal pain, nausea or vomiting. No issues with bm. No issues with

## 2025-06-02 NOTE — PLAN OF CARE
Problem: Chronic Conditions and Co-morbidities  Goal: Patient's chronic conditions and co-morbidity symptoms are monitored and maintained or improved  5/29/2025 0939 by Keyshawn Marin, RN  Outcome: Progressing  Flowsheets (Taken 5/29/2025 0939)  Care Plan - Patient's Chronic Conditions and Co-Morbidity Symptoms are Monitored and Maintained or Improved:   Monitor and assess patient's chronic conditions and comorbid symptoms for stability, deterioration, or improvement   Collaborate with multidisciplinary team to address chronic and comorbid conditions and prevent exacerbation or deterioration   Update acute care plan with appropriate goals if chronic or comorbid symptoms are exacerbated and prevent overall improvement and discharge     Problem: Discharge Planning  Goal: Discharge to home or other facility with appropriate resources  5/29/2025 0939 by Keyshawn Marin, RN  Outcome: Progressing  Flowsheets (Taken 5/29/2025 0939)  Discharge to home or other facility with appropriate resources:   Identify barriers to discharge with patient and caregiver   Arrange for needed discharge resources and transportation as appropriate   Identify discharge learning needs (meds, wound care, etc)   Refer to discharge planning if patient needs post-hospital services based on physician order or complex needs related to functional status, cognitive ability or social support system     Problem: ABCDS Injury Assessment  Goal: Absence of physical injury  5/29/2025 0939 by Keyshawn Marin, RN  Outcome: Progressing  Flowsheets (Taken 5/29/2025 0939)  Absence of Physical Injury: Implement safety measures based on patient assessment     Problem: Safety - Adult  Goal: Free from fall injury  5/29/2025 0939 by Keyshawn Marin, RN  Outcome: Progressing  Flowsheets (Taken 5/29/2025 0939)  Free From Fall Injury:   Instruct family/caregiver on patient safety   Based on caregiver fall risk screen, instruct family/caregiver to ask for assistance 
  Problem: Chronic Conditions and Co-morbidities  Goal: Patient's chronic conditions and co-morbidity symptoms are monitored and maintained or improved  6/1/2025 0019 by Faby Cordova RN  Outcome: Progressing  Flowsheets (Taken 5/31/2025 1915)  Care Plan - Patient's Chronic Conditions and Co-Morbidity Symptoms are Monitored and Maintained or Improved: Monitor and assess patient's chronic conditions and comorbid symptoms for stability, deterioration, or improvement     Problem: Discharge Planning  Goal: Discharge to home or other facility with appropriate resources  6/1/2025 0019 by Faby Cordova, RN  Outcome: Progressing  Flowsheets (Taken 5/31/2025 1915)  Discharge to home or other facility with appropriate resources: Identify barriers to discharge with patient and caregiver     Problem: ABCDS Injury Assessment  Goal: Absence of physical injury  6/1/2025 0019 by Faby Cordova, RN  Outcome: Progressing     Problem: Safety - Adult  Goal: Free from fall injury  6/1/2025 0019 by Faby Cordova, RN  Outcome: Progressing   Fall assessment completed. Patient using call light appropriately to call for assistance with ambulation to bathroom.  Personal items within reach. Patient is also compliant with use of non-skid slippers.  Placed bed in low position. Bed alarm turned on    Problem: Neurosensory - Adult  Goal: Achieves stable or improved neurological status  6/1/2025 0019 by Faby Cordova, RN  Outcome: Progressing  Flowsheets (Taken 5/31/2025 1915)  Achieves stable or improved neurological status:   Assess for and report changes in neurological status   Initiate measures to prevent increased intracranial pressure     Problem: Neurosensory - Adult  Goal: Achieves maximal functionality and self care  6/1/2025 0019 by Faby Cordova, RN  Outcome: Progressing  Flowsheets (Taken 5/31/2025 1915)  Achieves maximal functionality and self care: Monitor swallowing and airway patency with patient fatigue and changes in 
  Problem: Chronic Conditions and Co-morbidities  Goal: Patient's chronic conditions and co-morbidity symptoms are monitored and maintained or improved  Outcome: Progressing  Flowsheets (Taken 5/26/2025 1510)  Care Plan - Patient's Chronic Conditions and Co-Morbidity Symptoms are Monitored and Maintained or Improved:   Collaborate with multidisciplinary team to address chronic and comorbid conditions and prevent exacerbation or deterioration   Update acute care plan with appropriate goals if chronic or comorbid symptoms are exacerbated and prevent overall improvement and discharge   Monitor and assess patient's chronic conditions and comorbid symptoms for stability, deterioration, or improvement     Problem: Discharge Planning  Goal: Discharge to home or other facility with appropriate resources  Outcome: Progressing  Flowsheets (Taken 5/26/2025 1510)  Discharge to home or other facility with appropriate resources:   Identify barriers to discharge with patient and caregiver   Arrange for needed discharge resources and transportation as appropriate   Identify discharge learning needs (meds, wound care, etc)   Refer to discharge planning if patient needs post-hospital services based on physician order or complex needs related to functional status, cognitive ability or social support system     Problem: ABCDS Injury Assessment  Goal: Absence of physical injury  Outcome: Progressing  Flowsheets (Taken 5/26/2025 1510)  Absence of Physical Injury: Implement safety measures based on patient assessment     Problem: Safety - Adult  Goal: Free from fall injury  Outcome: Progressing  Flowsheets (Taken 5/26/2025 1510)  Free From Fall Injury: Instruct family/caregiver on patient safety     Problem: Neurosensory - Adult  Goal: Achieves stable or improved neurological status  Outcome: Progressing  Flowsheets (Taken 5/26/2025 1510)  Achieves stable or improved neurological status:   Assess for and report changes in neurological 
  Problem: Chronic Conditions and Co-morbidities  Goal: Patient's chronic conditions and co-morbidity symptoms are monitored and maintained or improved  Outcome: Progressing  Flowsheets (Taken 5/26/2025 1510)  Care Plan - Patient's Chronic Conditions and Co-Morbidity Symptoms are Monitored and Maintained or Improved:   Collaborate with multidisciplinary team to address chronic and comorbid conditions and prevent exacerbation or deterioration   Update acute care plan with appropriate goals if chronic or comorbid symptoms are exacerbated and prevent overall improvement and discharge   Monitor and assess patient's chronic conditions and comorbid symptoms for stability, deterioration, or improvement     Problem: Discharge Planning  Goal: Discharge to home or other facility with appropriate resources  Outcome: Progressing  Flowsheets (Taken 5/26/2025 1510)  Discharge to home or other facility with appropriate resources:   Identify barriers to discharge with patient and caregiver   Arrange for needed discharge resources and transportation as appropriate   Identify discharge learning needs (meds, wound care, etc)   Refer to discharge planning if patient needs post-hospital services based on physician order or complex needs related to functional status, cognitive ability or social support system     Problem: ABCDS Injury Assessment  Goal: Absence of physical injury  Outcome: Progressing  Flowsheets (Taken 5/26/2025 1510)  Absence of Physical Injury: Implement safety measures based on patient assessment     Problem: Safety - Adult  Goal: Free from fall injury  Outcome: Progressing  Flowsheets (Taken 5/26/2025 1510)  Free From Fall Injury: Instruct family/caregiver on patient safety     Problem: Neurosensory - Adult  Goal: Achieves stable or improved neurological status  Outcome: Progressing  Flowsheets (Taken 5/26/2025 1510)  Achieves stable or improved neurological status:   Assess for and report changes in neurological 
  Problem: Chronic Conditions and Co-morbidities  Goal: Patient's chronic conditions and co-morbidity symptoms are monitored and maintained or improved  Outcome: Progressing  Flowsheets (Taken 5/27/2025 0750)  Care Plan - Patient's Chronic Conditions and Co-Morbidity Symptoms are Monitored and Maintained or Improved: Monitor and assess patient's chronic conditions and comorbid symptoms for stability, deterioration, or improvement     Problem: Discharge Planning  Goal: Discharge to home or other facility with appropriate resources  Outcome: Progressing  Flowsheets (Taken 5/27/2025 0750)  Discharge to home or other facility with appropriate resources: Identify barriers to discharge with patient and caregiver     Problem: ABCDS Injury Assessment  Goal: Absence of physical injury  Outcome: Progressing  Flowsheets (Taken 5/27/2025 1118)  Absence of Physical Injury: Implement safety measures based on patient assessment     Problem: Safety - Adult  Goal: Free from fall injury  Outcome: Progressing  Flowsheets (Taken 5/27/2025 1118)  Free From Fall Injury: Instruct family/caregiver on patient safety  Note: Patient remains free from falls this shift. Fall precautions in place with bed/chair exit alarmed. Fall sign posted and fall armband in place. Nonskid footwear used with transferring. Educated patient to use call light when in need of staff assistance with transferring, ambulating, and other activities of daily living. Patient appropriately uses call light this shift.        Problem: Neurosensory - Adult  Goal: Achieves stable or improved neurological status  Outcome: Progressing  Flowsheets (Taken 5/27/2025 0750)  Achieves stable or improved neurological status: Assess for and report changes in neurological status  Goal: Achieves maximal functionality and self care  Outcome: Progressing  Flowsheets (Taken 5/27/2025 0750)  Achieves maximal functionality and self care: Monitor swallowing and airway patency with patient 
  Problem: Chronic Conditions and Co-morbidities  Goal: Patient's chronic conditions and co-morbidity symptoms are monitored and maintained or improved  Outcome: Progressing  Flowsheets (Taken 5/30/2025 0123)  Care Plan - Patient's Chronic Conditions and Co-Morbidity Symptoms are Monitored and Maintained or Improved:   Monitor and assess patient's chronic conditions and comorbid symptoms for stability, deterioration, or improvement   Collaborate with multidisciplinary team to address chronic and comorbid conditions and prevent exacerbation or deterioration   Update acute care plan with appropriate goals if chronic or comorbid symptoms are exacerbated and prevent overall improvement and discharge     Problem: Discharge Planning  Goal: Discharge to home or other facility with appropriate resources  Outcome: Progressing  Flowsheets (Taken 5/30/2025 0123)  Discharge to home or other facility with appropriate resources:   Identify barriers to discharge with patient and caregiver   Refer to discharge planning if patient needs post-hospital services based on physician order or complex needs related to functional status, cognitive ability or social support system   Identify discharge learning needs (meds, wound care, etc)   Arrange for needed discharge resources and transportation as appropriate     Problem: ABCDS Injury Assessment  Goal: Absence of physical injury  Outcome: Progressing  Flowsheets (Taken 5/30/2025 0123)  Absence of Physical Injury: Implement safety measures based on patient assessment     Problem: Safety - Adult  Goal: Free from fall injury  Outcome: Progressing  Flowsheets (Taken 5/30/2025 0123)  Free From Fall Injury: Instruct family/caregiver on patient safety     Problem: Neurosensory - Adult  Goal: Achieves stable or improved neurological status  Outcome: Progressing  Flowsheets (Taken 5/30/2025 0123)  Achieves stable or improved neurological status: Assess for and report changes in neurological 
  Problem: Chronic Conditions and Co-morbidities  Goal: Patient's chronic conditions and co-morbidity symptoms are monitored and maintained or improved  Outcome: Progressing  Flowsheets (Taken 5/30/2025 1237)  Care Plan - Patient's Chronic Conditions and Co-Morbidity Symptoms are Monitored and Maintained or Improved: Monitor and assess patient's chronic conditions and comorbid symptoms for stability, deterioration, or improvement     Problem: Discharge Planning  Goal: Discharge to home or other facility with appropriate resources  Outcome: Progressing  Flowsheets (Taken 5/30/2025 1237)  Discharge to home or other facility with appropriate resources:   Identify barriers to discharge with patient and caregiver   Arrange for needed discharge resources and transportation as appropriate   Identify discharge learning needs (meds, wound care, etc)   Refer to discharge planning if patient needs post-hospital services based on physician order or complex needs related to functional status, cognitive ability or social support system     Problem: ABCDS Injury Assessment  Goal: Absence of physical injury  Outcome: Progressing  Flowsheets (Taken 5/30/2025 1237)  Absence of Physical Injury: Implement safety measures based on patient assessment     Problem: Safety - Adult  Goal: Free from fall injury  Outcome: Progressing  Flowsheets (Taken 5/30/2025 1237)  Free From Fall Injury: Instruct family/caregiver on patient safety     Problem: Neurosensory - Adult  Goal: Achieves stable or improved neurological status  Outcome: Progressing  Flowsheets (Taken 5/30/2025 1237)  Achieves stable or improved neurological status: Assess for and report changes in neurological status     Problem: Neurosensory - Adult  Goal: Achieves maximal functionality and self care  Outcome: Progressing  Flowsheets (Taken 5/30/2025 1237)  Achieves maximal functionality and self care:   Monitor swallowing and airway patency with patient fatigue and changes in 
  Problem: Chronic Conditions and Co-morbidities  Goal: Patient's chronic conditions and co-morbidity symptoms are monitored and maintained or improved  Outcome: Progressing  Flowsheets (Taken 5/31/2025 1020)  Care Plan - Patient's Chronic Conditions and Co-Morbidity Symptoms are Monitored and Maintained or Improved: Monitor and assess patient's chronic conditions and comorbid symptoms for stability, deterioration, or improvement     Problem: Discharge Planning  Goal: Discharge to home or other facility with appropriate resources  Outcome: Progressing  Flowsheets (Taken 5/31/2025 1020)  Discharge to home or other facility with appropriate resources:   Identify barriers to discharge with patient and caregiver   Identify discharge learning needs (meds, wound care, etc)   Refer to discharge planning if patient needs post-hospital services based on physician order or complex needs related to functional status, cognitive ability or social support system   Arrange for needed discharge resources and transportation as appropriate     Problem: ABCDS Injury Assessment  Goal: Absence of physical injury  Outcome: Progressing  Flowsheets (Taken 5/31/2025 1020)  Absence of Physical Injury: Implement safety measures based on patient assessment     Problem: Safety - Adult  Goal: Free from fall injury  Outcome: Progressing  Flowsheets (Taken 5/31/2025 1020)  Free From Fall Injury: Instruct family/caregiver on patient safety     Problem: Neurosensory - Adult  Goal: Achieves stable or improved neurological status  Outcome: Progressing  Flowsheets (Taken 5/31/2025 1020)  Achieves stable or improved neurological status: Assess for and report changes in neurological status     Problem: Neurosensory - Adult  Goal: Achieves maximal functionality and self care  Outcome: Progressing  Flowsheets (Taken 5/31/2025 1020)  Achieves maximal functionality and self care: Monitor swallowing and airway patency with patient fatigue and changes in 
  Problem: Discharge Planning  Goal: Discharge to home or other facility with appropriate resources  Outcome: Progressing  Flowsheets  Taken 6/2/2025 1037 by Aubrie Cleaning RN  Discharge to home or other facility with appropriate resources: Identify barriers to discharge with patient and caregiver  Taken 6/1/2025 2107 by Robb Layne RN  Discharge to home or other facility with appropriate resources:   Identify barriers to discharge with patient and caregiver   Arrange for needed discharge resources and transportation as appropriate   Identify discharge learning needs (meds, wound care, etc)   Patient will be discharged when medically stable    Problem: Safety - Adult  Goal: Free from fall injury  Outcome: Progressing   Patient will have no falls or injuries noted    Problem: Skin/Tissue Integrity - Adult  Goal: Skin integrity remains intact  Description: 1.  Monitor for areas of redness and/or skin breakdown2.  Assess vascular access sites hourly3.  Every 4-6 hours minimum:  Change oxygen saturation probe site4.  Every 4-6 hours:  If on nasal continuous positive airway pressure, respiratory therapy assess nares and determine need for appliance change or resting period  Outcome: Progressing  Flowsheets  Taken 6/2/2025 1037 by Aubrie Cleaning RN  Skin Integrity Remains Intact: Monitor for areas of redness and/or skin breakdown  Taken 6/1/2025 2107 by Robb Layne RN  Skin Integrity Remains Intact:   Monitor for areas of redness and/or skin breakdown   Assess vascular access sites hourly   Every 4-6 hours minimum:  Change oxygen saturation probe site   Patient will have no further skin breakdown and redness  Care plan reviewed with patient.  Patient verbalize understanding of the plan of care and contribute to goal setting.    
  Problem: Safety - Medical Restraint  Goal: Remains free of injury from restraints (Restraint for Interference with Medical Device)  Description: INTERVENTIONS:1. Determine that other, less restrictive measures have been tried or would not be effective before applying the restraint2. Evaluate the patient's condition at the time of restraint application3. Inform patient/family regarding the reason for restraint4. Q2H: Monitor safety, psychosocial status, comfort, nutrition and hydration  Outcome: Progressing  Flowsheets (Taken 5/28/2025 0623)  Remains free of injury from restraints (restraint for interference with medical device):   Determine that other, less restrictive measures have been tried or would not be effective before applying the restraint   Evaluate the patient's condition at the time of restraint application   Every 2 hours: Monitor safety, psychosocial status, comfort, nutrition and hydration  Care plan reviewed with pt. Pt unable to verbalize understanding of the care plan or contribute to goal settings at this time.       
  Problem: Safety - Medical Restraint  Goal: Remains free of injury from restraints (Restraint for Interference with Medical Device)  Description: INTERVENTIONS:1. Determine that other, less restrictive measures have been tried or would not be effective before applying the restraint2. Evaluate the patient's condition at the time of restraint application3. Inform patient/family regarding the reason for restraint4. Q2H: Monitor safety, psychosocial status, comfort, nutrition and hydration  Outcome: Progressing  Flowsheets (Taken 5/28/2025 0623)  Remains free of injury from restraints (restraint for interference with medical device):   Determine that other, less restrictive measures have been tried or would not be effective before applying the restraint   Evaluate the patient's condition at the time of restraint application   Every 2 hours: Monitor safety, psychosocial status, comfort, nutrition and hydration  Care plan reviewed with pt. Pt unable to verbalize understanding of the care plan or contribute to goal settings at this time.    Problem: Chronic Conditions and Co-morbidities  Goal: Patient's chronic conditions and co-morbidity symptoms are monitored and maintained or improved  Outcome: Progressing  Flowsheets (Taken 5/26/2025 1510)  Care Plan - Patient's Chronic Conditions and Co-Morbidity Symptoms are Monitored and Maintained or Improved:   Collaborate with multidisciplinary team to address chronic and comorbid conditions and prevent exacerbation or deterioration   Update acute care plan with appropriate goals if chronic or comorbid symptoms are exacerbated and prevent overall improvement and discharge   Monitor and assess patient's chronic conditions and comorbid symptoms for stability, deterioration, or improvement     Problem: Discharge Planning  Goal: Discharge to home or other facility with appropriate resources  Outcome: Progressing  Flowsheets (Taken 5/26/2025 1510)  Discharge to home or other facility 
Noted EKG showed new T wave inversions in anterior leads, however upon chart review, they have been present intermittently in the past.  Patient denies any chest pain.  Will check troponin in case.  Troponin was negative at OSH.  Have placed nursing communication to notify provider overnight if troponin is high or uptrending.  Likely suspect these are chronic changes for her, given presence of them multiple EKGs in the past.  
fatigue and changes in neurological status     Problem: Skin/Tissue Integrity - Adult  Goal: Skin integrity remains intact  Description: 1.  Monitor for areas of redness and/or skin breakdown2.  Assess vascular access sites hourly3.  Every 4-6 hours minimum:  Change oxygen saturation probe site4.  Every 4-6 hours:  If on nasal continuous positive airway pressure, respiratory therapy assess nares and determine need for appliance change or resting period  Outcome: Progressing  Flowsheets (Taken 5/27/2025 0750)  Skin Integrity Remains Intact: Monitor for areas of redness and/or skin breakdown     Problem: Musculoskeletal - Adult  Goal: Return mobility to safest level of function  Outcome: Progressing  Flowsheets (Taken 5/27/2025 0750)  Return Mobility to Safest Level of Function: Assist with transfers and ambulation using safe patient handling equipment as needed  Goal: Return ADL status to a safe level of function  Outcome: Progressing  Flowsheets (Taken 5/27/2025 0750)  Return ADL Status to a Safe Level of Function: Administer medication as ordered     Problem: Metabolic/Fluid and Electrolytes - Adult  Goal: Electrolytes maintained within normal limits  Outcome: Progressing  Flowsheets (Taken 5/27/2025 0750)  Electrolytes maintained within normal limits: Monitor labs and assess patient for signs and symptoms of electrolyte imbalances     Problem: Pain  Goal: Verbalizes/displays adequate comfort level or baseline comfort level  Outcome: Progressing  Flowsheets (Taken 5/27/2025 2914)  Verbalizes/displays adequate comfort level or baseline comfort level:   Encourage patient to monitor pain and request assistance   Assess pain using appropriate pain scale     Problem: Skin/Tissue Integrity  Goal: Skin integrity remains intact  Description: 1.  Monitor for areas of redness and/or skin breakdown2.  Assess vascular access sites hourly3.  Every 4-6 hours minimum:  Change oxygen saturation probe site4.  Every 4-6 hours:  If 
status     Problem: Neurosensory - Adult  Goal: Achieves maximal functionality and self care  Outcome: Progressing  Flowsheets (Taken 5/29/2025 0658)  Achieves maximal functionality and self care: Monitor swallowing and airway patency with patient fatigue and changes in neurological status     Problem: Skin/Tissue Integrity - Adult  Goal: Skin integrity remains intact  Description: 1.  Monitor for areas of redness and/or skin breakdown2.  Assess vascular access sites hourly3.  Every 4-6 hours minimum:  Change oxygen saturation probe site4.  Every 4-6 hours:  If on nasal continuous positive airway pressure, respiratory therapy assess nares and determine need for appliance change or resting period  Outcome: Progressing  Flowsheets (Taken 5/29/2025 0658)  Skin Integrity Remains Intact: Monitor for areas of redness and/or skin breakdown     Problem: Musculoskeletal - Adult  Goal: Return mobility to safest level of function  Outcome: Progressing  Flowsheets (Taken 5/29/2025 0658)  Return Mobility to Safest Level of Function: Assess patient stability and activity tolerance for standing, transferring and ambulating with or without assistive devices     Problem: Musculoskeletal - Adult  Goal: Return ADL status to a safe level of function  Outcome: Progressing  Flowsheets (Taken 5/29/2025 0658)  Return ADL Status to a Safe Level of Function:   Administer medication as ordered   Assess activities of daily living deficits and provide assistive devices as needed     Problem: Metabolic/Fluid and Electrolytes - Adult  Goal: Electrolytes maintained within normal limits  Outcome: Progressing  Flowsheets (Taken 5/29/2025 0658)  Electrolytes maintained within normal limits:   Monitor labs and assess patient for signs and symptoms of electrolyte imbalances   Administer electrolyte replacement as ordered     Problem: Skin/Tissue Integrity  Goal: Skin integrity remains intact  Description: 1.  Monitor for areas of redness and/or skin
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
## 2025-06-02 NOTE — CARE COORDINATION
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 IPR following. IV Rocephin completed.     PCP: Yayo Wren APRN - CNP  Readmission Risk Score: 14.9    Patient Goals/Plan/Treatment Preferences: Await return of precert for IPR.

## 2025-06-03 ENCOUNTER — HOSPITAL ENCOUNTER (INPATIENT)
Age: 89
LOS: 11 days | Discharge: HOME | End: 2025-06-14
Attending: STUDENT IN AN ORGANIZED HEALTH CARE EDUCATION/TRAINING PROGRAM | Admitting: STUDENT IN AN ORGANIZED HEALTH CARE EDUCATION/TRAINING PROGRAM
Payer: MEDICARE

## 2025-06-03 VITALS
BODY MASS INDEX: 24.44 KG/M2 | WEIGHT: 124.5 LBS | DIASTOLIC BLOOD PRESSURE: 95 MMHG | SYSTOLIC BLOOD PRESSURE: 155 MMHG | HEIGHT: 60 IN | HEART RATE: 78 BPM | TEMPERATURE: 97.8 F | RESPIRATION RATE: 16 BRPM | OXYGEN SATURATION: 97 %

## 2025-06-03 DIAGNOSIS — I63.9 CEREBROVASCULAR ACCIDENT (CVA), UNSPECIFIED MECHANISM (HCC): Primary | ICD-10-CM

## 2025-06-03 DIAGNOSIS — R09.89 DIMINISHED PULSES IN LOWER EXTREMITY: ICD-10-CM

## 2025-06-03 DIAGNOSIS — N30.00 ACUTE CYSTITIS WITHOUT HEMATURIA: ICD-10-CM

## 2025-06-03 DIAGNOSIS — I63.9 ACUTE CEREBROVASCULAR ACCIDENT (CVA) (HCC): ICD-10-CM

## 2025-06-03 DIAGNOSIS — I63.9 ACUTE CVA (CEREBROVASCULAR ACCIDENT) (HCC): ICD-10-CM

## 2025-06-03 DIAGNOSIS — I63.9 ACUTE ISCHEMIC STROKE (HCC): ICD-10-CM

## 2025-06-03 DIAGNOSIS — Z00.6 ENCOUNTER FOR EXAMINATION FOR NORMAL COMPARISON OR CONTROL IN CLINICAL RESEARCH PROGRAM: ICD-10-CM

## 2025-06-03 LAB
ANION GAP SERPL CALC-SCNC: 12 MEQ/L (ref 8–16)
BUN SERPL-MCNC: 22 MG/DL (ref 8–23)
CALCIUM SERPL-MCNC: 9.4 MG/DL (ref 8.8–10.2)
CHLORIDE SERPL-SCNC: 107 MEQ/L (ref 98–111)
CO2 SERPL-SCNC: 22 MEQ/L (ref 22–29)
CREAT SERPL-MCNC: 1 MG/DL (ref 0.5–0.9)
DEPRECATED RDW RBC AUTO: 52.6 FL (ref 35–45)
ERYTHROCYTE [DISTWIDTH] IN BLOOD BY AUTOMATED COUNT: 16.1 % (ref 11.5–14.5)
GFR SERPL CREATININE-BSD FRML MDRD: 54 ML/MIN/1.73M2
GLUCOSE SERPL-MCNC: 103 MG/DL (ref 74–109)
HCT VFR BLD AUTO: 40.2 % (ref 37–47)
HGB BLD-MCNC: 12.8 GM/DL (ref 12–16)
MCH RBC QN AUTO: 28.1 PG (ref 26–33)
MCHC RBC AUTO-ENTMCNC: 31.8 GM/DL (ref 32.2–35.5)
MCV RBC AUTO: 88.4 FL (ref 81–99)
PLATELET # BLD AUTO: 231 THOU/MM3 (ref 130–400)
PMV BLD AUTO: 10.1 FL (ref 9.4–12.4)
POTASSIUM SERPL-SCNC: 3.7 MEQ/L (ref 3.5–5.2)
RBC # BLD AUTO: 4.55 MILL/MM3 (ref 4.2–5.4)
SODIUM SERPL-SCNC: 141 MEQ/L (ref 135–145)
WBC # BLD AUTO: 4.9 THOU/MM3 (ref 4.8–10.8)

## 2025-06-03 PROCEDURE — 36415 COLL VENOUS BLD VENIPUNCTURE: CPT

## 2025-06-03 PROCEDURE — 6370000000 HC RX 637 (ALT 250 FOR IP)

## 2025-06-03 PROCEDURE — 6370000000 HC RX 637 (ALT 250 FOR IP): Performed by: STUDENT IN AN ORGANIZED HEALTH CARE EDUCATION/TRAINING PROGRAM

## 2025-06-03 PROCEDURE — 1180000000 HC REHAB R&B

## 2025-06-03 PROCEDURE — 80048 BASIC METABOLIC PNL TOTAL CA: CPT

## 2025-06-03 PROCEDURE — 2500000003 HC RX 250 WO HCPCS: Performed by: STUDENT IN AN ORGANIZED HEALTH CARE EDUCATION/TRAINING PROGRAM

## 2025-06-03 PROCEDURE — 99222 1ST HOSP IP/OBS MODERATE 55: CPT | Performed by: STUDENT IN AN ORGANIZED HEALTH CARE EDUCATION/TRAINING PROGRAM

## 2025-06-03 PROCEDURE — 6360000002 HC RX W HCPCS: Performed by: STUDENT IN AN ORGANIZED HEALTH CARE EDUCATION/TRAINING PROGRAM

## 2025-06-03 PROCEDURE — 85027 COMPLETE CBC AUTOMATED: CPT

## 2025-06-03 RX ORDER — LATANOPROST 50 UG/ML
1 SOLUTION/ DROPS OPHTHALMIC NIGHTLY
Status: DISCONTINUED | OUTPATIENT
Start: 2025-06-03 | End: 2025-06-14 | Stop reason: HOSPADM

## 2025-06-03 RX ORDER — FAMOTIDINE 20 MG/1
20 TABLET, FILM COATED ORAL DAILY
Status: CANCELLED | OUTPATIENT
Start: 2025-06-04

## 2025-06-03 RX ORDER — SODIUM CHLORIDE 0.9 % (FLUSH) 0.9 %
5-40 SYRINGE (ML) INJECTION EVERY 12 HOURS SCHEDULED
Status: DISCONTINUED | OUTPATIENT
Start: 2025-06-03 | End: 2025-06-08

## 2025-06-03 RX ORDER — ONDANSETRON 4 MG/1
4 TABLET, ORALLY DISINTEGRATING ORAL EVERY 8 HOURS PRN
Status: CANCELLED | OUTPATIENT
Start: 2025-06-03

## 2025-06-03 RX ORDER — MULTIVITAMIN WITH IRON
1 TABLET ORAL DAILY
Status: DISCONTINUED | OUTPATIENT
Start: 2025-06-04 | End: 2025-06-14 | Stop reason: HOSPADM

## 2025-06-03 RX ORDER — HYDROXYZINE HYDROCHLORIDE 50 MG/ML
25 INJECTION, SOLUTION INTRAMUSCULAR EVERY 6 HOURS PRN
Status: CANCELLED | OUTPATIENT
Start: 2025-06-03

## 2025-06-03 RX ORDER — SENNOSIDES 8.6 MG
325 CAPSULE ORAL DAILY
Status: DISCONTINUED | OUTPATIENT
Start: 2025-06-04 | End: 2025-06-14 | Stop reason: HOSPADM

## 2025-06-03 RX ORDER — DOCUSATE SODIUM 100 MG/1
100 CAPSULE, LIQUID FILLED ORAL DAILY
Status: DISCONTINUED | OUTPATIENT
Start: 2025-06-04 | End: 2025-06-14 | Stop reason: HOSPADM

## 2025-06-03 RX ORDER — POTASSIUM CHLORIDE 1500 MG/1
10 TABLET, EXTENDED RELEASE ORAL DAILY
Status: DISCONTINUED | OUTPATIENT
Start: 2025-06-04 | End: 2025-06-14 | Stop reason: HOSPADM

## 2025-06-03 RX ORDER — ATORVASTATIN CALCIUM 80 MG/1
80 TABLET, FILM COATED ORAL NIGHTLY
Status: CANCELLED | OUTPATIENT
Start: 2025-06-03

## 2025-06-03 RX ORDER — SODIUM CHLORIDE 0.9 % (FLUSH) 0.9 %
5-40 SYRINGE (ML) INJECTION PRN
Status: DISCONTINUED | OUTPATIENT
Start: 2025-06-03 | End: 2025-06-14 | Stop reason: HOSPADM

## 2025-06-03 RX ORDER — SODIUM CHLORIDE 9 MG/ML
INJECTION, SOLUTION INTRAVENOUS PRN
Status: CANCELLED | OUTPATIENT
Start: 2025-06-03

## 2025-06-03 RX ORDER — SODIUM CHLORIDE 9 MG/ML
INJECTION, SOLUTION INTRAVENOUS PRN
Status: DISCONTINUED | OUTPATIENT
Start: 2025-06-03 | End: 2025-06-14 | Stop reason: HOSPADM

## 2025-06-03 RX ORDER — POLYETHYLENE GLYCOL 3350 17 G/17G
17 POWDER, FOR SOLUTION ORAL DAILY
Status: CANCELLED | OUTPATIENT
Start: 2025-06-04

## 2025-06-03 RX ORDER — ENOXAPARIN SODIUM 100 MG/ML
30 INJECTION SUBCUTANEOUS DAILY
Status: CANCELLED | OUTPATIENT
Start: 2025-06-04

## 2025-06-03 RX ORDER — CHLORAL HYDRATE 500 MG
1 CAPSULE ORAL DAILY
Status: CANCELLED | OUTPATIENT
Start: 2025-06-04

## 2025-06-03 RX ORDER — DOCUSATE SODIUM 100 MG/1
100 CAPSULE, LIQUID FILLED ORAL DAILY
Status: CANCELLED | OUTPATIENT
Start: 2025-06-04

## 2025-06-03 RX ORDER — POTASSIUM CHLORIDE 1500 MG/1
10 TABLET, EXTENDED RELEASE ORAL DAILY
Status: CANCELLED | OUTPATIENT
Start: 2025-06-04

## 2025-06-03 RX ORDER — FAMOTIDINE 20 MG/1
20 TABLET, FILM COATED ORAL DAILY
Status: DISCONTINUED | OUTPATIENT
Start: 2025-06-04 | End: 2025-06-14 | Stop reason: HOSPADM

## 2025-06-03 RX ORDER — CHLORAL HYDRATE 500 MG
1 CAPSULE ORAL DAILY
Status: DISCONTINUED | OUTPATIENT
Start: 2025-06-04 | End: 2025-06-14 | Stop reason: HOSPADM

## 2025-06-03 RX ORDER — CLOPIDOGREL BISULFATE 75 MG/1
75 TABLET ORAL DAILY
Status: CANCELLED | OUTPATIENT
Start: 2025-06-04

## 2025-06-03 RX ORDER — ATORVASTATIN CALCIUM 80 MG/1
80 TABLET, FILM COATED ORAL NIGHTLY
Status: DISCONTINUED | OUTPATIENT
Start: 2025-06-03 | End: 2025-06-14 | Stop reason: HOSPADM

## 2025-06-03 RX ORDER — POLYETHYLENE GLYCOL 3350 17 G/17G
17 POWDER, FOR SOLUTION ORAL DAILY
Status: DISCONTINUED | OUTPATIENT
Start: 2025-06-04 | End: 2025-06-14 | Stop reason: HOSPADM

## 2025-06-03 RX ORDER — SENNOSIDES 8.6 MG/1
1 TABLET ORAL NIGHTLY
Status: DISCONTINUED | OUTPATIENT
Start: 2025-06-03 | End: 2025-06-14 | Stop reason: HOSPADM

## 2025-06-03 RX ORDER — SENNOSIDES 8.6 MG
325 CAPSULE ORAL DAILY
Status: CANCELLED | OUTPATIENT
Start: 2025-06-04

## 2025-06-03 RX ORDER — SODIUM CHLORIDE 0.9 % (FLUSH) 0.9 %
5-40 SYRINGE (ML) INJECTION PRN
Status: CANCELLED | OUTPATIENT
Start: 2025-06-03

## 2025-06-03 RX ORDER — TRIAMCINOLONE ACETONIDE 1 MG/G
CREAM TOPICAL 2 TIMES DAILY
Status: CANCELLED | OUTPATIENT
Start: 2025-06-03

## 2025-06-03 RX ORDER — SENNOSIDES 8.6 MG/1
1 TABLET ORAL NIGHTLY
Status: CANCELLED | OUTPATIENT
Start: 2025-06-03

## 2025-06-03 RX ORDER — MULTIVITAMIN WITH IRON
1 TABLET ORAL DAILY
Status: CANCELLED | OUTPATIENT
Start: 2025-06-04

## 2025-06-03 RX ORDER — HYDROXYZINE HYDROCHLORIDE 50 MG/ML
25 INJECTION, SOLUTION INTRAMUSCULAR EVERY 6 HOURS PRN
Status: DISCONTINUED | OUTPATIENT
Start: 2025-06-03 | End: 2025-06-03

## 2025-06-03 RX ORDER — TRIAMCINOLONE ACETONIDE 1 MG/G
CREAM TOPICAL 2 TIMES DAILY
Status: DISCONTINUED | OUTPATIENT
Start: 2025-06-03 | End: 2025-06-14 | Stop reason: HOSPADM

## 2025-06-03 RX ORDER — ENOXAPARIN SODIUM 100 MG/ML
30 INJECTION SUBCUTANEOUS DAILY
Status: DISCONTINUED | OUTPATIENT
Start: 2025-06-04 | End: 2025-06-14 | Stop reason: HOSPADM

## 2025-06-03 RX ORDER — SODIUM CHLORIDE 0.9 % (FLUSH) 0.9 %
5-40 SYRINGE (ML) INJECTION EVERY 12 HOURS SCHEDULED
Status: CANCELLED | OUTPATIENT
Start: 2025-06-03

## 2025-06-03 RX ORDER — ONDANSETRON 4 MG/1
4 TABLET, ORALLY DISINTEGRATING ORAL EVERY 8 HOURS PRN
Status: DISCONTINUED | OUTPATIENT
Start: 2025-06-03 | End: 2025-06-14 | Stop reason: HOSPADM

## 2025-06-03 RX ORDER — CLOPIDOGREL BISULFATE 75 MG/1
75 TABLET ORAL DAILY
Status: DISCONTINUED | OUTPATIENT
Start: 2025-06-04 | End: 2025-06-14 | Stop reason: HOSPADM

## 2025-06-03 RX ORDER — DILTIAZEM HYDROCHLORIDE 120 MG/1
120 CAPSULE, COATED, EXTENDED RELEASE ORAL DAILY
Status: DISCONTINUED | OUTPATIENT
Start: 2025-06-04 | End: 2025-06-11

## 2025-06-03 RX ORDER — ASPIRIN 81 MG/1
81 TABLET, CHEWABLE ORAL DAILY
Status: DISCONTINUED | OUTPATIENT
Start: 2025-06-21 | End: 2025-06-14 | Stop reason: HOSPADM

## 2025-06-03 RX ORDER — ACETAMINOPHEN 325 MG/1
650 TABLET ORAL EVERY 4 HOURS PRN
Status: DISCONTINUED | OUTPATIENT
Start: 2025-06-03 | End: 2025-06-14 | Stop reason: HOSPADM

## 2025-06-03 RX ORDER — LATANOPROST 50 UG/ML
1 SOLUTION/ DROPS OPHTHALMIC NIGHTLY
Status: CANCELLED | OUTPATIENT
Start: 2025-06-03

## 2025-06-03 RX ORDER — DILTIAZEM HYDROCHLORIDE 120 MG/1
120 CAPSULE, COATED, EXTENDED RELEASE ORAL DAILY
Status: CANCELLED | OUTPATIENT
Start: 2025-06-04

## 2025-06-03 RX ADMIN — ATORVASTATIN CALCIUM 80 MG: 80 TABLET, FILM COATED ORAL at 20:27

## 2025-06-03 RX ADMIN — FAMOTIDINE 20 MG: 20 TABLET, FILM COATED ORAL at 08:39

## 2025-06-03 RX ADMIN — SODIUM CHLORIDE, PRESERVATIVE FREE 10 ML: 5 INJECTION INTRAVENOUS at 08:40

## 2025-06-03 RX ADMIN — DOCUSATE SODIUM 100 MG: 100 CAPSULE, LIQUID FILLED ORAL at 08:39

## 2025-06-03 RX ADMIN — ENOXAPARIN SODIUM 30 MG: 100 INJECTION SUBCUTANEOUS at 08:39

## 2025-06-03 RX ADMIN — TRIAMCINOLONE ACETONIDE: 1 CREAM TOPICAL at 20:27

## 2025-06-03 RX ADMIN — METOPROLOL TARTRATE 75 MG: 50 TABLET, FILM COATED ORAL at 08:39

## 2025-06-03 RX ADMIN — TRIAMCINOLONE ACETONIDE: 1 CREAM TOPICAL at 08:40

## 2025-06-03 RX ADMIN — METOPROLOL TARTRATE 75 MG: 50 TABLET, FILM COATED ORAL at 20:27

## 2025-06-03 RX ADMIN — Medication 1 TABLET: at 08:39

## 2025-06-03 RX ADMIN — DILTIAZEM HYDROCHLORIDE 120 MG: 120 CAPSULE, EXTENDED RELEASE ORAL at 08:39

## 2025-06-03 RX ADMIN — LATANOPROST 1 DROP: 50 SOLUTION OPHTHALMIC at 20:27

## 2025-06-03 RX ADMIN — ASPIRIN 325 MG: 325 TABLET, COATED ORAL at 08:39

## 2025-06-03 RX ADMIN — POLYETHYLENE GLYCOL 3350 17 G: 17 POWDER, FOR SOLUTION ORAL at 08:40

## 2025-06-03 RX ADMIN — POTASSIUM CHLORIDE 10 MEQ: 1500 TABLET, EXTENDED RELEASE ORAL at 08:39

## 2025-06-03 RX ADMIN — Medication 1000 MG: at 08:39

## 2025-06-03 RX ADMIN — CLOPIDOGREL BISULFATE 75 MG: 75 TABLET, FILM COATED ORAL at 08:39

## 2025-06-03 ASSESSMENT — PAIN SCALES - GENERAL: PAINLEVEL_OUTOF10: 0

## 2025-06-03 NOTE — PLAN OF CARE
Problem: Discharge Planning  Goal: Discharge to home or other facility with appropriate resources  6/3/2025 1555 by Cassie John, RN  Note: No discharge plan at this time. Patient just arrived to rehab.  6/3/2025 1535 by Tipton, Rylee, LPN  Outcome: Progressing  Flowsheets (Taken 6/3/2025 1523)  Discharge to home or other facility with appropriate resources:   Identify barriers to discharge with patient and caregiver   Identify discharge learning needs (meds, wound care, etc)
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
## 2025-06-03 NOTE — PLAN OF CARE
Problem: Chronic Conditions and Co-morbidities  Goal: Patient's chronic conditions and co-morbidity symptoms are monitored and maintained or improved  Outcome: Progressing  Flowsheets (Taken 6/3/2025 1529)  Care Plan - Patient's Chronic Conditions and Co-Morbidity Symptoms are Monitored and Maintained or Improved:   Monitor and assess patient's chronic conditions and comorbid symptoms for stability, deterioration, or improvement   Collaborate with multidisciplinary team to address chronic and comorbid conditions and prevent exacerbation or deterioration     Problem: Discharge Planning  Goal: Discharge to home or other facility with appropriate resources  Outcome: Progressing  Flowsheets (Taken 6/3/2025 1523)  Discharge to home or other facility with appropriate resources:   Identify barriers to discharge with patient and caregiver   Identify discharge learning needs (meds, wound care, etc)     Problem: Skin/Tissue Integrity  Goal: Skin integrity remains intact  Description: 1.  Monitor for areas of redness and/or skin breakdown2.  Assess vascular access sites hourly3.  Every 4-6 hours minimum:  Change oxygen saturation probe site4.  Every 4-6 hours:  If on nasal continuous positive airway pressure, respiratory therapy assess nares and determine need for appliance change or resting period  Outcome: Progressing  Flowsheets  Taken 6/3/2025 1529  Skin Integrity Remains Intact:   Monitor for areas of redness and/or skin breakdown   Assess vascular access sites hourly    Problem: Safety - Adult  Goal: Free from fall injury  Outcome: Progressing  Flowsheets (Taken 6/3/2025 1512)  Free From Fall Injury: Instruct family/caregiver on patient safety     Problem: ABCDS Injury Assessment  Goal: Absence of physical injury  Outcome: Progressing  Flowsheets (Taken 6/3/2025 1512)  Absence of Physical Injury: Implement safety measures based on patient assessment

## 2025-06-03 NOTE — PROGRESS NOTES
Internal Medicine Resident  Progress Note      Patient:  Lulu Werner    Unit/Bed:4A-12/012-A  YOB: 1935  MRN: [id number]   Acct: [id number]   PCP: Yayo Wren APRN - CNP  Date of Admission: 5/26/2025 1   Date of Service: Pt seen/examined on 05/29/25      Assessment/Plan:  CVA: Acute infarcts of the left subinsular cortex, left parietal subcortical white matter and right frontal subcortical white matter.    Expressive aphasia: mildly improving. Still profound.  Patient initially presented with right-sided weakness and numbness.  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  Collateral reconstitution.  MRI was initially negative for acute infarct.  Early a.m. of 5/27 NIH worsened and patient had right-sided facial droop.  continue with DAPT x 21 days.   Continue high intensity statin  PT/OT/SLP  Bp goal as below.  RUE edema: extravasation vs DVT  F/u on RUE US.  Atrial fibrillation s/p watchman 2024: With RVR.  In the setting of known A-fib that is typically rate controlled with Toprol and diltiazem.  These were held to facilitate systolic blood pressure goal 180.  She received digoxin yesterday.  Was started on amiodarone gtt. overnight.  Will resume Toprol and diltiazem and discontinue the amiodarone gtt.  HTN: Resume diltiazem, Bumex. Increase metoprolol. Goal is normotension.  HLD: Continue statin.  GERD: Continue famotidine  Macular degeneration: Chronic right eye blindness.  Continue home meds.  Vitamin D deficiency: Continue home meds.  HFpEF: Not in exacerbation.  Continue home Bumex.  Metoprolol 50 twice daily.  Strict I's and O's.  Goals of care: CODE STATUS was discussed with patient and her adult son at the bedside.  He stated that she would not want to be placed on mechanical ventilator.  Would not want chest compressions, defibrillation, or code meds.  CODE STATUS changed to limited no x 4 to reflect these 
                  Internal Medicine Resident  Progress Note      Patient:  Lulu Werner    Unit/Bed:4A-12/012-A  YOB: 1935  MRN: [id number]   Acct: [id number]   PCP: Yayo Wren APRN - CNP  Date of Admission: 5/26/2025 3   Date of Service: Pt seen/examined on 05/31/25      Assessment/Plan:  CVA: Acute infarcts of the left subinsular cortex, left parietal subcortical white matter and right frontal subcortical white matter.    Expressive aphasia: mildly improving. Still profound.  Patient initially presented with right-sided weakness and numbness.  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  Collateral reconstitution.  MRI was initially negative for acute infarct.  Early a.m. of 5/27 NIH worsened and patient had right-sided facial droop.  continue with DAPT x 21 days.   Continue high intensity statin  PT/OT/SLP  UTI: mild renal insufficiency. Start gentle IV fluids x 12 hours. Continue ceftriaxone. F/u urine cultures.  Superficial thrombophlebitis of right basilic vein: 2/2 IV infiltration..   Continue heat packs.   Atrial fibrillation s/p watchman 2024: rate controlled. In the setting of known A-fib that is typically rate controlled with Toprol and diltiazem.  These were held to facilitate systolic blood pressure goal 180.  S/p digoxin dose.  S/p amiodarone gtt..  continue  Toprol and diltiazem   HTN:  continue diltiazem, Bumex. metoprolol. Goal is normotension.  HLD: Continue statin.  GERD: Continue famotidine  Macular degeneration: Chronic right eye blindness.  Continue home meds.  Vitamin D deficiency: Continue home meds.  HFpEF: Not in exacerbation.  Continue home Bumex.  Metoprolol 50 twice daily.  Strict I's and O's.  Goals of care: CODE STATUS was discussed with patient and her adult son at the bedside.  He stated that she would not want to be placed on mechanical ventilator.  Would not want chest compressions, defibrillation, 
                  Internal Medicine Resident  Progress Note      Patient:  Lulu Werner    Unit/Bed:4A-12/012-A  YOB: 1935  MRN: [id number]   Acct: [id number]   PCP: No primary care provider on file.  Date of Admission: 5/26/2025 0   Date of Service: Pt seen/examined on 05/27/25      Assessment/Plan:  Expressive aphasia: Patient initially presented with right-sided weakness and numbness.  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  Collateral reconstitution.  MRI was initially negative for acute infarct.  Early a.m. of 5/27 NIH worsened and patient had right-sided facial droop.  Concern for acute infarct overnight.  Repeat MRI brain.  Continue with SBP goal 180-200 per neuro recs.  Received Plavix load.  Continue aspirin.  If acute stroke will continue with DAPT.  PT/OT/SLP  F/U echo.  Atrial fibrillation s/p watchman 2024: With RVR.  In the setting of known A-fib that is typically rate controlled with Toprol and diltiazem.  This was held this morning to allow for hypertension as above.  Have ordered for continued Toprol.  Hold diltiazem.  Given one-time digoxin dose.  If persistently in RVR will consider amiodarone.  HTN: Hold antihypertensives for goal -120 per neuro recs.  HLD: Continue statin.  GERD: Continue famotidine  Macular degeneration: Chronic right eye blindness.  Continue home meds.  Vitamin D deficiency: Continue home meds.  HFpEF: Not in exacerbation.  Continue to hold home diuresis (Bumex 1 mg daily).  Likely resume tomorrow.  Strict I's and O's.        Expected discharge date: TBD    Disposition: TBD  [] Home  [] Inpatient Rehab  [] Psychiatric Unit  [] SNF  [] Long Term Care Facility  [] Other-    ===================================================================      Chief Complaint: Right-sided weakness    Hospital Course: Per initial H&P, \"Ms. Lulu Werner is a 89 y.o. female who presents to ED via 
                  Internal Medicine Resident  Progress Note      Patient:  Lulu Werner    Unit/Bed:4A-12/012-A  YOB: 1935  MRN: [id number]   Acct: [id number]   PCP: Yayo Wern APRN - CNP  Date of Admission: 5/26/2025 0   Date of Service: Pt seen/examined on 05/28/25      Assessment/Plan:  CVA: Acute infarcts of the left subinsular cortex, left parietal subcortical white matter and right frontal subcortical white matter.    Expressive aphasia: mildly improving. Still profound.  Patient initially presented with right-sided weakness and numbness.  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  Collateral reconstitution.  MRI was initially negative for acute infarct.  Early a.m. of 5/27 NIH worsened and patient had right-sided facial droop.  continue with DAPT x 21 days.   Continue high intensity statin  PT/OT/SLP  Bp goal as below.  Atrial fibrillation s/p watchman 2024: With RVR.  In the setting of known A-fib that is typically rate controlled with Toprol and diltiazem.  These were held to facilitate systolic blood pressure goal 180.  She received digoxin yesterday.  Was started on amiodarone gtt. overnight.  Will resume Toprol and diltiazem and discontinue the amiodarone gtt.  HTN: Resume diltiazem, Bumex, metoprolol.  May need to uptitrate.  Goal is normotension.  HLD: Continue statin.  GERD: Continue famotidine  Macular degeneration: Chronic right eye blindness.  Continue home meds.  Vitamin D deficiency: Continue home meds.  HFpEF: Not in exacerbation.  Continue home Bumex.  Metoprolol 50 twice daily.  Strict I's and O's.  Goals of care: CODE STATUS was discussed with patient and her adult son at the bedside.  He stated that she would not want to be placed on mechanical ventilator.  Would not want chest compressions, defibrillation, or code meds.  CODE STATUS changed to limited no x 4 to reflect these wishes.        Expected 
      Pharmacy Renal Adjustment    Pharmacy renally adjusted the following medication(s) per P&T approved policy: famotidine    Recent Labs     05/26/25  1220   BUN 22   CREATININE 0.9     eGFR:   Recent Labs     05/26/25  1220   LABGLOM 61     Estimated Creatinine Clearance: 34 mL/min (based on SCr of 0.9 mg/dL).    Assessment:  N/A    Plan:   Decrease famotidine from 20mg BID to 20mg daily    Please call pharmacy with any questions.    Opal Gastelum RP, BCPS, BCGP  5/26/2025     8:13 PM      
    Hospitalist Progress Note  Internal Medicine Resident      Patient: Lulu Werner 89 y.o. female      Unit/Bed: -02/002-A    Admit Date: 5/26/2025      ASSESSMENT AND PLAN  CVA: CT showing acute infarcts of the left subinsular cortex, left parietal subcortical white matter and right frontal subcortical white monitor.  Presenting with expressive aphasia resolved at this point.  Continues to have right-sided weakness and numbness in the right upper and lower extremities, however symptoms are improving.  CTA showing severe stenosis of the P2 segment occlusion and super Cholinoid segments of the left ICA.  Will continue DAPT for 21 days, continue high intensity statin.  PT OT SLP consulted and following, awaiting IPR Precert.     UTI: Urinalysis positive for many bacteria, WBC greater than 200 culture growing proteus morabilis.  Completed 3 days rocephin     Superficial thrombophlebitis of right basilic vein secondary to IV infiltration: Resolving.    History of A-fib s/p Watchman device placement 2024: Currently rate controlled.  Continue metoprolol 75 mg twice daily, Cardizem 120 mg daily    Hypertension: Continue Cardizem, Toprol    Hyperlipidemia: On statin    GERD: Famotidine    Chronic macular degeneration: Right eye blindness, continue home medications    Chronic HFpEF: Not in acute exacerbation GDMT includes Toprol 75 mg twice daily.  Last cardiac echo 5/27/2025 showing EF of 60% normal wall thickness normal wall motion.  Moderate stenosis of the aortic valve AV mean gradient 5 mmHg, AV area 1.4 cm²      LDA: []CVC / []PICC / []Midline / []Hanson / []Drains / []Mediport / [x]None  Antibiotics: none  Steroids: None  Labs (still needed?): [x]Yes / []No  IVF (still needed?): []Yes / [x]No    Level of care: [x]Step Down / []Med-Surg  Bed Status: [x]Inpatient / []Observation  Telemetry: [x]Yes / []No  PT/OT: [x]Yes / []No    DVT Prophylaxis: [x] Lovenox / [] Heparin / [] SCDs / [] Already on Systemic 
    Hospitalist Progress Note  Internal Medicine Resident      Patient: Lulu Werner 89 y.o. female      Unit/Bed: -02/002-A    Admit Date: 5/26/2025      ASSESSMENT AND PLAN  CVA: CT showing acute infarcts of the left subinsular cortex, left parietal subcortical white matter and right frontal subcortical white monitor.  Presenting with expressive aphasia resolved at this point.  Continues to have right-sided weakness and numbness in the right upper and lower extremities, however symptoms are improving.  CTA showing severe stenosis of the P2 segment occlusion and super Cholinoid segments of the left ICA.  Will continue DAPT for 21 days, continue high intensity statin.  PT OT SLP consulted and following, will consult IPR for possible inpatient rehab stay.    UTI: Urinalysis positive for many bacteria, WBC greater than 200 culture growing gram-negative bacilli.  Continue Rocephin.    Superficial thrombophlebitis of right basilic vein secondary to IV infiltration: Resolving.    History of A-fib s/p Watchman device placement 2024: Currently rate controlled.  Continue metoprolol 75 mg twice daily, Cardizem 120 mg daily    Hypertension: Continue Cardizem, Toprol    Hyperlipidemia: On statin    GERD: Famotidine    Chronic macular degeneration: Right eye blindness, continue home medications    Chronic HFpEF: Not in acute exacerbation GDMT includes Toprol 75 mg twice daily.  Last cardiac echo 5/27/2025 showing EF of 60% normal wall thickness normal wall motion.  Moderate stenosis of the aortic valve AV mean gradient 5 mmHg, AV area 1.4 cm²      LDA: []CVC / []PICC / []Midline / []Hanson / []Drains / []Mediport / [x]None  Antibiotics: Rocephin  Steroids: None  Labs (still needed?): [x]Yes / []No  IVF (still needed?): []Yes / [x]No    Level of care: [x]Step Down / []Med-Surg  Bed Status: [x]Inpatient / []Observation  Telemetry: [x]Yes / []No  PT/OT: [x]Yes / []No    DVT Prophylaxis: [x] Lovenox / [] Heparin / [] SCDs / [] 
    Pharmacist Review and Automatic Dose Adjustment of Prophylactic Enoxaparin or Heparin    Reviewed reason for admission/hospital problem list    The reviewing pharmacist has made an adjustment to the ordered enoxaparin or heparin dose or converted to heparin per the approved [facility] protocol and table as identified below.      Recent Labs     05/26/25  1220 05/27/25  0431 05/28/25  0935   CREATININE 0.9 1.0* 1.0*     Estimated Creatinine Clearance: 27 mL/min (A) (based on SCr of 1 mg/dL (H)).    Recent Labs     05/27/25  0431 05/28/25  0935   HGB 13.0 14.2   HCT 40.7 44.5    205     Recent Labs     05/26/25  1220   INR 1.34*       Height:   Ht Readings from Last 1 Encounters:   05/26/25 1.524 m (5')     Weight:  Wt Readings from Last 1 Encounters:   05/29/25 51.3 kg (113 lb 3.2 oz)       *Do not exceed enoxaparin 40mg daily or UFH 5000 units SUBQ TID in patients with epidurals,  lumbar drains, or external ventricular drains.    Plan:   Changed enoxaparin 40 mg subq daily to enoxaparin 30 mg subq daily.     Thank you,    Jose Ocampo, PharmD, BCPS  5/29/2025  10:52 AM      
  Insurance company requesting updated clinicals be faxed today including updated therapy notes.  Therapy team sent request to see patient as priority for updates for precert submission.         
 [facility]  INPATIENT PHYSICAL THERAPY  DAILY NOTE  [facility] ONC MED 5K - 5K-02/002-A      Discharge Recommendations: Inpatient Rehabilitation  Equipment Recommendations: No (will continue to assess)             Time In: 1120  Time Out: 1144  Timed Code Treatment Minutes: 24 Minutes  Minutes: 24          Date: 2025  Patient Name: Lulu Werner,  Gender:  female        MRN: [id number]  : 1935  (89 y.o.)     Referring Practitioner: Chad Andrews DO  Diagnosis: Stroke-like symptoms  Additional Pertinent Hx: Per EMR:Per initial H&P, \"Ms. Lulu Werner is a 89 y.o. 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     She could not move her right leg and arm this morning about 7:30-8 am. At 7 am she was fine, she went to bathroom and laid down. She can not see out of right eye which is chronic for her since 5 years, it has not changed. She had numbness on right side earlier which has resolved now.  Her right sided weakness is improving as well.  No fever or chills. No chest pain or SOB. No abdominal pain, nausea or vomiting. No issues with bm. No issues with urination. No prior hx of CVA.  Patient's speech is little slow per family at bedside.     No ETOH use. No recreational drug use. No tobacco use, former tobacco use.      She presented to outside hospital where she underwent CT head, CTA head and neck.  CT head was negative for acute hemorrhage, showed moderate severity chronic small vessel ischemic changes, suspected small meningioma along the floor of the right anterior cranial fossa.  CTA head and neck showed occluded cavernous segment of the left ICA, the supraclinoid segment is also occluded, the left ophthalmic artery does not fill with contrast, no other occlusions.  She was given heparin at OSH which was stopped on arrival.     Neurology were consulted by ER who recommended admission, thus hospitalist team were contacted for 
0115- On assessment this nurse noticed patients right lower arm to be red, hot, swollen, and firm. Dr. Dorsey notified   0257- Doppler, Hylenex, and warm compress ordered  
[facility]  STRZ ONC MED 5K  Occupational Therapy  Daily Note    Discharge Recommendations: Inpatient Rehabilitation  Equipment Recommendations:   monitor pending progress      Time In: 918  Time Out: 956  Timed Code Treatment Minutes: 38 Minutes  Minutes: 38          Date: 2025  Patient Name: Lulu Werner,   Gender: female      Room: -02/002-A  MRN: [id number]  : 1935  (89 y.o.)  Referring Practitioner: Chad Andrews DO  Diagnosis: Stroke-like symptoms  Additional Pertinent Hx: Per MD H & P: 89 y.o. 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     She could not move her right leg and arm this morning about 7:30-8 am. At 7 am she was fine, she went to bathroom and laid down. She can not see out of right eye which is chronic for her since 5 years, it has not changed. She had numbness on right side earlier which has resolved now.  Her right sided weakness is improving as well.  No prior hx of CVA.  Patient's speech is little slow per family at bedside. MRI 2025: Interval deterioration since previous study dated 2025 with new areas of  restricted diffusion in the left subinsular cortex, left parietal subcortical  white matter and right frontal subcortical white matter,  There is diminished  signal intensity on the ADC map consistent with acute infarcts.  2. Mild atrophy and probable ischemic changes in the white matter.  3. Multiple punctate microhemorrhages especially in the left parietal and left  temporal lobes, left basal ganglia and right parietal cortex, unchanged.    Restrictions/Precautions:  Restrictions/Precautions: Fall Risk  Position Activity Restriction  Other Position/Activity Restrictions: monitor BP, tends to run high     Social/Functional History:  Lives With: Alone  Home Layout: Two level  Home Equipment: Cane, Walker - Rolling   Bathroom Equipment: None       Prior Level of Assist for ADLs: 
Echo completed. Dr. Huston to read. No bubble study performed due to previous negative bubble study.   
[facility]  SPEECH THERAPY  STRZ NEUROSCIENCES 4A  Clinical Swallow Evaluation    Discharge Recommendations: Inpatient Rehabilitation and continue to assess pending progress   DIET ORDER RECOMMENDATIONS AFTER EVALUATION: Regular solids/thin liquids   Strategies:  Full Upright Position, Small Bite/Sip, Pulmonary Monitoring, Oral Care after all Meals, Medications Whole with Thin, Alternate Solids and Liquids, Limit Distractions, and Monitor for Fatigue  *Direct 1:1 supervision with meals; endorsing use of STRAW      SLP Individual Minutes  Time In: 1010  Time Out: 1032  Minutes: 22  Timed Code Treatment Minutes: 0 Minutes       Date: 2025  Patient Name: Lulu Werner      CSN: [id number]   : 1935  (89 y.o.)  Gender: female   Referring Physician:   Behl, Ashita, MD  Diagnosis: Stroke-like symptoms    History of Present Illness/Injury: Patient presented to [facility] with the above medical dx. Refer to physician H&P: \"Ms. Lulu Werner is a 89 y.o. 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  She could not move her right leg and arm this morning about 7:30-8 am. At 7 am she was fine, she went to bathroom and laid down. She can not see out of right eye which is chronic for her since 5 years, it has not changed. She had numbness on right side earlier which has resolved now.  Her right sided weakness is improving as well.  No fever or chills. No chest pain or SOB. No abdominal pain, nausea or vomiting. No issues with bm. No issues with urination. No prior hx of CVA.  Patient's speech is little slow per family at bedside.  No ETOH use. No recreational drug use. No tobacco use, former tobacco use.   She presented to outside hospital where she underwent CT head, CTA head and neck.  CT head was negative for acute hemorrhage, showed moderate severity chronic small vessel ischemic changes, suspected small meningioma along the floor 
[facility]  SPEECH THERAPY  STRZ NEUROSCIENCES 4A  Speech - Language - Cognitive Evaluation + Clinical Swallow Evaluation    Discharge Recommendations: Inpatient Rehabilitation  DIET ORDER RECOMMENDATIONS AFTER EVALUATION: Regular diet, thin liquids (no straw)  STRATEGIES: Set-up with Meals, Full Upright Position, Small Bite/Sip, No Straw, Pulmonary Monitoring, Intermittent Supervision, Medications Whole with Thin, Alternate Solids and Liquids, and Limit Distractions     SLP Individual Minutes  Time In: 1045  Time Out: 1129  Minutes: 44  Timed Code Treatment Minutes: 0 Minutes     Speech, Language, Cognitive Evaluation: 30 minutes   Clinical Swallow Evaluation: 14 minutes    Date: 2025  Patient Name: Lulu Werner      CSN: [id number]   : 1935  (89 y.o.)  Gender: female   Referring Physician:  Chad Andrews DO   Diagnosis: Stroke-like symptoms  Precautions: Fall Risk, Aspiration Risk  History of Present Illness/Injury: Patient admitted with above diagnosis. Per chart review, \"Ms. Lulu Werner is a 89 y.o. 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  She could not move her right leg and arm this morning about 7:30-8 am. At 7 am she was fine, she went to bathroom and laid down. She can not see out of right eye which is chronic for her since 5 years, it has not changed. She had numbness on right side earlier which has resolved now.  Her right sided weakness is improving as well.  No fever or chills. No chest pain or SOB. No abdominal pain, nausea or vomiting. No issues with bm. No issues with urination. No prior hx of CVA.  Patient's speech is little slow per family at bedside.  No ETOH use. No recreational drug use. No tobacco use, former tobacco use.   She presented to outside hospital where she underwent CT head, CTA head and neck.  CT head was negative for acute hemorrhage, showed moderate severity chronic small vessel 
[facility]  OCCUPATIONAL THERAPY MISSED TREATMENT NOTE  STRZ NEUROSCIENCES 4A  4A-12/012-A      Date: 2025  Patient Name: Lulu Werner        CSN: [id number]   : 1935  (89 y.o.)  Gender: female                REASON FOR MISSED TREATMENT: Patient continues to be on strict bedrest due to hypertension. Will check back as able to complete OT evaluation and treatment.                  
[facility]  PHYSICAL THERAPY MISSED TREATMENT NOTE  STRZ NEUROSCIENCES 4A    Date: 2025  Patient Name: Lulu Werner        MRN: [id number]   : 1935  (89 y.o.)  Gender: female                REASON FOR MISSED TREATMENT:  Patient continues to be on strict bedrest due to hypertension. Will check back as able to complete PT evaluation and treatment.       JOSE L HartmanT    
[facility]  PHYSICAL THERAPY MISSED TREATMENT NOTE  STRZ ONC MED 5K    Date: 6/3/2025  Patient Name: Lulu Werner        MRN: [id number]   : 1935  (89 y.o.)  Gender: female   Referring Practitioner: Chad Andrews DO            REASON FOR MISSED TREATMENT:  Missed Treat.      Pt eating breakfast at time of attempt, will try back if able    Per chart review, pt being d/c to IPR today.     
[facility]  INPATIENT OCCUPATIONAL THERAPY  STR NEUROSCIENCES 4A  EVALUATION      Discharge Recommendations: Continue to assess pending progress  Equipment Recommendations:   monitor pending progress       Time In: 1142  Time Out: 1157  Timed Code Treatment Minutes: 0 Minutes  Minutes: 15          Date: 2025  Patient Name: Lulu Werner,   Gender: female      MRN: [id number]  : 1935  (89 y.o.)  Referring Practitioner: Chad Andrews DO  Diagnosis: Stroke-like symptoms  Additional Pertinent Hx: Per MD H & P: 89 y.o. 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     She could not move her right leg and arm this morning about 7:30-8 am. At 7 am she was fine, she went to bathroom and laid down. She can not see out of right eye which is chronic for her since 5 years, it has not changed. She had numbness on right side earlier which has resolved now.  Her right sided weakness is improving as well.  No prior hx of CVA.  Patient's speech is little slow per family at bedside. MRI 2025: Interval deterioration since previous study dated 2025 with new areas of  restricted diffusion in the left subinsular cortex, left parietal subcortical  white matter and right frontal subcortical white matter,  There is diminished  signal intensity on the ADC map consistent with acute infarcts.  2. Mild atrophy and probable ischemic changes in the white matter.  3. Multiple punctate microhemorrhages especially in the left parietal and left  temporal lobes, left basal ganglia and right parietal cortex, unchanged.    Restrictions/Precautions: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 stenosis.  No dissection or occlusion.   LUNG APICES:  Unremarkable as visualized.  HEAD and NECK:   BONES/JOINTS:  Unremarkable.  No discrete lytic or blastic abnormalities.   SOFT TISSUES:  Unremarkable.  CAROTID STENOSIS REFERENCE USING NASCET CRITERIA:   % ICA stenosis = (1 - narrowest ICA diameter/diameter of distal cervical ICA) x 100.   Mild - <50% stenosis.   Moderate - 50-69% stenosis.   Severe - 70-94% stenosis.   Near occlusion - 95-99% stenosis.   Occluded - 100% stenosis.    IMPRESSION:     There is diffuse progressive narrowing of the extracranial left internal carotid artery with severe stenosis of the petrous segment and occlusion of the cavernous and supraclinoid segments. There is collateral reconstitution of the left anterior cerebral artery and left middle cerebral artery. Electronically signed by:  Jeevan Zuniga MD  05/26/2025 11:02 AM EDT RP Workstation: [id number]    CT Head Without Contrast (Stroke)  Result Date: 5/26/2025  EXAM: CT HEAD WITHOUT IV CONTRAST STROKE HISTORY: right sided weakness; stroke alert  rt side weakness; Stroke-like symptoms COMPARISON:  None. TECHNIQUE:  Computerized tomography of the head was performed without contrast material. This exam was performed according to our department optimization program which includes automated exposure control, adjustment of the mA and/or kV according to patient size, and/or use of iterative reconstruction technique. FINDINGS: Skull and scalp:  No acute abnormality. Paranasal sinuses:  The visualized paranasal sinuses and mastoids are aerated. Ventricles and subarachnoid spaces:  No hydrocephalus or subarachnoid hemorrhage. Cerebrum:  No CT evidence of intracranial mass, hemorrhage, or acute territorial infarction.There are involutional changes and chronic microvascular ischemic changes. Cerebellum and brainstem:  No evidence of hemorrhage, acute infarction or mass.

## 2025-06-03 NOTE — PROGRESS NOTES
Admitted to the Inpatient Rehabilitation Unit via wheelchair.  Patient was then oriented to room and unit.  Education provided on the rehabilitation routine: three hours of therapy five days per week.      Explained patients right to have family, representative or physician notified of their admission.  Patient has Declined for physician to be notified.  Patient has Declined for family/representative to be notified.    Admitting medication orders compared with acute stay medications; home medication list reviewed with patient/family.  Medication issues identified No  Medication issue: N/A  If yes, physician notified  N/A .    Vaccination Status  Have you ever received a COVID-19 vaccine? Yes, patient unable to verbalize date, dose, or brand.       Transportation:   Has transportation kept you from medical appointments, meetings, work, or from getting things needed for daily living? (Check all that apply)  No      Health Literacy:   How often do you need to have someone help you when you read instructions, pamphlets, or other written material from your doctor or pharmacy?  0. - Never    Social Isolation:  How often do you feel lonely or isolated from those around you?  0. Never    Patient Mood Interview (PHQ-2 to 9) (from [employer]©)   Say to Patient: \"Over the last 2 weeks, have you been bothered by any of the following problems?\"   If symptom is present, enter yes in column 1 (Symptom Presence)  If yes in column 1, then ask the patient: “About how often have you been bothered by this?” Indicate response in column 2, Symptom Frequency.   Symptom Presence  No    Yes   9.    No response  Symptom Frequency  Never or 1 day  2-6 days (several days)  7-11 days (half or more of the days)  12-14 days (nearly every day)    Symptom Presence Symptom Frequency   Little interest or pleasure in doing things 0. No 0. Never or 1 day   Feeling down, depressed, or hopeless 0. No 0. Never or 1 day   If either A or B above has  symptom frequency coded 2 or 3, CONTINUE asking questions below.      If not, END the interview  and right click on next table to delete.               Pain:  Pain Effect on Sleep    Ask patient: \"Over the past 5 days, how much of the time has pain made it hard for you to sleep at night?\" 1.  Rarely or not at all     If no pain is reported, Stop here. If pain is reported, continue with the additional questions.   Pain Interference with Therapy Activities   Ask patient: \"Over the past 5 days, how often have you limited your participation in rehabilitation therapy sessions due to pain?\" 1.  Rarely or not at all   Pain Interference with Day to Day Activities   Ask patient: \"Over the past 5 days, how often have you limited your day to day activities (excluding rehabilitation therapy sessions) because of pain?\" 1.  Rarely or not at all         Bladder and Bowel Function Assessment:  Prior history of bladder problems: urgency and frequency  Number of pads used per day: 4  Frequency of night time voiding: twice  Fluid intake volume and pattern: 200 ML Fluid Restriction   Last BM: 6/2/2025  Bowel problems (prior or current) No   no   Incontinence   no   Frequent diarrhea   yes   No BM in 3 days this stay or history of constipation   no   Hemorrhoids   no   Diverticulitis   no   Bowel Surgery     Two nurse skin assessment performed by Rylee, LPN  and DEMETRICE Porter .        Weight: 55.52 kg      Care plan was created with patient's input and goals were agreed upon.  Admission folder provided with education regarding patient’s diagnoses, fall prevention, and skin care.  \"Data Collection Information Summary for Patients in Inpatient Rehabilitation Facilities\" and \"Privacy Act Statement - Health Care Records\" provided.  Please refer to the admission navigator for further information.

## 2025-06-03 NOTE — CARE COORDINATION
6/3/25, 9:16 AM EDT    Patient goals/plan/ treatment preferences discussed by  and .  Patient goals/plan/ treatment preferences reviewed with patient/ family.  Patient/ family verbalize understanding of discharge plan and are in agreement with goal/plan/treatment preferences.  Understanding was demonstrated using the teach back method.  AVS provided by RN at time of discharge, which includes all necessary medical information pertaining to the patients current course of illness, treatment, post-discharge goals of care, and treatment preferences.     Services At/After Discharge: Inpatient rehab    Discharge/readmit to [facility], 8k bed 27.    Primary nurse to call report to Ext [phone]
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
## 2025-06-04 LAB
ALBUMIN SERPL BCG-MCNC: 4 G/DL (ref 3.4–4.9)
ALP SERPL-CCNC: 87 U/L (ref 38–126)
ALT SERPL W/O P-5'-P-CCNC: 15 U/L (ref 10–35)
ANION GAP SERPL CALC-SCNC: 14 MEQ/L (ref 8–16)
AST SERPL-CCNC: 30 U/L (ref 10–35)
BASOPHILS ABSOLUTE: 0.1 THOU/MM3 (ref 0–0.1)
BASOPHILS NFR BLD AUTO: 0.9 %
BILIRUB SERPL-MCNC: 0.5 MG/DL (ref 0.3–1.2)
BUN SERPL-MCNC: 22 MG/DL (ref 8–23)
CALCIUM SERPL-MCNC: 9.7 MG/DL (ref 8.8–10.2)
CHLORIDE SERPL-SCNC: 105 MEQ/L (ref 98–111)
CO2 SERPL-SCNC: 21 MEQ/L (ref 22–29)
CREAT SERPL-MCNC: 1.2 MG/DL (ref 0.5–0.9)
DEPRECATED RDW RBC AUTO: 52.4 FL (ref 35–45)
EOSINOPHIL NFR BLD AUTO: 5.1 %
EOSINOPHILS ABSOLUTE: 0.3 THOU/MM3 (ref 0–0.4)
ERYTHROCYTE [DISTWIDTH] IN BLOOD BY AUTOMATED COUNT: 16.2 % (ref 11.5–14.5)
GFR SERPL CREATININE-BSD FRML MDRD: 43 ML/MIN/1.73M2
GLUCOSE SERPL-MCNC: 118 MG/DL (ref 74–109)
HCT VFR BLD AUTO: 44.6 % (ref 37–47)
HGB BLD-MCNC: 14 GM/DL (ref 12–16)
IMM GRANULOCYTES # BLD AUTO: 0.03 THOU/MM3 (ref 0–0.07)
IMM GRANULOCYTES NFR BLD AUTO: 0.5 %
LYMPHOCYTES ABSOLUTE: 0.8 THOU/MM3 (ref 1–4.8)
LYMPHOCYTES NFR BLD AUTO: 14.1 %
MCH RBC QN AUTO: 27.8 PG (ref 26–33)
MCHC RBC AUTO-ENTMCNC: 31.4 GM/DL (ref 32.2–35.5)
MCV RBC AUTO: 88.7 FL (ref 81–99)
MONOCYTES ABSOLUTE: 0.5 THOU/MM3 (ref 0.4–1.3)
MONOCYTES NFR BLD AUTO: 8.3 %
NEUTROPHILS ABSOLUTE: 4.2 THOU/MM3 (ref 1.8–7.7)
NEUTROPHILS NFR BLD AUTO: 71.1 %
NRBC BLD AUTO-RTO: 0 /100 WBC
PLATELET # BLD AUTO: 296 THOU/MM3 (ref 130–400)
PMV BLD AUTO: 9.8 FL (ref 9.4–12.4)
POTASSIUM SERPL-SCNC: 3.8 MEQ/L (ref 3.5–5.2)
PREALB SERPL-MCNC: 17.4 MG/DL (ref 20–40)
PROT SERPL-MCNC: 6.4 G/DL (ref 6.4–8.3)
RBC # BLD AUTO: 5.03 MILL/MM3 (ref 4.2–5.4)
SODIUM SERPL-SCNC: 140 MEQ/L (ref 135–145)
WBC # BLD AUTO: 5.9 THOU/MM3 (ref 4.8–10.8)

## 2025-06-04 PROCEDURE — 36415 COLL VENOUS BLD VENIPUNCTURE: CPT

## 2025-06-04 PROCEDURE — 97110 THERAPEUTIC EXERCISES: CPT

## 2025-06-04 PROCEDURE — 1180000000 HC REHAB R&B

## 2025-06-04 PROCEDURE — 6360000002 HC RX W HCPCS: Performed by: STUDENT IN AN ORGANIZED HEALTH CARE EDUCATION/TRAINING PROGRAM

## 2025-06-04 PROCEDURE — 99232 SBSQ HOSP IP/OBS MODERATE 35: CPT | Performed by: STUDENT IN AN ORGANIZED HEALTH CARE EDUCATION/TRAINING PROGRAM

## 2025-06-04 PROCEDURE — 94761 N-INVAS EAR/PLS OXIMETRY MLT: CPT

## 2025-06-04 PROCEDURE — 6370000000 HC RX 637 (ALT 250 FOR IP): Performed by: STUDENT IN AN ORGANIZED HEALTH CARE EDUCATION/TRAINING PROGRAM

## 2025-06-04 PROCEDURE — 92610 EVALUATE SWALLOWING FUNCTION: CPT

## 2025-06-04 PROCEDURE — 97116 GAIT TRAINING THERAPY: CPT

## 2025-06-04 PROCEDURE — 97530 THERAPEUTIC ACTIVITIES: CPT

## 2025-06-04 PROCEDURE — 92523 SPEECH SOUND LANG COMPREHEN: CPT

## 2025-06-04 PROCEDURE — 85025 COMPLETE CBC W/AUTO DIFF WBC: CPT

## 2025-06-04 PROCEDURE — 97166 OT EVAL MOD COMPLEX 45 MIN: CPT

## 2025-06-04 PROCEDURE — 94669 MECHANICAL CHEST WALL OSCILL: CPT

## 2025-06-04 PROCEDURE — 6370000000 HC RX 637 (ALT 250 FOR IP): Performed by: FAMILY MEDICINE

## 2025-06-04 PROCEDURE — 97535 SELF CARE MNGMENT TRAINING: CPT

## 2025-06-04 PROCEDURE — 97162 PT EVAL MOD COMPLEX 30 MIN: CPT

## 2025-06-04 PROCEDURE — 84134 ASSAY OF PREALBUMIN: CPT

## 2025-06-04 PROCEDURE — 80053 COMPREHEN METABOLIC PANEL: CPT

## 2025-06-04 RX ADMIN — ENOXAPARIN SODIUM 30 MG: 100 INJECTION SUBCUTANEOUS at 08:42

## 2025-06-04 RX ADMIN — DILTIAZEM HYDROCHLORIDE 120 MG: 120 CAPSULE, EXTENDED RELEASE ORAL at 08:43

## 2025-06-04 RX ADMIN — LATANOPROST 1 DROP: 50 SOLUTION OPHTHALMIC at 21:02

## 2025-06-04 RX ADMIN — ATORVASTATIN CALCIUM 80 MG: 80 TABLET, FILM COATED ORAL at 21:01

## 2025-06-04 RX ADMIN — DOCUSATE SODIUM 100 MG: 100 CAPSULE, LIQUID FILLED ORAL at 08:42

## 2025-06-04 RX ADMIN — Medication 1 TABLET: at 08:43

## 2025-06-04 RX ADMIN — ASPIRIN 325 MG: 325 TABLET, COATED ORAL at 08:42

## 2025-06-04 RX ADMIN — FAMOTIDINE 20 MG: 20 TABLET, FILM COATED ORAL at 08:42

## 2025-06-04 RX ADMIN — CLOPIDOGREL BISULFATE 75 MG: 75 TABLET, FILM COATED ORAL at 08:42

## 2025-06-04 RX ADMIN — METOPROLOL TARTRATE 75 MG: 50 TABLET, FILM COATED ORAL at 08:43

## 2025-06-04 RX ADMIN — Medication 1000 MG: at 08:44

## 2025-06-04 RX ADMIN — POLYETHYLENE GLYCOL 3350 17 G: 17 POWDER, FOR SOLUTION ORAL at 08:42

## 2025-06-04 RX ADMIN — TRIAMCINOLONE ACETONIDE: 1 CREAM TOPICAL at 08:44

## 2025-06-04 RX ADMIN — POTASSIUM CHLORIDE 10 MEQ: 1500 TABLET, EXTENDED RELEASE ORAL at 08:42

## 2025-06-04 RX ADMIN — METOPROLOL TARTRATE 75 MG: 50 TABLET, FILM COATED ORAL at 21:01

## 2025-06-04 NOTE — PLAN OF CARE
Problem: Discharge Planning  Goal: Discharge to home or other facility with appropriate resources  2025 1539 by Marcia Boyd LISW-S  Note:   [facility]  Physical Medicine Case Management Assessment    [x] Inpatient Rehabilitation Unit    Patient Name: Lulu Werner        MRN: [id number]    : 1935  (89 y.o.)  Gender: female   Date of Admission: 6/3/2025  3:11 PM    Family/Social/Home Environment:   Prior to admission, patient was living alone. Patient was independent at home. Patient was completing her ADLs, housekeeping, meal prep, errands, finances and driving. Patient is retired. Patient's main support includes Nba (son), Katie (daughter in law) and Biju (son). Patient's children and grandchildren all live locally and are able to provide assistance at discharge. Biju, Nba and Katie are all retired and willing to make a schedule to provide assistance at home. Patient was previously seen by Dr Perez in [place], [place], but has not been assigned a new physician. Patient's family physician is [facility]. Patient has a walker at home. Patient is motivated to participate in therapy.    Social/Functional History  Lives With: Alone  Type of Home: House  Home Layout: Two level, Performs ADL's on one level, Able to Live on Main level with bedroom/bathroom  Home Access: Stairs to enter with rails  Entrance Stairs - Number of Steps: 3 CATHERINE at front and garage entrance  Entrance Stairs - Rails: Right  Bathroom Shower/Tub: Walk-in shower  Bathroom Toilet: Standard  Bathroom Equipment: None  Bathroom Accessibility: Accessible  Home Equipment: None  Has the patient had two or more falls in the past year or any fall with injury in the past year?: Yes  Prior Level of Assist for ADLs: Independent  Prior Level of Assist for Homemaking: Independent  Prior Level of Assist for Ambulation: Independent household ambulator, with or without device, Independent community ambulator, with or without  Planning  Living Arrangements: Alone  Support Systems: Children, Family Members  Potential Assistance Needed: Outpatient PT/OT  M2B Y/N: Yes  Type of Home Care Services: PT, OT, Aide Services, Nursing Services  Patient expects to be discharged to:: House  Expected Discharge Date:  (Undetermined)      SAEED Duran 6/4/2025 3:39 PM
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
## 2025-06-04 NOTE — PLAN OF CARE
Problem: Skin/Tissue Integrity  Goal: Skin integrity remains intact  Description: 1.  Monitor for areas of redness and/or skin breakdown2.  Assess vascular access sites hourly3.  Every 4-6 hours minimum:  Change oxygen saturation probe site4.  Every 4-6 hours:  If on nasal continuous positive airway pressure, respiratory therapy assess nares and determine need for appliance change or resting period  6/3/2025 2233 by Carmelo Eduardo, RN  Outcome: Progressing  Flowsheets (Taken 6/3/2025 2015)  Skin Integrity Remains Intact: Monitor for areas of redness and/or skin breakdown     Problem: Safety - Adult  Goal: Free from fall injury  6/3/2025 2233 by Carmelo Eduardo, RN  Outcome: Progressing     Problem: ABCDS Injury Assessment  Goal: Absence of physical injury  6/3/2025 2233 by Carmelo Eduardo RN  Outcome: Progressing     Problem: Pain  Goal: Verbalizes/displays adequate comfort level or baseline comfort level  Outcome: Progressing  Flowsheets  Taken 6/3/2025 2015 by Carmelo Eduardo, RN  Verbalizes/displays adequate comfort level or baseline comfort level:   Encourage patient to monitor pain and request assistance   Assess pain using appropriate pain scale  Taken 6/3/2025 1525 by Tipton, Rylee, LPN  Verbalizes/displays adequate comfort level or baseline comfort level:   Encourage patient to monitor pain and request assistance   Assess pain using appropriate pain scale   Administer analgesics based on type and severity of pain and evaluate response   Implement non-pharmacological measures as appropriate and evaluate response     Problem: Chronic Conditions and Co-morbidities  Goal: Patient's chronic conditions and co-morbidity symptoms are monitored and maintained or improved  6/3/2025 2233 by Carmelo Eduardo, RN  Outcome: Progressing

## 2025-06-04 NOTE — PROGRESS NOTES
[facility]  SPEECH THERAPY  [facility] 8K IP REHAB  Speech - Language - Cognitive Evaluation + Clinical Swallow Evaluation    Discharge Recommendations: Outpatient Speech Therapy vs Home Health  DIET ORDER RECOMMENDATIONS AFTER EVALUATION: Regular diet and thin liquids  STRATEGIES: Set-up with Meals, Full Upright Position, Small Bite/Sip, No Straw, Pulmonary Monitoring, Intermittent Supervision, Alternate Solids and Liquids, Limit Distractions, and Monitor for Fatigue     SLP Individual Minutes  Time In: 1135  Time Out: 1205  Minutes: 30  Timed Code Treatment Minutes: 0 Minutes     Speech, Language, Cognitive Evaluation: 21 minutes  Clinical Swallow Evaluation: 9 minutes    Date: 2025  Patient Name: Lulu Werner      CSN: [id number]   : 1935  (89 y.o.)  Gender: female   Referring Physician:  Pushpa Redd DO  Diagnosis: Acute cerebrovascular accident (CVA) (HCC)  Precautions: fall precautions  History of Present Illness/Injury: Patient admitted to [facility] with above diagnosis: see physician H&P for further information. Per chart review, \"Lulu Werner  is a 89 y.o. female with PMH significant for trial fibrillation, glaucoma, vitreous hemorrhage of right eye, CHF, macular degeneration, and HTN  who is being admitted to the inpatient rehabilitation unit on 6/3/2025. History obtained via: ED documentation, acute care documentation, and patient.     Patient initially presented to [place] ED for evaluation of left-sided weakness, headache and decreased vision out of her right eye.  Due to concern for stroke, patient was transferred to [facility] ED via [facility].  Last known normal was the prior night.  CTh was reportedly negative for acute hemorrhage but reportedly showed moderate severity chronic small vessel ischemic changes, suspected small meningioma in the floor of the right anterior cranial fossa.  CT of the head and neck reportedly revealed occluded cavernous segment of the left ICA,

## 2025-06-04 NOTE — PROGRESS NOTES
[facility]  Recreational Therapy  Daily Note  [facility] 8K IP REHAB    Time Spent with Patient: 10 minutes    Date:  6/4/2025       Patient Name: Lulu Werner      MRN: [id number]      YOB: 1935 (89 y.o.)       Gender: female     Referring Practitioner: Pushpa Redd DO    RESTRICTIONS/PRECAUTIONS:  Restrictions/Precautions: Fall Risk, General Precautions     Hearing: Within functional limits    PAIN: 0    SUBJECTIVE:  I love dogs    OBJECTIVE:  Pt enjoyed petting our pet therapy dog May this afternoon while visiting with her son and daughter in law-they paid RT for pt to get her hair washed and styled tomorrow -affect bright and social          Patient Education  New Education Provided: Importance of Leisure,     Electronically signed by: Any Muller, CTRS  Date: 6/4/2025
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
## 2025-06-04 NOTE — PLAN OF CARE
Problem: Chronic Conditions and Co-morbidities  Goal: Patient's chronic conditions and co-morbidity symptoms are monitored and maintained or improved  6/4/2025 1038 by Janet Gautam RN  Outcome: Progressing  Flowsheets (Taken 6/4/2025 0830)  Care Plan - Patient's Chronic Conditions and Co-Morbidity Symptoms are Monitored and Maintained or Improved: Monitor and assess patient's chronic conditions and comorbid symptoms for stability, deterioration, or improvement  6/4/2025 1031 by Janet Gautam RN  Outcome: Progressing  Flowsheets (Taken 6/4/2025 0830)  Care Plan - Patient's Chronic Conditions and Co-Morbidity Symptoms are Monitored and Maintained or Improved: Monitor and assess patient's chronic conditions and comorbid symptoms for stability, deterioration, or improvement  6/4/2025 1031 by Janet Gautam RN  Outcome: Progressing  Flowsheets (Taken 6/4/2025 0830)  Care Plan - Patient's Chronic Conditions and Co-Morbidity Symptoms are Monitored and Maintained or Improved: Monitor and assess patient's chronic conditions and comorbid symptoms for stability, deterioration, or improvement  6/3/2025 2233 by Carmelo Eduardo RN  Outcome: Progressing     Problem: Discharge Planning  Goal: Discharge to home or other facility with appropriate resources  6/4/2025 1038 by Janet Gautam RN  Outcome: Progressing  Flowsheets (Taken 6/4/2025 0830)  Discharge to home or other facility with appropriate resources: Identify barriers to discharge with patient and caregiver  6/4/2025 1031 by Janet Gautam RN  Outcome: Progressing  Flowsheets (Taken 6/4/2025 0830)  Discharge to home or other facility with appropriate resources: Identify barriers to discharge with patient and caregiver  6/4/2025 1031 by Janet Gautam RN  Outcome: Progressing  Flowsheets (Taken 6/4/2025 0830)  Discharge to home or other facility with appropriate resources: Identify barriers to discharge with patient and caregiver  6/3/2025 2233

## 2025-06-04 NOTE — CARE COORDINATION
Patient is alert and oriented, with moderate expressive aphasia. Re-oriented on the use of the call light and reminded patient to use the call light always. Patient takes her pills whole. No pain or discomfort noted. V/S within limits.

## 2025-06-04 NOTE — PROGRESS NOTES
Received telephone call from [employer] to verify patient date of admission.  At this time the Nurse stated that because patient admitted on 6/3/2025 the next review date will be 6/10/2025.  NATHALIA Medina updated, RAMILA Navigator updated.

## 2025-06-04 NOTE — PROGRESS NOTES
[facility]  INPATIENT REHABILITATION  TEAM CONFERENCE NOTE    Conference Date: 2025  Admit Date:  6/3/2025  3:11 PM  Patient Name: Lulu Werner    MRN: [id number]    : 1935  (89 y.o.)  Rehabilitation Admitting Diagnosis:  Stroke (cerebrum) ([facility]) [I63.9]  Acute cerebrovascular accident (CVA) ([facility]) [I63.9]  Referring Practitioner: Pushpa Redd DO      CASE MANAGEMENT  Current issues/needs regarding patient and family discharge status: Prior to admission, patient was living alone. Patient was independent at home. Patient was completing her ADLs, housekeeping, meal prep, errands, finances and driving. Patient is retired. Patient's main support includes Nba (son), Katie (daughter in law) and Biju (son). Patient's children and grandchildren all live locally and are able to provide assistance at discharge. Biju, Nba and Katie are all retired and willing to make a schedule to provide assistance at home. Patient was previously seen by Dr Perez in [place], [place], but has not been assigned a new physician. Patient's family physician is [facility]. Patient has a walker at home. Patient is motivated to participate in therapy.     PHYSICAL THERAPY  Pt is an 89 y/o female with diagnosis of an Acute CVA. See above for details regarding current condition and PMH. Pt previously lived alone in a one-level home with 3 CATHERINE. Pt was previously independent for all ADLs and functional mobility tasks. Upon PT evaluation, pt is Independent to A for bed mobility and CGA for transfers, ambulation, and stairs. Pt demonstrates impairments of decreased strength, gait deviations, decreased endurance, impaired balance, functional mobility deficits, and is functioning below baseline. Pt would benefit from skilled PT to address these impairments and return to PLOF.  Equipment Needed:  (Continue to assess for equipment needs)    SPEECH THERAPY  Clinical Swallow Evaluation: Patient presents with mild oral dysphagia

## 2025-06-04 NOTE — CARE COORDINATION
Patient up with assist x 1 with walker and gait belt, tolerated well. Patient sat on edge of bed, educated patient to use call light when getting out of bed.  Patient continent of bladder and bowel. Patient participated in therapies.

## 2025-06-04 NOTE — PROGRESS NOTES
Patient: Lulu Werner  Unit/Bed: 8K-05/005-A  YOB: 1935  MRN: [id number] Acct: [id number]   Admitting Diagnosis: Stroke (cerebrum) (HCC) [I63.9]  Acute cerebrovascular accident (CVA) (HCC) [I63.9]  Admit Date:  6/3/2025  Hospital Day: 1    Assessment:     Principal Problem:    Acute cerebrovascular accident (CVA) (HCC)  Resolved Problems:    * No resolved hospital problems. *      Plan:     Potassium started yesterday and will follow  Follow the BP for now.        Subjective:     Patient has no complaint of CP or SOB..   Medication side effects: none    Scheduled Meds:   dilTIAZem  120 mg Oral Daily    famotidine  20 mg Oral Daily    multivitamin  1 tablet Oral Daily    fish oil  1 capsule Oral Daily    potassium chloride  10 mEq Oral Daily    triamcinolone   Topical BID    latanoprost  1 drop Both Eyes Nightly    sodium chloride flush  5-40 mL IntraVENous 2 times per day    atorvastatin  80 mg Oral Nightly    clopidogrel  75 mg Oral Daily    aspirin  325 mg Oral Daily    metoprolol tartrate  75 mg Oral BID    enoxaparin  30 mg SubCUTAneous Daily    senna  1 tablet Oral Nightly    docusate sodium  100 mg Oral Daily    polyethylene glycol  17 g Oral Daily    [START ON 6/21/2025] aspirin  81 mg Oral Daily     Continuous Infusions:   sodium chloride       PRN Meds:sodium chloride flush, sodium chloride, melatonin, ondansetron, acetaminophen, magnesium hydroxide, docusate sodium    Review of Systems  Pertinent items are noted in HPI.    Objective:     Patient Vitals for the past 8 hrs:   BP Temp Temp src Pulse Resp SpO2   06/04/25 0830 (!) 134/93 97.9 °F (36.6 °C) Oral 74 18 96 %     I/O last 3 completed shifts:  In: 400 [P.O.:400]  Out: -   No intake/output data recorded.    BP (!) 134/93   Pulse 74   Temp 97.9 °F (36.6 °C) (Oral)   Resp 18   Ht 1.524 m (5')   Wt 55.5 kg (122 lb 6.4 oz)   SpO2 96%   BMI 23.90 kg/m²     General appearance: alert, appears stated age, and cooperative  Head:  Normocephalic, without obvious abnormality, atraumatic  Lungs: clear to auscultation bilaterally  Heart: regular rate and rhythm, S1, S2 normal, no murmur, click, rub or gallop  Abdomen: soft, non-tender; bowel sounds normal; no masses,  no organomegaly  Extremities: extremities normal, atraumatic, no cyanosis or edema  Skin: Skin color, texture, turgor normal. No rashes or lesions  Neurologic:  weak    Electronically signed by Rene Rouse MD on 6/4/2025 at 12:25 PM

## 2025-06-04 NOTE — PROGRESS NOTES
[facility]  Recreational Therapy  Inpatient Rehabilitation Evaluation        Time Spent with Patient: 23 minutes    Date:  6/4/2025       Patient Name: Lulu Werner      MRN: [id number]       YOB: 1935 (89 y.o.)       Gender: female     Referring Practitioner: Pushpa Redd DO    RESTRICTIONS/PRECAUTIONS:  Restrictions/Precautions: Fall Risk, General Precautions          PAIN: 0-resting in recliner     SUBJECTIVE:  pt lives alone in [place]-she has 4 sons with 3 living close by and 1 in [place]-could not for sure tell me how many grandchildren or great grandchildren she has    VISION:  Visual Impairment-R eye no vision-L eye decreased-needs large print but does not do much reading anymore    HEARING: Hard of Hearing-slight     LEISURE INTERESTS:   Pt use to love to garden-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 and gets a few groceries-asked pt if she uses the microwave at home for her meals and she stated yes but then stated she needs a new microwave-states she has been thinking about getting some help into the home with cleaning-she watches tv-pt has expressive aphasia -pt pleasant-is not interested in any leisure materials for in room use-she would like to get her hair washed and styled by our beautician tomorrow-she looked in her purse but did not have any money -RT will cover the cost of her hair tomorrow    BARRIERS TO LEISURE INTERESTS:    Communication deficit, Decreased endurance, and Impaired vision           Patient Education  New Education Provided: Importance of Leisure, RT Plan of Care    Plan:  Continue to follow patient through this admission  Include patient in appropriate groups  See patient individually    Electronically signed by: Any Muller, CTRS  Date: 6/4/2025

## 2025-06-04 NOTE — PROGRESS NOTES
assessment  Type of Goal: New goal     Nutrition Monitoring and Evaluation:   Food/Nutrient Intake Outcomes: Progression of Nutrition, Diet Advancement/Tolerance, Food and Nutrient Intake, Supplement Intake  Physical Signs/Symptoms Outcomes: Biochemical Data, Chewing or Swallowing, GI Status, Nausea or Vomiting, Fluid Status or Edema, Hemodynamic Status, Nutrition Focused Physical Findings, Skin, Weight    Discharge Planning:    Too soon to determine     Blossom Vallejo RD, LD  Contact: [phone]

## 2025-06-04 NOTE — PLAN OF CARE
Individualized Plan of Care  [facility] Inpatient Rehabilitation Unit    Rehabilitation physician: Dr. Redd    Admit Date: 6/3/2025     Rehabilitation Diagnosis: Stroke (cerebrum) ([facility]) [I63.9]  Acute cerebrovascular accident (CVA) ([facility]) [I63.9]      Rehabilitation impairments: self care, mobility, motor dysfunction, bowel/bladder management, pain management, safety, and communication    Factors facilitating achievement of predicted outcomes: Family support, Motivated, Cooperative, and Pleasant  Barriers to the achievement of predicted outcomes: Communication deficit, Decreased endurance, Decreased sensation, and Decreased proprioception    Patient Goals: Improve independence with mobility, Improvement of mobility at a wheelchair level, Increase overall strength and endurance, Increase balance, Increase endurance, Increase independence with activities of daily living, Improve cognition, Increase self-awareness, Increase safety awareness, Increase community integration, Increase socialization, Functional communication with caregivers, Integrate appropriate pain management plan, Assure adequate nutritional option for discharge, Continence of bowel and bladder, and Provide appropriate patient and family education      NURSING:  Nursing goals for Lulu Werner while on the rehabilitation unit will include:  Continence of bowel and bladder, Adequate number of bowel movements, Urinate with no urinary retention >300ml in bladder, Maintain O2 SATs at an acceptable level during stay, Effective pain management while on the rehabilitation unit, Establish adequate pain control plan for discharge, Absence of skin breakdown while on the rehabilitation unit, Improved skin integrity via assessments including wound measurements, Avoidance of any hospital acquired infections, No signs/symptoms of infection at the wound site, Freedom from injury during hospitalization, and Complete education with patient/family  with understanding demonstrated regarding disease process and resultant impairment     In order to achieve these goals, nursing interventions may include bowel/bladder training, education for medical assistive devices, medication education, O2 saturation management, energy conservation, stress management techniques, fall prevention, alarms protocol, seating and positioning, skin/wound care, pressure relief instruction, dressing changes, infection protection, DVT prophylaxis, assistance with safe transfers , and/or assistance with bathroom activities and hygiene.       PHYSICAL THERAPY:  Goals:        Short Term Goals  Time Frame for Short Term Goals: 1 week from eval  Short Term Goal 1: Pt will perform supine<> sit transfer without use of bedrails and no more than Mod I in order to get into/out of bed at home  Short Term Goal 2: Pt will perform sit<>stand transfer with use of RW and no more than supervision to be able to transfer to/from various surfaces  Short Term Goal 3: Pt will be able to perform transfer in<>out of car with use of RW and no more than supervision in order to meet transportation needs  Short Term Goal 4: Pt will be able to ambulate 35ft with use of RW and no more than supervision in order to ambulate household distances safely  Short Term Goal 5: Pt will be able to ascend/descend 3 stairs with use of amadeo handrail and no more than supervision in order to enter/exit the home safely  Long Term Goals  Time Frame for Long Term Goals : 3 weeks  Long Term Goal 1: Pt will perform supine<> sit transfer independently without use of bedrails in order to get into/out of bed at home  Long Term Goal 2: Pt will perform sit<>stand transfer independently with use of RW to be able to transfer to/from various surfaces  Long Term Goal 3: Pt will be able to perform transfer in<>out of car independently with use of RW in order to meet transportation needs  Long Term Goal 4: Pt will be able to ambulate 35ft

## 2025-06-04 NOTE — PROGRESS NOTES
Patient/Caregiver education & training, Gait training, Stair training, Coordination training.  See long-term goal time frame for expected duration of plan of care.  If no long-term goals established, a short length of stay is anticipated.    Goals:  Patient goals : Pt did not state goals  Short Term Goals  Time Frame for Short Term Goals: 1 week  Short Term Goal 1: Pt. to tolerate 5 minutes of standing with Supervision with 1-2 hand release to improve activity tolerance for ADL completion.  Short Term Goal 2: Pt. to retrieve/transport at least 2 items with Supervision to maximize performance with self care prep.  Short Term Goal 3: Pt to open/close 4/5 containers with supervision to maximize performance with grooming routines.  Short Term Goal 4: Pt to sequence through simple meal prep task with 3 or more instructions with SBA min cues to improve engagement in IADLs.  Long Term Goals  Time Frame for Long Term Goals : 2 weeks from OT eval on the IPR unit  Long Term Goal 1: Pt. to demo improved occupational performance as evidenced by a score of 80/100 on the Modified Barthel Index.  Long Term Goal 2: Pt. to complete simple IADL routines with Supervision to progress to PLOF.  Long Term Goal 3: Pt to demo Mod I to transfer to/from toilet to enhance performance with ADL transfers.         Following session, patient left in safe position in chair, with alarm, and call light within reach

## 2025-06-04 NOTE — PROGRESS NOTES
[facility]  Individualized Disclosure Statement      Patient: Lulu Werner      Scope of Service  [facility] provides 24 hour individualized service to patients with functional limitations due to, but not limited to: stroke, brain injury, spinal cord injury, major multiple trauma, fractures, amputation, and neurological disorders. The Rehabilitation Center provides rehabilitative nursing and medical services as well as physical, occupational, speech, and recreation therapies.  [facility] is fully accredited by the Commission on Accreditation of Rehabilitation Facilities (CARF) as a comprehensive provider of rehabilitation services.  Patients admitted to the [facility] receive a minimum of three hours of therapy per day, at least five days per week, with a revised therapy schedule on weekends and holidays.  Physical therapy, occupational therapy, and speech therapy are provided seven days per week including holidays.  Other therapeutic services are available on weekends and evenings as needed or scheduled.  Intensity of Treatment  Your treatment program will consist of Nursing Care and:  1.5 hours of Physical Therapy, per day  1.5 hours of Occupational Therapy, per day   30-60 minutes of Speech Therapy, per day  1 hour of Recreational Therapy, per week  Depending on your needs, the exact time spent with each of the above disciplines may fluctuate, however you will receive at least 3 hours of therapy at least 5 days per week.    [facility] maintains contracts with most insurance plans.  Depending on the type of coverage, the insurance may impose limits on the coverage for rehabilitation care.  Coverage is based on the premise that you are able to fully participate in the rehabilitation program and show continued progress. Please verify your own insurance information. A  copy of this was given to the patient/ family on this date.  Insurance Coverage  Your insurance company has made the following determination relative to the length of your stay:   Your estimated length of stay is 14 days   Your insurance Coverage has been verified as follows:    Primary Insurance: Payor: [facility] MEDICARE /  /  /    Deductible: $[id number] Coverage: Active  Secondary Insurance:  Secondary insurance policies often cover co-pay amounts, but to ensure payment please contact your insurance company.    Alternative Resources: Please ask the  for more information [phone]

## 2025-06-04 NOTE — PLAN OF CARE
Problem: Chronic Conditions and Co-morbidities  Goal: Patient's chronic conditions and co-morbidity symptoms are monitored and maintained or improved  6/4/2025 1031 by Janet Gautam RN  Outcome: Progressing  Flowsheets (Taken 6/4/2025 0830)  Care Plan - Patient's Chronic Conditions and Co-Morbidity Symptoms are Monitored and Maintained or Improved: Monitor and assess patient's chronic conditions and comorbid symptoms for stability, deterioration, or improvement  6/4/2025 1031 by Janet Gautam RN  Outcome: Progressing  Flowsheets (Taken 6/4/2025 0830)  Care Plan - Patient's Chronic Conditions and Co-Morbidity Symptoms are Monitored and Maintained or Improved: Monitor and assess patient's chronic conditions and comorbid symptoms for stability, deterioration, or improvement  6/3/2025 2233 by Carmelo Eduardo RN  Outcome: Progressing     Problem: Discharge Planning  Goal: Discharge to home or other facility with appropriate resources  6/4/2025 1031 by Janet Gautam RN  Outcome: Progressing  Flowsheets (Taken 6/4/2025 0830)  Discharge to home or other facility with appropriate resources: Identify barriers to discharge with patient and caregiver  6/4/2025 1031 by Janet Gautam RN  Outcome: Progressing  Flowsheets (Taken 6/4/2025 0830)  Discharge to home or other facility with appropriate resources: Identify barriers to discharge with patient and caregiver  6/3/2025 2233 by Carmelo Eduardo, RN  Outcome: Progressing  Flowsheets (Taken 6/3/2025 2015)  Discharge to home or other facility with appropriate resources: Identify barriers to discharge with patient and caregiver     Problem: Skin/Tissue Integrity  Goal: Skin integrity remains intact  Description: 1.  Monitor for areas of redness and/or skin breakdown2.  Assess vascular access sites hourly3.  Every 4-6 hours minimum:  Change oxygen saturation probe site4.  Every 4-6 hours:  If on nasal continuous positive airway pressure, respiratory

## 2025-06-04 NOTE — PROGRESS NOTES
[facility]  INPATIENT PHYSICAL THERAPY  EVALUATION  -STRZ 8K IP REHAB - [id number]-[id number]    Discharge Recommendations: Continue to assess pending progress, Therapy recommended at discharge  Equipment Recommendations:  (Continue to assess for equipment needs)             Time In: 1003  Time Out: 1133  Timed Code Treatment Minutes: 75 Minutes  Minutes: 90        Date: 2025  Patient Name: Lulu Werner,  Gender:  female        MRN: [id number]  : 1935  (89 y.o.)      Referring Practitioner: Pushpa Redd DO  Diagnosis: Acute cerebrovascular accident (CVA) (HCC)  Additional Pertinent Hx: Per EMR: \"Acute cerebrovascular accident (CVA) (HCC)  Additional Pertinent Hx: Lulu Collins whom presented to the ED from [facility] via [facility] with 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  MRI indicated interval deteiroation since previous study with new areas of restricted diffusion in L subinsular cortex, L parietal subcortical white matter and R frontal subcortical white mater.  It is noted repeat MRI indicated L hemispheric CVAs secondary to the L carotid occlusion.  Hospital course complicated by a-fib RVR, UTI, edema in RUE as superficial thrombophlebitis present. See Dr. Redd H&P for additional informtion. Pt admitted to the IPR unit on 6/3/25 for further medical care and referred to skilled PT services at this time.\"     Resrictions/Precautions:  Restrictions/Precautions: Fall Risk, General Precautions           Subjective:  Chart Reviewed: Yes  Patient assessed for rehabilitation services?: Yes  Family/Caregiver Present: No  Subjective: Pt is seated in bedside chair when PT arrives. Pt is agreeable to therapy this date. Pt requests to use bathroom x 1 during today's PT session.    General:  Overall Orientation Status: Impaired  Orientation Level: Disoriented to time, Disoriented to place, Oriented to person, Disoriented to situation  Cognition  transfer independently without use of bedrails in order to get into/out of bed at home  Long Term Goal 2: Pt will perform sit<>stand transfer independently with use of RW to be able to transfer to/from various surfaces  Long Term Goal 3: Pt will be able to perform transfer in<>out of car independently with use of RW in order to meet transportation needs  Long Term Goal 4: Pt will be able to ambulate 35ft independently with use of RW in order to ambulate household distances safely  Long Term Goal 5: Pt will be able to ambulate 200ft independently with use of RW in order to ambulate safely in the community  Additional Goals?: Yes  Long term goal 6: Pt will be able to ascend/descend 3 stairs independently and with use of single handrail in order to enter/exit the home safely  Long term goal 7: Pt will improve score of Tinetti Balance assessment by 6 points in order to meet EVETTE and decrease fall risk    Following session, patient left seated in bedside chair with alarm activated. Tray table and call light were left within reach.    Betsy Still, licensed Therapist was present and directly responsible for all treatments provided by, Anitra Judge, SPT

## 2025-06-05 PROCEDURE — 99232 SBSQ HOSP IP/OBS MODERATE 35: CPT | Performed by: STUDENT IN AN ORGANIZED HEALTH CARE EDUCATION/TRAINING PROGRAM

## 2025-06-05 PROCEDURE — 92507 TX SP LANG VOICE COMM INDIV: CPT

## 2025-06-05 PROCEDURE — 97530 THERAPEUTIC ACTIVITIES: CPT

## 2025-06-05 PROCEDURE — 97116 GAIT TRAINING THERAPY: CPT

## 2025-06-05 PROCEDURE — 6370000000 HC RX 637 (ALT 250 FOR IP): Performed by: FAMILY MEDICINE

## 2025-06-05 PROCEDURE — 6360000002 HC RX W HCPCS: Performed by: STUDENT IN AN ORGANIZED HEALTH CARE EDUCATION/TRAINING PROGRAM

## 2025-06-05 PROCEDURE — 6370000000 HC RX 637 (ALT 250 FOR IP): Performed by: STUDENT IN AN ORGANIZED HEALTH CARE EDUCATION/TRAINING PROGRAM

## 2025-06-05 PROCEDURE — 97535 SELF CARE MNGMENT TRAINING: CPT

## 2025-06-05 PROCEDURE — 1180000000 HC REHAB R&B

## 2025-06-05 PROCEDURE — 97112 NEUROMUSCULAR REEDUCATION: CPT

## 2025-06-05 RX ORDER — METOPROLOL TARTRATE 100 MG/1
100 TABLET ORAL 2 TIMES DAILY
Status: DISCONTINUED | OUTPATIENT
Start: 2025-06-05 | End: 2025-06-14 | Stop reason: HOSPADM

## 2025-06-05 RX ADMIN — ENOXAPARIN SODIUM 30 MG: 100 INJECTION SUBCUTANEOUS at 10:14

## 2025-06-05 RX ADMIN — FAMOTIDINE 20 MG: 20 TABLET, FILM COATED ORAL at 10:13

## 2025-06-05 RX ADMIN — POTASSIUM CHLORIDE 10 MEQ: 1500 TABLET, EXTENDED RELEASE ORAL at 10:13

## 2025-06-05 RX ADMIN — METOPROLOL 100 MG: 100 TABLET ORAL at 19:31

## 2025-06-05 RX ADMIN — METOPROLOL TARTRATE 75 MG: 50 TABLET, FILM COATED ORAL at 10:13

## 2025-06-05 RX ADMIN — DOCUSATE SODIUM 100 MG: 100 CAPSULE, LIQUID FILLED ORAL at 10:13

## 2025-06-05 RX ADMIN — DILTIAZEM HYDROCHLORIDE 120 MG: 120 CAPSULE, EXTENDED RELEASE ORAL at 10:12

## 2025-06-05 RX ADMIN — ASPIRIN 325 MG: 325 TABLET, COATED ORAL at 10:12

## 2025-06-05 RX ADMIN — ATORVASTATIN CALCIUM 80 MG: 80 TABLET, FILM COATED ORAL at 19:30

## 2025-06-05 RX ADMIN — TRIAMCINOLONE ACETONIDE: 1 CREAM TOPICAL at 10:11

## 2025-06-05 RX ADMIN — LATANOPROST 1 DROP: 50 SOLUTION OPHTHALMIC at 19:31

## 2025-06-05 RX ADMIN — Medication 1 TABLET: at 10:13

## 2025-06-05 RX ADMIN — Medication 1000 MG: at 10:13

## 2025-06-05 RX ADMIN — POLYETHYLENE GLYCOL 3350 17 G: 17 POWDER, FOR SOLUTION ORAL at 10:14

## 2025-06-05 RX ADMIN — CLOPIDOGREL BISULFATE 75 MG: 75 TABLET, FILM COATED ORAL at 10:13

## 2025-06-05 NOTE — PROGRESS NOTES
Patient: Lulu Werner  Unit/Bed: 8-05/005-A  YOB: 1935  MRN: [id number] Acct: [id number]   Admitting Diagnosis: Stroke (cerebrum) (HCC) [I63.9]  Acute cerebrovascular accident (CVA) (HCC) [I63.9]  Admit Date:  6/3/2025  Hospital Day: 2    Assessment:     Principal Problem:    Acute cerebrovascular accident (CVA) (HCC)  Resolved Problems:    * No resolved hospital problems. *      Plan:     Increase the metoprolol for better BP control.        Subjective:     Patient has no complaint of CP or SOB..   Medication side effects: none    Scheduled Meds:   metoprolol tartrate  100 mg Oral BID    dilTIAZem  120 mg Oral Daily    famotidine  20 mg Oral Daily    multivitamin  1 tablet Oral Daily    fish oil  1 capsule Oral Daily    potassium chloride  10 mEq Oral Daily    triamcinolone   Topical BID    latanoprost  1 drop Both Eyes Nightly    sodium chloride flush  5-40 mL IntraVENous 2 times per day    atorvastatin  80 mg Oral Nightly    clopidogrel  75 mg Oral Daily    aspirin  325 mg Oral Daily    enoxaparin  30 mg SubCUTAneous Daily    senna  1 tablet Oral Nightly    docusate sodium  100 mg Oral Daily    polyethylene glycol  17 g Oral Daily    [START ON 6/21/2025] aspirin  81 mg Oral Daily     Continuous Infusions:   sodium chloride       PRN Meds:sodium chloride flush, sodium chloride, melatonin, ondansetron, acetaminophen, magnesium hydroxide, docusate sodium    Review of Systems  Pertinent items are noted in HPI.    Objective:     Patient Vitals for the past 8 hrs:   BP Temp Temp src Pulse Resp SpO2   06/05/25 1930 -- -- -- 78 -- --   06/05/25 1915 133/78 98.5 °F (36.9 °C) Oral -- 16 98 %     I/O last 3 completed shifts:  In: 700 [P.O.:700]  Out: -   No intake/output data recorded.    /78   Pulse 78   Temp 98.5 °F (36.9 °C) (Oral)   Resp 16   Ht 1.524 m (5')   Wt 55.5 kg (122 lb 6.4 oz)   SpO2 98%   BMI 23.90 kg/m²     General appearance: alert, appears stated age, and cooperative  Head:

## 2025-06-05 NOTE — PROGRESS NOTES
[facility]  INPATIENT SPEECH THERAPY  MH-STRZ 8K IP REHAB  DAILY NOTE    Discharge Recommendations:  Continue to assess pending progress  DIET ORDER RECOMMENDATIONS: Regular diet, thin liquids  STRATEGIES: Full Upright Position, Small Bite/Sip, and Alternate Solids and Liquids     SLP Individual Minutes  Time In: 0730  Time Out: 0800  Minutes: 30  Timed Code Treatment Minutes: 0 Minutes       Date: 2025  Patient Name: Lulu Werner      CSN: [id number]   : 1935  (89 y.o.)  Gender: female   Referring Physician:  Pushpa Redd DO   Diagnosis: Acute cerebrovascular accident (CVA) (HCC)  Precautions: Fall risk   Current Diet: Regular diet, thin liquids   Respiratory Status: Room Air  Date of Last MBS/FEES: Not Applicable    Pain:  No pain reported.    Subjective:  Patient sitting upright in recliner upon ST arrival. Alert and cooperative.     Short-Term Goals:  SHORT TERM GOAL #1:  Goal 1: Patient will consume regular solids/thin liquids with stable pulmonary status and incorporation of trained compensatory strategies to assist with nutrition/hydration measures  INTERVENTIONS:DNT due to focus on other STGs.     SHORT TERM GOAL #2:  Goal 2: Considerations for an instrumental evaluation should adverse changes be conveyed in direct relation to the safety/efficency of the swallow mechanism.  INTERVENTIONS: Not clinically indicated at this time.     SHORT TERM GOAL #3:  Goal 3: Patient will complete verbal expression tasks (including BASIC naming, automatic speech tasks, biographics, picture description, verbal fluency, word/phrase level repetition) with 60% accuracy, max cues to improve functional expressive communication.  INTERVENTIONS:  Confrontational Namin/10 independent, 3/10 direct repetition     Automatic Speech:   VIGNESH: cues x1  Months: cues x1   Happy Birthday: cues x1  *cues required for task initiation, independent thereafter    Verbal Fluency/description: \"Describe the weather  no restrictions    EDUCATION:  Learner: Patient  Education:  Education Related to Stroke Diagnosis, Reviewed ST goals and Plan of Care, Reviewed recommendations for follow-up, and Education Related to Potential Risks and Complications Due to Impairment/Illness/Injury  Evaluation of Education: Verbalizes understanding    ASSESSMENT/PLAN:  Activity Tolerance:  Patient tolerance of  treatment: good.      Assessment/Plan: Patient progressing toward established goals.  Continues to require skilled care of licensed speech pathologist to progress toward achievement of established goals and plan of care..     Plan for Next Session: speech/language tx      Kelly Singh M.A., CCC-SLP [id number]

## 2025-06-05 NOTE — PROGRESS NOTES
Patient received sacramental anointing by a . If you are in need of  support, please call [phone]. If you are in need of a  after 6:30pm, please call the house supervisor for the on-call .      Shelia Aden  [facility] Health Coordinator  [phone]

## 2025-06-05 NOTE — PLAN OF CARE
Problem: Chronic Conditions and Co-morbidities  Goal: Patient's chronic conditions and co-morbidity symptoms are monitored and maintained or improved  6/5/2025 1044 by Rolan Pham LPN  Outcome: Progressing     Problem: Discharge Planning  Goal: Discharge to home or other facility with appropriate resources  6/5/2025 1044 by Rolan Pham LPN  Outcome: Progressing     Problem: Skin/Tissue Integrity  Goal: Skin integrity remains intact  Description: 1.  Monitor for areas of redness and/or skin breakdown2.  Assess vascular access sites hourly3.  Every 4-6 hours minimum:  Change oxygen saturation probe site4.  Every 4-6 hours:  If on nasal continuous positive airway pressure, respiratory therapy assess nares and determine need for appliance change or resting period  6/5/2025 1044 by Rolan Pham LPN  Outcome: Progressing     Problem: Safety - Adult  Goal: Free from fall injury  6/5/2025 1044 by Rolan Pham LPN  Outcome: Progressing     Problem: Pain  Goal: Verbalizes/displays adequate comfort level or baseline comfort level  6/5/2025 1044 by Rolan Pham LPN  Outcome: Progressing  Flowsheets (Taken 6/5/2025 1000)  Verbalizes/displays adequate comfort level or baseline comfort level:   Encourage patient to monitor pain and request assistance   Assess pain using appropriate pain scale

## 2025-06-05 NOTE — PROGRESS NOTES
Occupational Therapy  [facility]  MH-STRZ 8K IP REHAB  Occupational Therapy  Daily Note    Discharge Recommendations: Patient would benefit from continued OT at discharge  Equipment Recommendations:   Continue to address/confirm what equipment pt owns as appropriate/able.    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  MRI indicated interval deteiroation since previous study with new areas of restricted diffusion in L subinsular cortex, L parietal subcortical white matter and R frontal subcortical white mater.  It is noted repeat MRI indicated L hemispheric CVAs secondary to the L carotid occlusion.  Hospital course complicated by a-fib RVR, UTI, edema in RUE as superficial thrombophlebitis present. See Dr. Redd H&P for additional informtion. Pt admitted to the IPR unit on 6/3/25 for further medical care and referred to skilled OT services at this time.    Restrictions/Precautions:  Restrictions/Precautions: Fall Risk, General Precautions     Social/Functional History:  Lives With: Alone  Type of Home: House  Home Layout: Two level, Performs ADL's on one level, Able to Live on Main level with bedroom/bathroom  Home Access: Stairs to enter with rails  Entrance Stairs - Number of Steps: 3 CATHERINE at front and garage entrance  Entrance Stairs - Rails: Right  Home Equipment: None   Bathroom Shower/Tub: Walk-in shower  Bathroom Toilet: Standard  Bathroom Equipment: None  Bathroom Accessibility: Accessible       Prior Level of Assist for ADLs:  Pt looks in sink for cooking spray and in drawers for bottles of water. Pt also retrieves Tupperware container and states it is cooking spray. Pt also requires assist for safe walker mgmt while maneuvering around countertops.     TRANSFERS:  Sit to Stand:  Stand By Assistance.    Stand to Sit: Contact Guard Assistance, cues for hand placement.      FUNCTIONAL MOBILITY:  Assistive Device: Rolling Walker  Assist Level:  Contact Guard Assistance.   Distance: Household distances within unit  Pt demos functional mobility 6 steps from w/c to chair with no AD and CGA. Slight unsteadiness noted.         Modified [place]:  Current Functional Status:  +4 - Moderately severe disability; unable to walk and attend to bodily needs without assistance.   Patient could not live alone and could not walk from one room to another without physical help from another person, but they can sit up in bed without any help.  Education provided regarding stroke rehabilitation management.    Education:  Learners: Patient  ADL's, IADL's, Home Safety, Importance of Increasing Activity, Fall Prevention, and Assistive Device Safety    ASSESSMENT:     Activity Tolerance:  Patient tolerance of  treatment: Fair treatment tolerance, Limited by fatigue, Reduced activity pace, and Need for increased rest breaks      Plan: Times Per Week: 5x/wk for 60 minutes  Current Treatment Recommendations: Balance training, Functional mobility training, Self-Care / ADL, Safety education & training, Endurance training, ROM, Strengthening, Neuromuscular re-education, Cognitive reorientation, Equipment evaluation, education, & procurement, Home management training, Modalities, Positioning, Wheelchair mobility training, Patient/Caregiver education & training, Gait training, Stair training, Coordination training    Goals  Short Term Goals  Time Frame for Short Term Goals: 1 week  Short Term Goal 1: Pt. to tolerate 5 minutes of standing with Supervision with 1-2 hand release

## 2025-06-05 NOTE — PROGRESS NOTES
Physical Medicine & Rehabilitation   Progress Note    Chief Complaint:  CVA    Subjective: Patient seen independently this morning and will be seen on rounds with SAEED Duran,  following patient's inpatient Rehab Team Conference . Patient seen sitting alone in room. No family at bedside. When asked how she is feeling she states \"I don't know\". She denies any pain. Communication remains limited by her aphasia      Rehabilitation:PT, OT, and SLP updates will be reviewed during team rounds. See separate note.         Review of Systems:  CONSTITUTIONAL:  negative for  fevers and chills  EYES:  positive for blindness in right eye  RESPIRATORY: Negative for dyspnea or wheezing  CARDIOVASCULAR:  negative for chest pain  GASTROINTESTINAL:  negative for nausea and vomiting  GENITOURINARY:  negative for dysuria  SKIN:  negative for rash  MUSCULOSKELETAL:  negative for  pain  NEUROLOGICAL:  positive for speech problems and weakness  System review otherwise negative      Objective:  BP (!) 148/98   Pulse 78   Temp 98.1 °F (36.7 °C) (Oral)   Resp 14   Ht 1.524 m (5')   Wt 55.5 kg (122 lb 6.4 oz)   SpO2 98%   BMI 23.90 kg/m²   Patient is awake and alert, sitting up in bedside chair. No family at bedside  Orientation: limited due to aphasia  Mood: within normal limits  Affect: calm  General appearance: Patient is well nourished, well developed, well groomed and in no acute distress     Memory: Unable to assess given aphasia  Attention/Concentration: Unable to assess given aphasia  Language: Mixed expressive> receptive aphasia.     Cranial Nerves: Some mild right-sided facial asymmetry.  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

## 2025-06-05 NOTE — PROGRESS NOTES
[facility]  INPATIENT SPEECH THERAPY  MH-STRZ 8K IP REHAB  DAILY NOTE    Discharge Recommendations:  Continue to assess pending progress  DIET ORDER RECOMMENDATIONS: Regular diet, thin liquids  STRATEGIES: Full Upright Position, Small Bite/Sip, and Alternate Solids and Liquids     SLP Individual Minutes  Time In: 1030  Time Out: 1100  Minutes: 30  Timed Code Treatment Minutes: 0 Minutes       Date: 2025  Patient Name: Lulu Werner      CSN: [id number]   : 1935  (89 y.o.)  Gender: female   Referring Physician:  Pushpa Redd DO   Diagnosis: Acute cerebrovascular accident (CVA) (HCC)  Precautions: Fall risk   Current Diet: Regular diet, thin liquids   Respiratory Status: Room Air  Date of Last MBS/FEES: Not Applicable    Pain:  No pain reported.    Subjective:  Patient sitting upright in recliner upon ST arrival. Alert and cooperative.     Short-Term Goals:  SHORT TERM GOAL #1:  Goal 1: Patient will consume regular solids/thin liquids with stable pulmonary status and incorporation of trained compensatory strategies to assist with nutrition/hydration measures  INTERVENTIONS:DNT due to focus on other STGs.     SHORT TERM GOAL #2:  Goal 2: Considerations for an instrumental evaluation should adverse changes be conveyed in direct relation to the safety/efficency of the swallow mechanism.  INTERVENTIONS: Not clinically indicated at this time.     SHORT TERM GOAL #3:  Goal 3: Patient will complete verbal expression tasks (including BASIC naming, automatic speech tasks, biographics, picture description, verbal fluency, word/phrase level repetition) with 60% accuracy, max cues to improve functional expressive communication.  INTERVENTIONS: DNT due to focus on other STGs.     SHORT TERM GOAL #4:  Goal 4: Patient will complete auditory/reading comprehension (including direction following of 1-2 step commands, BASIC yes/questions, functional reading comprehension, Object ID froma FO3) with 70%

## 2025-06-05 NOTE — PROGRESS NOTES
[facility]  INPATIENT PHYSICAL THERAPY  DAILY NOTE  MH-STRZ 8K IP REHAB - 8K--A      Discharge Recommendations: Patient would benefit from continued PT at discharge  Equipment Recommendations:  (Continue to assess for equipment needs)               Time In: 902  Time Out: 1005  Timed Code Treatment Minutes: 63 Minutes  Minutes: 63          Date: 2025  Patient Name: Lulu Werner,  Gender:  female        MRN: [id number]  : 1935  (89 y.o.)     Referring Practitioner: Pushpa Redd DO  Diagnosis: Acute cerebrovascular accident (CVA) (HCC)  Additional Pertinent Hx: Per EMR: \"Acute cerebrovascular accident (CVA) (HCC)  Additional Pertinent Hx: Lulu Collins whom presented to the ED from [facility] with 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  MRI indicated interval deteiroation since previous study with new areas of restricted diffusion in L subinsular cortex, L parietal subcortical white matter and R frontal subcortical white mater.  It is noted repeat MRI indicated L hemispheric CVAs secondary to the L carotid occlusion.  Hospital course complicated by a-fib RVR, UTI, edema in RUE as superficial thrombophlebitis present. See Dr. Redd H&P for additional informtion. Pt admitted to the IPR unit on 6/3/25 for further medical care and referred to skilled PT services at this time.\"     Prior Level of Function:  Lives With: Alone  Type of Home: House  Home Layout: Two level, Performs ADL's on one level, Able to Live on Main level with bedroom/bathroom  Home Access: Stairs to enter with rails  Entrance Stairs - Number of Steps: 3 CATHERINE at front and garage entrance  Entrance Stairs - Rails: Right  Home Equipment: None   Bathroom Shower/Tub: Walk-in shower  Bathroom Toilet: Standard  Bathroom Equipment: None  Bathroom Accessibility: Accessible    Prior Level of Assist for ADLs: Independent  Prior Level of Assist for  Balance: Contact Guard Assistance  Dynamic Standing Balance: Contact Guard Assistance, Minimal Assistance    Neuromuscular re-education:  Patient completed foot and hand taps to blaze pods on table and ground and 4\" step.  Blaze pods on table set up on the right to increase right visual scanning.  Pt requiring significant verbal and visual cues for scanning to right initially, improving throughout task.  Pt expresses this activity is difficult  Patient completed step ups with right LE and descended with left LE first to increase proprioception, hip/quad stability and weight bearing through right LE. 4\" step  Patient stood on first 4\" step and instructed to tap left foot to third step and then left foot back to ground x10 reps.  *completed to improve lateral weight shift, hip/quad stability, coordination and proprioception and visual scanning to reduce her risk for falls and increase independence with functional mobility    Exercise:  None    Functional Outcome Measures:  Not completed  Modified [place]:  Current Functional Status:  +3 - Moderate disability; requiring some help, but able to walk without physical assistance (SBA/CGA).   Patient could not live alone but could walk from one room to another without physical help from another person.  Education provided regarding stroke rehabilitation management.    ASSESSMENT:  Assessment: Patient progressing toward established goals.  Activity Tolerance:  Patient tolerance of  treatment:Good.  Plan: Current Treatment Recommendations: Strengthening, Balance training, Functional mobility training, Transfer training, Endurance training, Gait training, Stair training, Neuromuscular re-education, Home exercise program, Safety education & training, Patient/Caregiver education & training, Equipment evaluation, education, & procurement, Therapeutic activities  General Plan:  (60min/day x 5 days)    Education:  Learners: Patient  Patient Education: Transfers, Gait, Verbal

## 2025-06-05 NOTE — PLAN OF CARE
6/4/2025 0754)  Verbalizes/displays adequate comfort level or baseline comfort level:   Encourage patient to monitor pain and request assistance   Assess pain using appropriate pain scale   Administer analgesics based on type and severity of pain and evaluate response   Implement non-pharmacological measures as appropriate and evaluate response   Outcome: Progressing  Free From Fall Injury:   Instruct family/caregiver on patient safety   Based on caregiver fall risk screen, instruct family/caregiver to ask for assistance with transferring infant if caregiver noted to have fall risk factors

## 2025-06-05 NOTE — PLAN OF CARE
Problem: Discharge Planning  Goal: Discharge to home or other facility with appropriate resources  2025 1614 by Marcia Boyd LISW-S  Note:   [facility]  Physical Medicine and Rehabilitation  Post Team Conference Note    Date: 2025  Patient Name: Lulu Werner  MRN: [id number]  : 1935 (89 y.o.)    IPR Team Conference was held on Thursday, . Recommendations of the team were explained to the patient by Dr Redd and NATHALIA. Team is recommending that patient continue on acute inpatient rehab for PT, OT and ST, with an expected discharge date of Saturday, . Following discharge, team is recommending home health with RN, PT, OT, ST and HHA. Patient would like to pursue home health at discharge. Care plan reviewed with patient. Patient verbalized understanding or the plan of care and contributed to goal setting.     NATHALIA spoke with patient's son, Nba, on this date to update him on IPR team conference and discharge plans. Nba is in agreement with patient's progress and discharge plan. Nba is requesting a family meeting with NATHALIA next week to discuss plans. No outstanding needs at this time.    SW to follow and maintain involvement in discharge planning.    Plan  Referrals Needed: Home Health  Family Training: NATHALIA to schedule at a later time  Driving Recommendations: Driving Evaluation

## 2025-06-05 NOTE — PROGRESS NOTES
[facility]  Recreational Therapy  Daily Note  MH-STRZ 8K IP REHAB    Time Spent with Patient: 30 minutes    Date:  6/5/2025       Patient Name: Lulu Werner      MRN: [id number]      YOB: 1935 (89 y.o.)       Gender: female     Referring Practitioner: Pushpa Redd DO    RESTRICTIONS/PRECAUTIONS:  Restrictions/Precautions: Fall Risk, General Precautions     Hearing: Within functional limits    PAIN: 0    SUBJECTIVE:  I want it cut short    OBJECTIVE:  Pt came to the beauty shop and enjoyed getting her hair washed, cut and [id number] her hair cut short today-appreciative          Patient Education  New Education Provided: Importance of Leisure,     Electronically signed by: Any Muller, CTRS  Date: 6/5/2025

## 2025-06-05 NOTE — CARE COORDINATION
Patient was seen walking from her chair to the bed without staff, the virtual  was initiated, and education provided to the patient. The patient was asking questions on why this was needed, and this nurse reassured her this was for her safety and she appeared to be ok with it. At times she will not make sense of what she is saying, and the next sentence she will have fluent speech. Went to and from the bathroom with staff, with a warm blanket when in bed. Her call light is within reach, a side light is on for virtual  to see her, bed alarm is on,and call light is within her reach.    0

## 2025-06-05 NOTE — PROGRESS NOTES
Physical Medicine & Rehabilitation   Progress Note    Chief Complaint:  CVA    Subjective: Patient seen and examined. No acute events overnight.  She reports poor sleep but with aphasia is unable to describe. She denies any pain. No reports of any significant changes to bowel or bladder. Last documented BM was on 6/5. She continues to tolerate and therapies.       Rehabilitation:  PT 6/5/2025:  Transfers:  Sit to Stand: Contact Guard Assistance  Stand to Sit:Contact Guard Assistance     Ambulation:  Contact Guard Assistance  Distance: 92', 225', 10'  Surface: Level Tile  Device: Rolling Walker  Gait Deviations: Decreased Gait Speed, Decreased Heel Strike Bilaterally, and Mild Path Deviations   *Pt lightly tapping objects on right side of walker at times, verbal cues at times for negotiation and attention to the right side, decreased attention to right  *Pt demonstrates decreased attention to right side, lightly bumping into object     Patient instructed to ambulate and locate cones placed on right and left side of area of therapy gym.  Pt with difficulty scanning both directions equally, keeping head to one side while ambulating rather than looking both ways.  Verbal cues for visual scanning.  Pt needing to ambulate 3 laps through area to locate all 6 cones.       Stairs:  Not Tested     Balance:  Static Standing Balance: Contact Guard Assistance  Dynamic Standing Balance: Contact Guard Assistance, Minimal Assistance     Neuromuscular re-education:  Patient completed foot and hand taps to blaze pods on table and ground and 4\" step.  Blaze pods on table set up on the right to increase right visual scanning.  Pt requiring significant verbal and visual cues for scanning to right initially, improving throughout task.  Pt expresses this activity is difficult  Patient completed step ups with right LE and descended with left LE first to increase proprioception, hip/quad stability and weight bearing through right LE. 4\"

## 2025-06-06 LAB
ANION GAP SERPL CALC-SCNC: 11 MEQ/L (ref 8–16)
BASOPHILS ABSOLUTE: 0 THOU/MM3 (ref 0–0.1)
BASOPHILS NFR BLD AUTO: 0.8 %
BUN SERPL-MCNC: 19 MG/DL (ref 8–23)
CALCIUM SERPL-MCNC: 9.7 MG/DL (ref 8.8–10.2)
CHLORIDE SERPL-SCNC: 105 MEQ/L (ref 98–111)
CO2 SERPL-SCNC: 22 MEQ/L (ref 22–29)
CREAT SERPL-MCNC: 1.1 MG/DL (ref 0.5–0.9)
DEPRECATED RDW RBC AUTO: 52 FL (ref 35–45)
EOSINOPHIL NFR BLD AUTO: 4.5 %
EOSINOPHILS ABSOLUTE: 0.2 THOU/MM3 (ref 0–0.4)
ERYTHROCYTE [DISTWIDTH] IN BLOOD BY AUTOMATED COUNT: 16 % (ref 11.5–14.5)
GFR SERPL CREATININE-BSD FRML MDRD: 48 ML/MIN/1.73M2
GLUCOSE SERPL-MCNC: 106 MG/DL (ref 74–109)
HCT VFR BLD AUTO: 40.2 % (ref 37–47)
HGB BLD-MCNC: 12.8 GM/DL (ref 12–16)
IMM GRANULOCYTES # BLD AUTO: 0.04 THOU/MM3 (ref 0–0.07)
IMM GRANULOCYTES NFR BLD AUTO: 0.8 %
LYMPHOCYTES ABSOLUTE: 0.7 THOU/MM3 (ref 1–4.8)
LYMPHOCYTES NFR BLD AUTO: 13.2 %
MCH RBC QN AUTO: 28.2 PG (ref 26–33)
MCHC RBC AUTO-ENTMCNC: 31.8 GM/DL (ref 32.2–35.5)
MCV RBC AUTO: 88.5 FL (ref 81–99)
MONOCYTES ABSOLUTE: 0.5 THOU/MM3 (ref 0.4–1.3)
MONOCYTES NFR BLD AUTO: 10.7 %
NEUTROPHILS ABSOLUTE: 3.6 THOU/MM3 (ref 1.8–7.7)
NEUTROPHILS NFR BLD AUTO: 70 %
NRBC BLD AUTO-RTO: 0 /100 WBC
PLATELET # BLD AUTO: 276 THOU/MM3 (ref 130–400)
PMV BLD AUTO: 10.3 FL (ref 9.4–12.4)
POTASSIUM SERPL-SCNC: 4.2 MEQ/L (ref 3.5–5.2)
RBC # BLD AUTO: 4.54 MILL/MM3 (ref 4.2–5.4)
SODIUM SERPL-SCNC: 138 MEQ/L (ref 135–145)
WBC # BLD AUTO: 5.1 THOU/MM3 (ref 4.8–10.8)

## 2025-06-06 PROCEDURE — 6370000000 HC RX 637 (ALT 250 FOR IP): Performed by: STUDENT IN AN ORGANIZED HEALTH CARE EDUCATION/TRAINING PROGRAM

## 2025-06-06 PROCEDURE — 85025 COMPLETE CBC W/AUTO DIFF WBC: CPT

## 2025-06-06 PROCEDURE — 6370000000 HC RX 637 (ALT 250 FOR IP): Performed by: FAMILY MEDICINE

## 2025-06-06 PROCEDURE — 80048 BASIC METABOLIC PNL TOTAL CA: CPT

## 2025-06-06 PROCEDURE — 6360000002 HC RX W HCPCS: Performed by: STUDENT IN AN ORGANIZED HEALTH CARE EDUCATION/TRAINING PROGRAM

## 2025-06-06 PROCEDURE — 97530 THERAPEUTIC ACTIVITIES: CPT

## 2025-06-06 PROCEDURE — 97112 NEUROMUSCULAR REEDUCATION: CPT

## 2025-06-06 PROCEDURE — 36415 COLL VENOUS BLD VENIPUNCTURE: CPT

## 2025-06-06 PROCEDURE — 92507 TX SP LANG VOICE COMM INDIV: CPT

## 2025-06-06 PROCEDURE — 1180000000 HC REHAB R&B

## 2025-06-06 PROCEDURE — 99232 SBSQ HOSP IP/OBS MODERATE 35: CPT | Performed by: STUDENT IN AN ORGANIZED HEALTH CARE EDUCATION/TRAINING PROGRAM

## 2025-06-06 PROCEDURE — 97110 THERAPEUTIC EXERCISES: CPT

## 2025-06-06 PROCEDURE — 97116 GAIT TRAINING THERAPY: CPT

## 2025-06-06 RX ADMIN — FAMOTIDINE 20 MG: 20 TABLET, FILM COATED ORAL at 07:58

## 2025-06-06 RX ADMIN — POTASSIUM CHLORIDE 10 MEQ: 1500 TABLET, EXTENDED RELEASE ORAL at 07:57

## 2025-06-06 RX ADMIN — Medication 1 TABLET: at 07:56

## 2025-06-06 RX ADMIN — SENNOSIDES 8.6 MG: 8.6 TABLET, FILM COATED ORAL at 20:19

## 2025-06-06 RX ADMIN — TRIAMCINOLONE ACETONIDE: 1 CREAM TOPICAL at 20:22

## 2025-06-06 RX ADMIN — LATANOPROST 1 DROP: 50 SOLUTION OPHTHALMIC at 20:18

## 2025-06-06 RX ADMIN — METOPROLOL 100 MG: 100 TABLET ORAL at 20:19

## 2025-06-06 RX ADMIN — Medication 1000 MG: at 07:56

## 2025-06-06 RX ADMIN — ASPIRIN 325 MG: 325 TABLET, COATED ORAL at 07:58

## 2025-06-06 RX ADMIN — METOPROLOL 100 MG: 100 TABLET ORAL at 07:56

## 2025-06-06 RX ADMIN — DILTIAZEM HYDROCHLORIDE 120 MG: 120 CAPSULE, EXTENDED RELEASE ORAL at 07:56

## 2025-06-06 RX ADMIN — CLOPIDOGREL BISULFATE 75 MG: 75 TABLET, FILM COATED ORAL at 07:58

## 2025-06-06 RX ADMIN — ENOXAPARIN SODIUM 30 MG: 100 INJECTION SUBCUTANEOUS at 08:01

## 2025-06-06 RX ADMIN — TRIAMCINOLONE ACETONIDE: 1 CREAM TOPICAL at 07:59

## 2025-06-06 RX ADMIN — POLYETHYLENE GLYCOL 3350 17 G: 17 POWDER, FOR SOLUTION ORAL at 08:00

## 2025-06-06 RX ADMIN — ATORVASTATIN CALCIUM 80 MG: 80 TABLET, FILM COATED ORAL at 20:19

## 2025-06-06 RX ADMIN — DOCUSATE SODIUM 100 MG: 100 CAPSULE, LIQUID FILLED ORAL at 08:00

## 2025-06-06 NOTE — CARE COORDINATION
Up with 1 assist via walker. With tele sitter in the room. Patient is confused at times, forgetting to use the call light. Time void initiated q2-3 hours. Patient is continent during the shift. No pain or other concern noted.

## 2025-06-06 NOTE — PROGRESS NOTES
[facility]  Diagnosis List for Inpatient Rehab facility (IRF) - Patient Assessment Instrument (CARMEN)    Patient Name: Lulu Werner        MRN: [id number]    : 1935  (89 y.o.)  Gender: female     Primary impairment requiring rehabilitation: 1.2 CVA, Right body involvement     Etiologic Diagnosis that led to the condition: Acute infarcts of the left subinsular cortex, left parietal subcortical white matter, and right frontal subcortical white matter    Comorbid conditions affecting rehabilitation:  CVA: Acute infarcts of the left subinsular cortex, left parietal subcortical white matter, and right frontal subcortical white matter   Expressive aphasia  Dysphagia  Right hemiparesis  Proteus UTI  Superficial thrombophlebitis  A-fib s/p Watchman  HTN  HLD  GERD  Macular degeneration  Vitamin D deficiency  HFpEF  CHRISTY     Electronically signed by Pushpa Redd DO on 2025 at 8:21 AM

## 2025-06-06 NOTE — PROGRESS NOTES
[facility]  INPATIENT PHYSICAL THERAPY  DAILY NOTE  [facility] 8K IP REHAB - 8K--A      Discharge Recommendations: Patient would benefit from continued PT at discharge  Equipment Recommendations:  (Continue to assess for equipment needs)               Time In: 1330  Time Out: 1430  Timed Code Treatment Minutes: 60 Minutes  Minutes: 60          Date: 2025  Patient Name: Lulu Werner,  Gender:  female        MRN: [id number]  : 1935  (89 y.o.)     Referring Practitioner: Pushpa Redd DO  Diagnosis: Acute cerebrovascular accident (CVA) (HCC)  Additional Pertinent Hx: Per EMR: \"Acute cerebrovascular accident (CVA) (HCC)  Additional Pertinent Hx: Lulu Collins whom presented to the ED from [facility] via [facility] with 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  MRI indicated interval deteiroation since previous study with new areas of restricted diffusion in L subinsular cortex, L parietal subcortical white matter and R frontal subcortical white mater.  It is noted repeat MRI indicated L hemispheric CVAs secondary to the L carotid occlusion.  Hospital course complicated by a-fib RVR, UTI, edema in RUE as superficial thrombophlebitis present. See Dr. Redd H&P for additional informtion. Pt admitted to the IPR unit on 6/3/25 for further medical care and referred to skilled PT services at this time.\"     Prior Level of Function:  Lives With: Alone  Type of Home: House  Home Layout: Two level, Performs ADL's on one level, Able to Live on Main level with bedroom/bathroom  Home Access: Stairs to enter with rails  Entrance Stairs - Number of Steps: 3 CATHERINE at front and garage entrance  Entrance Stairs - Rails: Right  Home Equipment: Cane, Walker - Rolling   Bathroom Shower/Tub: Walk-in shower  Bathroom Toilet: Standard  Bathroom Equipment: None  Bathroom Accessibility: Accessible    Prior Level of Assist for ADLs: Independent  Prior Level of

## 2025-06-06 NOTE — PLAN OF CARE
Problem: Chronic Conditions and Co-morbidities  Goal: Patient's chronic conditions and co-morbidity symptoms are monitored and maintained or improved  6/6/2025 1110 by Garima Mcmahon RN  Outcome: Progressing  Flowsheets (Taken 6/5/2025 1915 by Carmelo Eduardo, RN)  Care Plan - Patient's Chronic Conditions and Co-Morbidity Symptoms are Monitored and Maintained or Improved: Monitor and assess patient's chronic conditions and comorbid symptoms for stability, deterioration, or improvement  6/5/2025 2322 by Carmelo Eduardo, RN  Outcome: Progressing  Flowsheets (Taken 6/5/2025 1915)  Care Plan - Patient's Chronic Conditions and Co-Morbidity Symptoms are Monitored and Maintained or Improved: Monitor and assess patient's chronic conditions and comorbid symptoms for stability, deterioration, or improvement     Problem: Discharge Planning  Goal: Discharge to home or other facility with appropriate resources  6/6/2025 1110 by Garima Mcmahon RN  Outcome: Progressing  Flowsheets (Taken 6/5/2025 1915 by Carmelo Eduardo, RN)  Discharge to home or other facility with appropriate resources: Identify barriers to discharge with patient and caregiver  6/5/2025 2322 by Carmelo Eduardo RN  Outcome: Progressing  Flowsheets (Taken 6/5/2025 1915)  Discharge to home or other facility with appropriate resources: Identify barriers to discharge with patient and caregiver     Problem: Skin/Tissue Integrity  Goal: Skin integrity remains intact  Description: 1.  Monitor for areas of redness and/or skin breakdown2.  Assess vascular access sites hourly3.  Every 4-6 hours minimum:  Change oxygen saturation probe site4.  Every 4-6 hours:  If on nasal continuous positive airway pressure, respiratory therapy assess nares and determine need for appliance change or resting period  6/6/2025 1110 by Garima Mcmahon, RN  Outcome: Progressing  Flowsheets (Taken 6/5/2025 2300 by Carmelo Eduardo, RN)  Skin Integrity

## 2025-06-06 NOTE — PLAN OF CARE
Problem: Discharge Planning  Goal: Discharge to home or other facility with appropriate resources  6/5/2025 2322 by Carmelo Eduardo, RN  Outcome: Progressing  Flowsheets (Taken 6/5/2025 1915)  Discharge to home or other facility with appropriate resources: Identify barriers to discharge with patient and caregiver     Problem: Skin/Tissue Integrity  Goal: Skin integrity remains intact  Description: 1.  Monitor for areas of redness and/or skin breakdown2.  Assess vascular access sites hourly3.  Every 4-6 hours minimum:  Change oxygen saturation probe site4.  Every 4-6 hours:  If on nasal continuous positive airway pressure, respiratory therapy assess nares and determine need for appliance change or resting period  6/5/2025 2322 by Carmelo Eduardo, RN  Outcome: Progressing  Flowsheets  Taken 6/5/2025 2300  Skin Integrity Remains Intact: Monitor for areas of redness and/or skin breakdown  Taken 6/5/2025 1915  Skin Integrity Remains Intact: Monitor for areas of redness and/or skin breakdown     Problem: Safety - Adult  Goal: Free from fall injury  6/5/2025 2322 by Carmelo Eduardo, RN  Outcome: Progressing     Problem: ABCDS Injury Assessment  Goal: Absence of physical injury  6/5/2025 2322 by Carmelo Eduardo, RN  Outcome: Progressing     Problem: Chronic Conditions and Co-morbidities  Goal: Patient's chronic conditions and co-morbidity symptoms are monitored and maintained or improved  6/5/2025 2322 by Carmelo Eduardo, RN  Outcome: Progressing  Flowsheets (Taken 6/5/2025 1915)  Care Plan - Patient's Chronic Conditions and Co-Morbidity Symptoms are Monitored and Maintained or Improved: Monitor and assess patient's chronic conditions and comorbid symptoms for stability, deterioration, or improvement

## 2025-06-06 NOTE — PROGRESS NOTES
Formerly named [facility]  INPATIENT SPEECH THERAPY  [facility] 8K IP REHAB  DAILY NOTE    Discharge Recommendations:  Continue to assess pending progress  DIET ORDER RECOMMENDATIONS: Regular diet, thin liquids  STRATEGIES: Full Upright Position, Small Bite/Sip, and Alternate Solids and Liquids     SLP Individual Minutes  Time In: 0700  Time Out: 0730  Minutes: 30  Timed Code Treatment Minutes: 0 Minutes       Date: 2025  Patient Name: Lulu Werner      CSN: [id number]   : 1935  (89 y.o.)  Gender: female   Referring Physician:  Pushpa Redd DO   Diagnosis: Acute cerebrovascular accident (CVA) (HCC)  Precautions: Fall risk   Current Diet: Regular diet, thin liquids   Respiratory Status: Room Air  Date of Last MBS/FEES: Not Applicable    Pain:  Headache- no rate given - RN notified     Subjective:  Patient sitting upright in bed  upon ST arrival. Alert and cooperative.     Short-Term Goals:  SHORT TERM GOAL #1:  Goal 1: Patient will consume regular solids/thin liquids with stable pulmonary status and incorporation of trained compensatory strategies to assist with nutrition/hydration measures  INTERVENTIONS:DNT due to focus on other STGs.     SHORT TERM GOAL #2:  Goal 2: Considerations for an instrumental evaluation should adverse changes be conveyed in direct relation to the safety/efficency of the swallow mechanism.  INTERVENTIONS: Not clinically indicated at this time.     SHORT TERM GOAL #3:  Goal 3: Patient will complete verbal expression tasks (including BASIC naming, automatic speech tasks, biographics, picture description, verbal fluency, word/phrase level repetition) with 60% accuracy, max cues to improve functional expressive communication.  INTERVENTIONS:   Biographics:  Name: independent  Birthday: independent use of visual aid ST provided on previous date     Body Part Naming:  Hand: FO2  Foot: min cues  Knee: independent  Shoulder: total assist  Belly: independent   Nose: FO2    Verbal  Intake: 7.  Total oral intake with no restrictions    EDUCATION:  Learner: Patient  Education:  Education Related to Stroke Diagnosis, Reviewed ST goals and Plan of Care, Reviewed recommendations for follow-up, and Education Related to Potential Risks and Complications Due to Impairment/Illness/Injury  Evaluation of Education: Verbalizes understanding, Needs further instruction, and Family not present    ASSESSMENT/PLAN:  Activity Tolerance:  Patient tolerance of  treatment: good.      Assessment/Plan: Patient progressing toward established goals.  Continues to require skilled care of licensed speech pathologist to progress toward achievement of established goals and plan of care..     Plan for Next Session: speech/language tx      Kelly Singh M.A., CCC-SLP [id number]

## 2025-06-06 NOTE — PROGRESS NOTES
[facility] [place]  INPATIENT SPEECH THERAPY  [facility] 8K IP REHAB  DAILY NOTE    Discharge Recommendations:  Continue to assess pending progress  DIET ORDER RECOMMENDATIONS: Regular diet, thin liquids  STRATEGIES: Full Upright Position, Small Bite/Sip, and Alternate Solids and Liquids     SLP Individual Minutes  Time In: 1030  Time Out: 1100  Minutes: 30  Timed Code Treatment Minutes: 0 Minutes       Date: 2025  Patient Name: Lulu Werner      CSN: [id number]   : 1935  (89 y.o.)  Gender: female   Referring Physician:  Pushpa Redd DO   Diagnosis: Acute cerebrovascular accident (CVA) (HCC)  Precautions: Fall risk   Current Diet: Regular diet, thin liquids   Respiratory Status: Room Air  Date of Last MBS/FEES: Not Applicable    Pain:  Headache- no rate given - RN notified     Subjective:  Patient sitting upright in recliner upon ST arrival. Alert and cooperative.     Short-Term Goals:  SHORT TERM GOAL #1:  Goal 1: Patient will consume regular solids/thin liquids with stable pulmonary status and incorporation of trained compensatory strategies to assist with nutrition/hydration measures  INTERVENTIONS:DNT due to focus on other STGs.     SHORT TERM GOAL #2:  Goal 2: Considerations for an instrumental evaluation should adverse changes be conveyed in direct relation to the safety/efficency of the swallow mechanism.  INTERVENTIONS: Not clinically indicated at this time.     SHORT TERM GOAL #3:  Goal 3: Patient will complete verbal expression tasks (including BASIC naming, automatic speech tasks, biographics, picture description, verbal fluency, word/phrase level repetition) with 60% accuracy, max cues to improve functional expressive communication.   INTERVENTIONS: DNT due to focus of other STGs.     SHORT TERM GOAL #4:  Goal 4: Patient will complete auditory/reading comprehension (including direction following of 1-2 step commands, BASIC yes/questions, functional reading comprehension, Object ID froma  FO3) with 70% accuracy, mod cues to improve functional recepetive communication.  INTERVENTIONS:   Flowers Program: - Direction Following  4/18 independent, 6/18 min cues, 2/18 mod cues, 6/18 total assist/Belkofski completion  *SIGNIFICANTLY prolonged time completion within task to allow for prolonged processing speed. Patient requiring cues to breakdown instructions.       SHORT TERM GOAL #5:  Goal 5: Patient will complete written expressive tasks (ie: biographics, common words/phrases) with 50% accuracy, max cues to improve written communication.  INTERVENTIONS: DNT due to focus on other STGs.     SHORT TERM GOAL #6:  Goal 6: Patient will complete functional carryover tasks (o-log, biographics, call light) with 50% accuracy with max cues to improve awareness/participation in current environment and complete formal cognitive screener as clinically indicated  INTERVENTIONS:  Orientation:  Hospital: [facility]  Month: total assist  Year: total assist     Long-Term Goals:  Time Frame for Long Term Goals: 3 weeks    LONG TERM GOAL #1:  Goal 1: Patient will consume regular solids/thin liquids with stable pulmonary status and incorporation of trained compensatory strategies to assist with nutrition/hydration measures    LONG TERM GOAL #2:  Goal 2: Patient will improve receptive/expressive language skills to a level of min assist to improve every day communication    Functional Oral Intake Scale: Total Oral Intake: 7.  Total oral intake with no restrictions    EDUCATION:  Learner: Patient  Education:  Education Related to Stroke Diagnosis, Reviewed ST goals and Plan of Care, Reviewed recommendations for follow-up, and Education Related to Potential Risks and Complications Due to Impairment/Illness/Injury  Evaluation of Education: Verbalizes understanding, Needs further instruction, and Family not present    ASSESSMENT/PLAN:  Activity Tolerance:  Patient tolerance of  treatment: good.      Assessment/Plan: Patient progressing toward

## 2025-06-06 NOTE — DISCHARGE INSTRUCTIONS
NO DRIVING UNTIL OUTPATIENT DRIVING EVALUATION COMPLETED. OUTPATIENT DRIVING EVALUATION AVAILABLE THROUGH [facility] ([address], [address], [place], [place] [address]. PH: [phone]).     PLEASE CALLED [facility] OFFICE ([phone]) ON MONDAY 6/16/2025 TO SCHEDULE A FOLLOW UP WITH PRIMARY CARE PROVIDER     Stroke: Care Instructions  Overview     A stroke is damage to the brain that occurs when a blood vessel in the brain bursts or is blocked by a blood clot. Without blood and the oxygen it carries, part of the brain is damaged. The part of your body controlled by that part of your brain may not function properly now.  The brain is an amazing organ that can heal itself to some degree. The stroke you had damaged part of your brain. But other parts of your brain may take over in some way for the damaged areas.  Your doctor will talk with you about what you can do to prevent another stroke. You can help by managing other health problems that raise your risk, such as atrial fibrillation or high blood pressure. Have a heart-healthy lifestyle which includes being active, eating healthy foods, staying at a healthy weight, and not smoking. You may also take medicine that prevents blood clots.  Enter a stroke rehabilitation (rehab) program if your doctor recommends it. Stroke rehab is training and therapy to help you recover, prevent problems, and relearn how to do everyday things you have not been able to do since your stroke. The focus will depend on how the stroke has affected your ability to do the things you want and need to do.  Follow-up care is a key part of your treatment and safety. Be sure to make and go to all appointments, and call your doctor if you are having problems. It's also a good idea to know your test results and keep a list of the medicines you take.  How can you care for yourself at home?    Attend stroke rehabilitation (rehab) if your doctor recommends it. Your rehab

## 2025-06-06 NOTE — PROGRESS NOTES
[facility]  [facility]-STR[facility] 8K IP REHAB  Occupational Therapy  Daily Note    Discharge Recommendations: Home with Outpatient OT and Patient would benefit from continued OT at discharge  Equipment Recommendations:   Continue to address/confirm what equipment pt owns as appropriate/able.       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  MRI indicated interval deteiroation since previous study with new areas of restricted diffusion in L subinsular cortex, L parietal subcortical white matter and R frontal subcortical white mater.  It is noted repeat MRI indicated L hemispheric CVAs secondary to the L carotid occlusion.  Hospital course complicated by a-fib RVR, UTI, edema in RUE as superficial thrombophlebitis present. See Dr. Redd H&P for additional informtion. Pt admitted to the IPR unit on 6/3/25 for further medical care and referred to skilled OT services at this time.    Restrictions/Precautions:  Restrictions/Precautions: Fall Risk, General Precautions      Social/Functional History:  Lives With: Alone  Type of Home: House  Home Layout: Two level, Performs ADL's on one level, Able to Live on Main level with bedroom/bathroom  Home Access: Stairs to enter with rails  Entrance Stairs - Number of Steps: 3 CATHERINE at front and garage entrance  Entrance Stairs - Rails: Right  Home Equipment: Cane, Walker - Rolling   Bathroom Shower/Tub: Walk-in shower  Bathroom Toilet: Standard  Bathroom Equipment: None  Bathroom Accessibility: Accessible       Prior

## 2025-06-07 PROCEDURE — 92507 TX SP LANG VOICE COMM INDIV: CPT

## 2025-06-07 PROCEDURE — 6370000000 HC RX 637 (ALT 250 FOR IP): Performed by: STUDENT IN AN ORGANIZED HEALTH CARE EDUCATION/TRAINING PROGRAM

## 2025-06-07 PROCEDURE — 97530 THERAPEUTIC ACTIVITIES: CPT

## 2025-06-07 PROCEDURE — 97112 NEUROMUSCULAR REEDUCATION: CPT

## 2025-06-07 PROCEDURE — 92526 ORAL FUNCTION THERAPY: CPT

## 2025-06-07 PROCEDURE — 1180000000 HC REHAB R&B

## 2025-06-07 PROCEDURE — 6370000000 HC RX 637 (ALT 250 FOR IP): Performed by: FAMILY MEDICINE

## 2025-06-07 PROCEDURE — 97535 SELF CARE MNGMENT TRAINING: CPT

## 2025-06-07 PROCEDURE — 97110 THERAPEUTIC EXERCISES: CPT

## 2025-06-07 PROCEDURE — 97116 GAIT TRAINING THERAPY: CPT

## 2025-06-07 PROCEDURE — 6360000002 HC RX W HCPCS: Performed by: STUDENT IN AN ORGANIZED HEALTH CARE EDUCATION/TRAINING PROGRAM

## 2025-06-07 RX ADMIN — TRIAMCINOLONE ACETONIDE: 1 CREAM TOPICAL at 08:18

## 2025-06-07 RX ADMIN — Medication 1 TABLET: at 08:19

## 2025-06-07 RX ADMIN — ATORVASTATIN CALCIUM 80 MG: 80 TABLET, FILM COATED ORAL at 20:49

## 2025-06-07 RX ADMIN — ASPIRIN 325 MG: 325 TABLET, COATED ORAL at 08:19

## 2025-06-07 RX ADMIN — ENOXAPARIN SODIUM 30 MG: 100 INJECTION SUBCUTANEOUS at 08:18

## 2025-06-07 RX ADMIN — FAMOTIDINE 20 MG: 20 TABLET, FILM COATED ORAL at 08:19

## 2025-06-07 RX ADMIN — METOPROLOL 100 MG: 100 TABLET ORAL at 08:19

## 2025-06-07 RX ADMIN — Medication 1000 MG: at 08:19

## 2025-06-07 RX ADMIN — METOPROLOL 100 MG: 100 TABLET ORAL at 20:49

## 2025-06-07 RX ADMIN — POTASSIUM CHLORIDE 10 MEQ: 1500 TABLET, EXTENDED RELEASE ORAL at 08:18

## 2025-06-07 RX ADMIN — CLOPIDOGREL BISULFATE 75 MG: 75 TABLET, FILM COATED ORAL at 08:19

## 2025-06-07 RX ADMIN — LATANOPROST 1 DROP: 50 SOLUTION OPHTHALMIC at 20:50

## 2025-06-07 RX ADMIN — DILTIAZEM HYDROCHLORIDE 120 MG: 120 CAPSULE, EXTENDED RELEASE ORAL at 08:19

## 2025-06-07 NOTE — PROGRESS NOTES
[facility]  MH-STRZ 8K IP REHAB  Occupational Therapy  Daily Note    Discharge Recommendations: Patient would benefit from continued OT at discharge  Equipment Recommendations:   Continue to address/confirm what equipment pt owns as appropriate/able.      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  MRI indicated interval deteiroation since previous study with new areas of restricted diffusion in L subinsular cortex, L parietal subcortical white matter and R frontal subcortical white mater.  It is noted repeat MRI indicated L hemispheric CVAs secondary to the L carotid occlusion.  Hospital course complicated by a-fib RVR, UTI, edema in RUE as superficial thrombophlebitis present. See Dr. Redd H&P for additional informtion. Pt admitted to the IPR unit on 6/3/25 for further medical care and referred to skilled OT services at this time.    Restrictions/Precautions:  Restrictions/Precautions: Fall Risk, General Precautions      Social/Functional History:  Lives With: Alone  Type of Home: House  Home Layout: Two level, Performs ADL's on one level, Able to Live on Main level with bedroom/bathroom  Home Access: Stairs to enter with rails  Entrance Stairs - Number of Steps: 3 CATHERINE at front and garage entrance  Entrance Stairs - Rails: Right  Home Equipment: Cane, Walker - Rolling   Bathroom Shower/Tub: Walk-in shower  Bathroom Toilet: Standard  Bathroom Equipment: None  Bathroom Accessibility: Accessible       Prior Level of Assist for ADLs:  Stair training, Coordination training    Goals  Short Term Goals  Time Frame for Short Term Goals: 1 week  Short Term Goal 1: Pt. to tolerate 5 minutes of standing with Supervision with 1-2 hand release to improve activity tolerance for ADL completion.  Short Term Goal 2: Pt. to retrieve/transport at least 2 items with Supervision to maximize performance with self care prep.  Short Term Goal 3: Pt to open/close 4/5 containers with supervision to maximize performance with grooming routines.  Short Term Goal 4: Pt to sequence through simple meal prep task with 3 or more instructions with SBA min cues to improve engagement in IADLs.  Long Term Goals  Time Frame for Long Term Goals : 2 weeks from OT annette on the IPR unit  Long Term Goal 1: Pt. to demo improved occupational performance as evidenced by a score of 80/100 on the Modified Barthel Index.  Long Term Goal 2: Pt. to complete simple IADL routines with Supervision to progress to PLOF.  Long Term Goal 3: Pt to demo Mod I to transfer to/from toilet to enhance performance with ADL transfers.    Following session, patient left in safe position in chair, with alarm, and call light within reach

## 2025-06-07 NOTE — CARE COORDINATION
Up to the bathroom with one assist and the walker. Was confused with the H.S. medication pass as patient did not remember getting her medications but were given by this nurse. I did explain that she took her pills and that they are charted in the chart and everything is fine but she kept insisting that she never received them. No pain or discomfort noted. Has been continent this shift. The Telesitter remains in the room as patient is confused @ times and can be impulsive. Will continue to monitor.

## 2025-06-07 NOTE — PLAN OF CARE
Problem: Chronic Conditions and Co-morbidities  Goal: Patient's chronic conditions and co-morbidity symptoms are monitored and maintained or improved  6/7/2025 1053 by Rolan Pham LPN  Outcome: Progressing  Flowsheets (Taken 6/7/2025 0800)  Care Plan - Patient's Chronic Conditions and Co-Morbidity Symptoms are Monitored and Maintained or Improved: Monitor and assess patient's chronic conditions and comorbid symptoms for stability, deterioration, or improvement     Problem: Discharge Planning  Goal: Discharge to home or other facility with appropriate resources  6/7/2025 1053 by Rolan Pham LPN  Outcome: Progressing  Flowsheets (Taken 6/7/2025 0800)  Discharge to home or other facility with appropriate resources: Identify barriers to discharge with patient and caregiver     Problem: Skin/Tissue Integrity  Goal: Skin integrity remains intact  Description: 1.  Monitor for areas of redness and/or skin breakdown2.  Assess vascular access sites hourly3.  Every 4-6 hours minimum:  Change oxygen saturation probe site4.  Every 4-6 hours:  If on nasal continuous positive airway pressure, respiratory therapy assess nares and determine need for appliance change or resting period  6/7/2025 1053 by Rolan Pham LPN  Outcome: Progressing     Problem: Safety - Adult  Goal: Free from fall injury  6/7/2025 1053 by Rolan Pham LPN  Outcome: Progressing     Problem: ABCDS Injury Assessment  Goal: Absence of physical injury  6/7/2025 1053 by Rolan Pham LPN  Outcome: Progressing     Problem: Pain  Goal: Verbalizes/displays adequate comfort level or baseline comfort level  6/7/2025 1053 by Rolan Pham LPN  Outcome: Progressing  Flowsheets (Taken 6/7/2025 0800)  Verbalizes/displays adequate comfort level or baseline comfort level:   Encourage patient to monitor pain and request assistance   Assess pain using appropriate pain scale     Problem: Nutrition Deficit:  Goal: Optimize nutritional status  6/7/2025 1053 by  Rolan Pham LPN  Outcome: Progressing

## 2025-06-07 NOTE — PROGRESS NOTES
[facility]  INPATIENT SPEECH THERAPY  [facility] 8K IP REHAB  DAILY NOTE    Discharge Recommendations: Outpatient Speech Therapy  DIET ORDER RECOMMENDATIONS: Regular diet, thin liquids  STRATEGIES: Full Upright Position, Small Bite/Sip, and Alternate Solids and Liquids     SLP Individual Minutes  Time In: 1100  Time Out: 1130  Minutes: 30  Timed Code Treatment Minutes: 0 Minutes     Dysphagia Tx: 0 minutes  Speech-Language Tx: 30 minutes     Date: 2025  Patient Name: Lulu Werner      CSN: [id number]   : 1935  (89 y.o.)  Gender: female   Referring Physician:  Pushpa Redd DO   Diagnosis: Acute cerebrovascular accident (CVA) ([facility])  Precautions: Fall risk   Current Diet: Regular diet, thin liquids   Respiratory Status: Room Air  Date of Last MBS/FEES: Not Applicable    Pain:  None reported     Subjective:  Patient resting in chair at ST arrival. Alert and cooperative throughout. No family present.     Short-Term Goals:  SHORT TERM GOAL #1:  Goal 1: Patient will consume regular solids/thin liquids with stable pulmonary status and incorporation of trained compensatory strategies to assist with nutrition/hydration measures  INTERVENTIONS:  Not directly addressed due to focus on other goals.      WBC   Date Value Ref Range Status   2025 5.1 4.8 - 10.8 thou/mm3 Final       Right Upper Lobe: Clear  Right Middle Lobe: Clear  Right Lower Lobe: Diminished  Left Upper Lobe: Clear  Left Lower Lobe: Diminished    SHORT TERM GOAL #2:  Goal 2: Considerations for an instrumental evaluation should adverse changes be conveyed in direct relation to the safety/efficency of the swallow mechanism.  INTERVENTIONS: Not clinically indicated at this time.     SHORT TERM GOAL #3:  Goal 3: Patient will complete verbal expression tasks (including BASIC naming, automatic speech tasks, biographics, picture description, verbal fluency, word/phrase level repetition) with 60% accuracy, max cues to improve functional

## 2025-06-07 NOTE — PLAN OF CARE
Problem: Chronic Conditions and Co-morbidities  Goal: Patient's chronic conditions and co-morbidity symptoms are monitored and maintained or improved  Outcome: Progressing  Flowsheets (Taken 6/6/2025 2004)  Care Plan - Patient's Chronic Conditions and Co-Morbidity Symptoms are Monitored and Maintained or Improved: Monitor and assess patient's chronic conditions and comorbid symptoms for stability, deterioration, or improvement     Problem: Discharge Planning  Goal: Discharge to home or other facility with appropriate resources  Outcome: Progressing  Flowsheets (Taken 6/6/2025 2004)  Discharge to home or other facility with appropriate resources: Identify barriers to discharge with patient and caregiver     Problem: Skin/Tissue Integrity  Goal: Skin integrity remains intact  Description: 1.  Monitor for areas of redness and/or skin breakdown2.  Assess vascular access sites hourly3.  Every 4-6 hours minimum:  Change oxygen saturation probe site4.  Every 4-6 hours:  If on nasal continuous positive airway pressure, respiratory therapy assess nares and determine need for appliance change or resting period  Outcome: Progressing  Flowsheets  Taken 6/7/2025 0118  Skin Integrity Remains Intact: Monitor for areas of redness and/or skin breakdown    Problem: Safety - Adult  Goal: Free from fall injury  Outcome: Progressing     Problem: ABCDS Injury Assessment  Goal: Absence of physical injury  Outcome: Progressing     Problem: Pain  Goal: Verbalizes/displays adequate comfort level or baseline comfort level  Outcome: Progressing  Flowsheets (Taken 6/6/2025 2015)  Verbalizes/displays adequate comfort level or baseline comfort level: Encourage patient to monitor pain and request assistance

## 2025-06-07 NOTE — PROGRESS NOTES
Patient: Lulu Werner  Unit/Bed: 8K-05/005-A  YOB: 1935  MRN: [id number] Acct: [id number]   Admitting Diagnosis: Stroke (cerebrum) (HCC) [I63.9]  Acute cerebrovascular accident (CVA) (HCC) [I63.9]  Admit Date:  6/3/2025  Hospital Day: 4    Assessment:     Principal Problem:    Acute cerebrovascular accident (CVA) (HCC)  Resolved Problems:    * No resolved hospital problems. *      Plan:     Contnue to follow        Subjective:     Patient has no complaint of CP or SOB..   Medication side effects: none    Scheduled Meds:   metoprolol tartrate  100 mg Oral BID    dilTIAZem  120 mg Oral Daily    famotidine  20 mg Oral Daily    multivitamin  1 tablet Oral Daily    fish oil  1 capsule Oral Daily    potassium chloride  10 mEq Oral Daily    triamcinolone   Topical BID    latanoprost  1 drop Both Eyes Nightly    sodium chloride flush  5-40 mL IntraVENous 2 times per day    atorvastatin  80 mg Oral Nightly    clopidogrel  75 mg Oral Daily    aspirin  325 mg Oral Daily    enoxaparin  30 mg SubCUTAneous Daily    senna  1 tablet Oral Nightly    docusate sodium  100 mg Oral Daily    polyethylene glycol  17 g Oral Daily    [START ON 6/21/2025] aspirin  81 mg Oral Daily     Continuous Infusions:   sodium chloride       PRN Meds:sodium chloride flush, sodium chloride, melatonin, ondansetron, acetaminophen, magnesium hydroxide, docusate sodium    Review of Systems  Pertinent items are noted in HPI.    Objective:     Patient Vitals for the past 8 hrs:   BP Temp Temp src Pulse Resp SpO2 Weight   06/07/25 0800 (!) 157/99 97.5 °F (36.4 °C) Oral 82 16 98 % --   06/07/25 0500 -- -- -- -- -- -- 58.6 kg (129 lb 3 oz)     I/O last 3 completed shifts:  In: 950 [P.O.:950]  Out: -   No intake/output data recorded.    BP (!) 157/99   Pulse 82   Temp 97.5 °F (36.4 °C) (Oral)   Resp 16   Ht 1.524 m (5')   Wt 58.6 kg (129 lb 3 oz)   SpO2 98%   BMI 25.23 kg/m²     General appearance: alert, appears stated age, and  cooperative  Head: Normocephalic, without obvious abnormality, atraumatic  Lungs: clear to auscultation bilaterally  Heart: regular rate and rhythm, S1, S2 normal, no murmur, click, rub or gallop  Abdomen: soft, non-tender; bowel sounds normal; no masses,  no organomegaly  Extremities: extremities normal, atraumatic, no cyanosis or edema  Skin: Skin color, texture, turgor normal. No rashes or lesions  Neurologic:  weak    Electronically signed by Rene Rouse MD on 6/7/2025 at 12:03 PM

## 2025-06-07 NOTE — PROGRESS NOTES
[facility]  INPATIENT SPEECH THERAPY  [facility] 8K IP REHAB  DAILY NOTE    Discharge Recommendations:  Continue to assess pending progress  DIET ORDER RECOMMENDATIONS: Regular diet, thin liquids  STRATEGIES: Full Upright Position, Small Bite/Sip, and Alternate Solids and Liquids     SLP Individual Minutes  Time In: 0830  Time Out: 0900  Minutes: 30  Timed Code Treatment Minutes: 0 Minutes     Dysphagia Tx: 15 minutes  Speech-Language Tx: 15 minutes     Date: 2025  Patient Name: Lulu Werner      CSN: [id number]   : 1935  (89 y.o.)  Gender: female   Referring Physician:  Pushpa Redd DO   Diagnosis: Acute cerebrovascular accident (CVA) (HCC)  Precautions: Fall risk   Current Diet: Regular diet, thin liquids   Respiratory Status: Room Air  Date of Last MBS/FEES: Not Applicable    Pain:  None reported     Subjective:  Patient resting in bed at ST arrival. Breakfast tray arrived, and ST assisted re-positioning in recliner chair for consumption. Alert and cooperative throughout. No family present.     Short-Term Goals:  SHORT TERM GOAL #1:  Goal 1: Patient will consume regular solids/thin liquids with stable pulmonary status and incorporation of trained compensatory strategies to assist with nutrition/hydration measures  INTERVENTIONS:  Skilled dietary analysis completed during breakfast meal to include intake of applesauce, oatmeal, and thin liquids via cup. Independent self-feeding after ST with set-up assist. Overall WFL oral functioning characterized by appropriate textural breakdown/mastication, bolus formation and manipulation, and oral clearance; No oral residuals visualized. Across all intake, patient with no demonstration of overt s/s of airway invasion (I.e., cough, throat clear, wet vocal quality). However, certainly unable to rule out pharyngeal dysfunction in the absence of direct imaging. Patient also denies globus sensation and odynophagia and reports minimal swallow dysfunction.  Patient will complete auditory/reading comprehension (including direction following of 1-2 step commands, BASIC yes/questions, functional reading comprehension, Object ID froma FO3) with 70% accuracy, mod cues to improve functional recepetive communication.  INTERVENTIONS: Not directly addressed due to focus on other goals.    SHORT TERM GOAL #5:  Goal 5: Patient will complete written expressive tasks (ie: biographics, common words/phrases) with 50% accuracy, max cues to improve written communication.  INTERVENTIONS: Not directly addressed due to focus on other goals.    SHORT TERM GOAL #6:  Goal 6: Patient will complete functional carryover tasks (o-log, biographics, call light) with 50% accuracy with max cues to improve awareness/participation in current environment and complete formal cognitive screener as clinically indicated  INTERVENTIONS: Not directly addressed due to focus on other goals.      Long-Term Goals:  Time Frame for Long Term Goals: 3 weeks    LONG TERM GOAL #1:  Goal 1: Patient will consume regular solids/thin liquids with stable pulmonary status and incorporation of trained compensatory strategies to assist with nutrition/hydration measures    LONG TERM GOAL #2:  Goal 2: Patient will improve receptive/expressive language skills to a level of min assist to improve every day communication    Functional Oral Intake Scale: Total Oral Intake: 7.  Total oral intake with no restrictions    EDUCATION:  Learner: Patient  Education:  Education Related to Stroke Diagnosis, Reviewed ST goals and Plan of Care, Reviewed recommendations for follow-up, and Education Related to Potential Risks and Complications Due to Impairment/Illness/Injury  Evaluation of Education: Verbalizes understanding, Needs further instruction, and Family not present    ASSESSMENT/PLAN:  Activity Tolerance:  Patient tolerance of treatment: good.      Assessment/Plan: Patient progressing toward established goals.  Continues to require

## 2025-06-08 PROCEDURE — 1180000000 HC REHAB R&B

## 2025-06-08 PROCEDURE — 6370000000 HC RX 637 (ALT 250 FOR IP): Performed by: STUDENT IN AN ORGANIZED HEALTH CARE EDUCATION/TRAINING PROGRAM

## 2025-06-08 PROCEDURE — 6370000000 HC RX 637 (ALT 250 FOR IP): Performed by: FAMILY MEDICINE

## 2025-06-08 PROCEDURE — 6360000002 HC RX W HCPCS: Performed by: STUDENT IN AN ORGANIZED HEALTH CARE EDUCATION/TRAINING PROGRAM

## 2025-06-08 RX ADMIN — Medication 1000 MG: at 08:21

## 2025-06-08 RX ADMIN — POTASSIUM CHLORIDE 10 MEQ: 1500 TABLET, EXTENDED RELEASE ORAL at 08:19

## 2025-06-08 RX ADMIN — TRIAMCINOLONE ACETONIDE: 1 CREAM TOPICAL at 21:47

## 2025-06-08 RX ADMIN — DOCUSATE SODIUM 100 MG: 100 CAPSULE, LIQUID FILLED ORAL at 08:19

## 2025-06-08 RX ADMIN — LATANOPROST 1 DROP: 50 SOLUTION OPHTHALMIC at 21:57

## 2025-06-08 RX ADMIN — CLOPIDOGREL BISULFATE 75 MG: 75 TABLET, FILM COATED ORAL at 08:19

## 2025-06-08 RX ADMIN — METOPROLOL 100 MG: 100 TABLET ORAL at 08:58

## 2025-06-08 RX ADMIN — ASPIRIN 325 MG: 325 TABLET, COATED ORAL at 08:19

## 2025-06-08 RX ADMIN — ATORVASTATIN CALCIUM 80 MG: 80 TABLET, FILM COATED ORAL at 21:57

## 2025-06-08 RX ADMIN — ENOXAPARIN SODIUM 30 MG: 100 INJECTION SUBCUTANEOUS at 08:19

## 2025-06-08 RX ADMIN — DILTIAZEM HYDROCHLORIDE 120 MG: 120 CAPSULE, EXTENDED RELEASE ORAL at 08:20

## 2025-06-08 RX ADMIN — Medication 1 TABLET: at 08:21

## 2025-06-08 RX ADMIN — METOPROLOL 100 MG: 100 TABLET ORAL at 21:57

## 2025-06-08 RX ADMIN — FAMOTIDINE 20 MG: 20 TABLET, FILM COATED ORAL at 08:19

## 2025-06-08 RX ADMIN — TRIAMCINOLONE ACETONIDE: 1 CREAM TOPICAL at 08:20

## 2025-06-08 ASSESSMENT — PAIN SCALES - GENERAL: PAINLEVEL_OUTOF10: 0

## 2025-06-08 NOTE — PLAN OF CARE
Problem: Chronic Conditions and Co-morbidities  Goal: Patient's chronic conditions and co-morbidity symptoms are monitored and maintained or improved  6/8/2025 1118 by Rolan Pham LPN  Outcome: Progressing  Flowsheets (Taken 6/8/2025 0815)  Care Plan - Patient's Chronic Conditions and Co-Morbidity Symptoms are Monitored and Maintained or Improved: Monitor and assess patient's chronic conditions and comorbid symptoms for stability, deterioration, or improvement     Problem: Discharge Planning  Goal: Discharge to home or other facility with appropriate resources  6/8/2025 1118 by Rolan Pham LPN  Outcome: Progressing  Flowsheets (Taken 6/8/2025 0815)  Discharge to home or other facility with appropriate resources: Identify barriers to discharge with patient and caregiver     Problem: Skin/Tissue Integrity  Goal: Skin integrity remains intact  Description: 1.  Monitor for areas of redness and/or skin breakdown2.  Assess vascular access sites hourly3.  Every 4-6 hours minimum:  Change oxygen saturation probe site4.  Every 4-6 hours:  If on nasal continuous positive airway pressure, respiratory therapy assess nares and determine need for appliance change or resting period  6/8/2025 1118 by Rolan Pham LPN  Outcome: Progressing     Problem: Safety - Adult  Goal: Free from fall injury  6/8/2025 1118 by Rolan Pham LPN  Outcome: Progressing     Problem: ABCDS Injury Assessment  Goal: Absence of physical injury  6/8/2025 1118 by Rolan Pham LPN  Outcome: Progressing

## 2025-06-08 NOTE — CARE COORDINATION
Up with one assist to the BR and back to bed. Denies pain when asked. Has remained continent this shift. Took medications well tonight. Telesitter remains in place for intermittent confusion. Bed Alarm activated this shift.

## 2025-06-08 NOTE — PLAN OF CARE
Problem: Chronic Conditions and Co-morbidities  Goal: Patient's chronic conditions and co-morbidity symptoms are monitored and maintained or improved  6/8/2025 0048 by Shilpa Zheng RN  Outcome: Progressing  Flowsheets (Taken 6/7/2025 2047)  Care Plan - Patient's Chronic Conditions and Co-Morbidity Symptoms are Monitored and Maintained or Improved: Monitor and assess patient's chronic conditions and comorbid symptoms for stability, deterioration, or improvement     Problem: Discharge Planning  Goal: Discharge to home or other facility with appropriate resources  6/8/2025 0048 by Shilpa Zheng RN  Outcome: Progressing  Flowsheets (Taken 6/7/2025 2047)  Discharge to home or other facility with appropriate resources: Identify barriers to discharge with patient and caregiver     Problem: Skin/Tissue Integrity  Goal: Skin integrity remains intact  Description: 1.  Monitor for areas of redness and/or skin breakdown2.  Assess vascular access sites hourly3.  Every 4-6 hours minimum:  Change oxygen saturation probe site4.  Every 4-6 hours:  If on nasal continuous positive airway pressure, respiratory therapy assess nares and determine need for appliance change or resting period  6/8/2025 0048 by Shilpa Zheng, RN  Outcome: Progressing  Flowsheets (Taken 6/7/2025 2047)  Skin Integrity Remains Intact: Monitor for areas of redness and/or skin breakdown     Problem: Safety - Adult  Goal: Free from fall injury  6/8/2025 0048 by Shilpa Zheng RN  Outcome: Progressing     Problem: ABCDS Injury Assessment  Goal: Absence of physical injury  6/8/2025 0048 by Shilpa Zheng RN  Outcome: Progressing     Problem: Pain  Goal: Verbalizes/displays adequate comfort level or baseline comfort level  6/8/2025 0048 by Shilpa Zheng RN  Outcome: Progressing  Flowsheets (Taken 6/7/2025 2047)  Verbalizes/displays adequate comfort level or baseline comfort level: Encourage patient to monitor pain and request assistance      done/left ankle

## 2025-06-08 NOTE — PLAN OF CARE
Problem: Chronic Conditions and Co-morbidities  Goal: Patient's chronic conditions and co-morbidity symptoms are monitored and maintained or improved  6/8/2025 0107 by Shilpa Zheng RN  Outcome: Progressing     Problem: Discharge Planning  Goal: Discharge to home or other facility with appropriate resources  6/8/2025 0107 by Shilpa Zheng, RN  Outcome: Progressing     Problem: Skin/Tissue Integrity  Goal: Skin integrity remains intact  Description: 1.  Monitor for areas of redness and/or skin breakdown2.  Assess vascular access sites hourly3.  Every 4-6 hours minimum:  Change oxygen saturation probe site4.  Every 4-6 hours:  If on nasal continuous positive airway pressure, respiratory therapy assess nares and determine need for appliance change or resting period  6/8/2025 0107 by Shilpa Zheng, RN  Outcome: Progressing     Problem: Safety - Adult  Goal: Free from fall injury  6/8/2025 0107 by Shilpa Zheng, RN  Outcome: Progressing     Problem: ABCDS Injury Assessment  Goal: Absence of physical injury  6/8/2025 0107 by Shilpa Zheng RN  Outcome: Progressing     Problem: Pain  Goal: Verbalizes/displays adequate comfort level or baseline comfort level  6/8/2025 0107 by Shilpa Zheng, RN  Outcome: Progressing

## 2025-06-09 LAB
ANION GAP SERPL CALC-SCNC: 11 MEQ/L (ref 8–16)
BUN SERPL-MCNC: 24 MG/DL (ref 8–23)
CALCIUM SERPL-MCNC: 9.5 MG/DL (ref 8.8–10.2)
CHLORIDE SERPL-SCNC: 107 MEQ/L (ref 98–111)
CO2 SERPL-SCNC: 21 MEQ/L (ref 22–29)
CREAT SERPL-MCNC: 1.1 MG/DL (ref 0.5–0.9)
GFR SERPL CREATININE-BSD FRML MDRD: 48 ML/MIN/1.73M2
GLUCOSE SERPL-MCNC: 109 MG/DL (ref 74–109)
POTASSIUM SERPL-SCNC: 4.1 MEQ/L (ref 3.5–5.2)
SODIUM SERPL-SCNC: 139 MEQ/L (ref 135–145)

## 2025-06-09 PROCEDURE — 1180000000 HC REHAB R&B

## 2025-06-09 PROCEDURE — 97112 NEUROMUSCULAR REEDUCATION: CPT

## 2025-06-09 PROCEDURE — 36415 COLL VENOUS BLD VENIPUNCTURE: CPT

## 2025-06-09 PROCEDURE — 92507 TX SP LANG VOICE COMM INDIV: CPT

## 2025-06-09 PROCEDURE — 99232 SBSQ HOSP IP/OBS MODERATE 35: CPT | Performed by: NURSE PRACTITIONER

## 2025-06-09 PROCEDURE — 97116 GAIT TRAINING THERAPY: CPT

## 2025-06-09 PROCEDURE — 97535 SELF CARE MNGMENT TRAINING: CPT

## 2025-06-09 PROCEDURE — 97530 THERAPEUTIC ACTIVITIES: CPT

## 2025-06-09 PROCEDURE — 6370000000 HC RX 637 (ALT 250 FOR IP): Performed by: FAMILY MEDICINE

## 2025-06-09 PROCEDURE — 6370000000 HC RX 637 (ALT 250 FOR IP): Performed by: STUDENT IN AN ORGANIZED HEALTH CARE EDUCATION/TRAINING PROGRAM

## 2025-06-09 PROCEDURE — 80048 BASIC METABOLIC PNL TOTAL CA: CPT

## 2025-06-09 RX ADMIN — SENNOSIDES 8.6 MG: 8.6 TABLET, FILM COATED ORAL at 19:55

## 2025-06-09 RX ADMIN — Medication 1 TABLET: at 08:59

## 2025-06-09 RX ADMIN — POLYETHYLENE GLYCOL 3350 17 G: 17 POWDER, FOR SOLUTION ORAL at 08:56

## 2025-06-09 RX ADMIN — METOPROLOL 100 MG: 100 TABLET ORAL at 19:55

## 2025-06-09 RX ADMIN — CLOPIDOGREL BISULFATE 75 MG: 75 TABLET, FILM COATED ORAL at 08:56

## 2025-06-09 RX ADMIN — TRIAMCINOLONE ACETONIDE: 1 CREAM TOPICAL at 19:55

## 2025-06-09 RX ADMIN — TRIAMCINOLONE ACETONIDE: 1 CREAM TOPICAL at 09:00

## 2025-06-09 RX ADMIN — FAMOTIDINE 20 MG: 20 TABLET, FILM COATED ORAL at 08:56

## 2025-06-09 RX ADMIN — DILTIAZEM HYDROCHLORIDE 120 MG: 120 CAPSULE, EXTENDED RELEASE ORAL at 08:58

## 2025-06-09 RX ADMIN — ASPIRIN 325 MG: 325 TABLET, COATED ORAL at 08:56

## 2025-06-09 RX ADMIN — METOPROLOL 100 MG: 100 TABLET ORAL at 08:58

## 2025-06-09 RX ADMIN — Medication 1000 MG: at 08:59

## 2025-06-09 RX ADMIN — POTASSIUM CHLORIDE 10 MEQ: 1500 TABLET, EXTENDED RELEASE ORAL at 08:56

## 2025-06-09 RX ADMIN — DOCUSATE SODIUM 100 MG: 100 CAPSULE, LIQUID FILLED ORAL at 08:56

## 2025-06-09 RX ADMIN — LATANOPROST 1 DROP: 50 SOLUTION OPHTHALMIC at 19:55

## 2025-06-09 RX ADMIN — ATORVASTATIN CALCIUM 80 MG: 80 TABLET, FILM COATED ORAL at 19:55

## 2025-06-09 ASSESSMENT — PAIN SCALES - GENERAL: PAINLEVEL_OUTOF10: 0

## 2025-06-09 NOTE — PROGRESS NOTES
ambulation. During ambulation, pt demonstrates gait deviations such as decreased gait speed, aga, arm swing, and increased forward flexed posture. During session, pt verbalizes preference for demonstration compared to verbal instructions for exercise / activities. Pt would continue to benefit from skilled PT per POC.  Activity Tolerance:  Patient tolerance of  treatment:Good.  Plan: Current Treatment Recommendations: Strengthening, Balance training, Functional mobility training, Transfer training, Endurance training, Gait training, Stair training, Neuromuscular re-education, Home exercise program, Safety education & training, Patient/Caregiver education & training, Equipment evaluation, education, & procurement, Therapeutic activities  General Plan:  (60min/day x 5 days)    Education:  Learners: Patient  Patient Education: Plan of Care, Gait, Verbal Exercise Instruction,  - Patient Verbalized Understanding    Goals:  Patient Goals : Pt does not state goal for PT  Short Term Goals  Time Frame for Short Term Goals: 1 week from eval  Short Term Goal 1: Pt will perform supine<> sit transfer without use of bedrails and no more than Mod I in order to get into/out of bed at home  Short Term Goal 2: Pt will perform sit<>stand transfer with use of RW and no more than supervision to be able to transfer to/from various surfaces  Short Term Goal 3: Pt will be able to perform transfer in<>out of car with use of RW and no more than supervision in order to meet transportation needs  Short Term Goal 4: Pt will be able to ambulate 35ft with use of RW and no more than supervision in order to ambulate household distances safely  Short Term Goal 5: Pt will be able to ascend/descend 3 stairs with use of amadeo handrail and no more than supervision in order to enter/exit the home safely  Long Term Goals  Time Frame for Long Term Goals : 3 weeks  Long Term Goal 1: Pt will perform supine<> sit transfer independently without use of  bedrails in order to get into/out of bed at home  Long Term Goal 2: Pt will perform sit<>stand transfer independently with use of RW to be able to transfer to/from various surfaces  Long Term Goal 3: Pt will be able to perform transfer in<>out of car independently with use of RW in order to meet transportation needs  Long Term Goal 4: Pt will be able to ambulate 35ft independently with use of RW in order to ambulate household distances safely  Long Term Goal 5: Pt will be able to ambulate 200ft independently with use of RW in order to ambulate safely in the community  Additional Goals?: Yes  Long term goal 6: Pt will be able to ascend/descend 3 stairs independently and with use of single handrail in order to enter/exit the home safely  Long term goal 7: Pt will improve score of Tinetti Balance assessment by 6 points in order to meet EVETTE and decrease fall risk    Following session, patient left seated in bedside chair with alarm activated. Tray table and call light were left within reach.    Betsy Still, licensed Therapist was present and directly responsible for all treatments provided by, Anitra Judge, SPT

## 2025-06-09 NOTE — PROGRESS NOTES
[facility]  INPATIENT SPEECH THERAPY  [facility]-STRZ 8K IP REHAB  DAILY NOTE    Discharge Recommendations: Outpatient Speech Therapy  DIET ORDER RECOMMENDATIONS: Regular diet, thin liquids  STRATEGIES: Full Upright Position, Small Bite/Sip, and Alternate Solids and Liquids     SLP Individual Minutes  Time In: 1100  Time Out: 1130  Minutes: 30  Timed Code Treatment Minutes: 0 Minutes     Dysphagia Tx: 0 minutes  Speech-Language Tx: 30 minutes     Date: 2025  Patient Name: Lulu Werner      CSN: [id number]   : 1935  (89 y.o.)  Gender: female   Referring Physician:  Pushpa Redd DO   Diagnosis: Acute cerebrovascular accident (CVA) ([facility])  Precautions: Fall risk   Current Diet: Regular diet, thin liquids   Respiratory Status: Room Air  Date of Last MBS/FEES: Not Applicable    Pain:  None reported     Subjective:  Patient resting in chair at ST arrival. Alert and cooperative throughout.    Short-Term Goals:  SHORT TERM GOAL #1:  Goal 1: Patient will consume regular solids/thin liquids with stable pulmonary status and incorporation of trained compensatory strategies to assist with nutrition/hydration measures  INTERVENTIONS:  Not directly addressed due to focus on other goals.      WBC   Date Value Ref Range Status   2025 5.1 4.8 - 10.8 thou/mm3 Final       Right Upper Lobe: Clear  Right Middle Lobe: Clear  Right Lower Lobe: Clear  Left Upper Lobe: Clear  Left Lower Lobe: Clear    SHORT TERM GOAL #2:  Goal 2: Considerations for an instrumental evaluation should adverse changes be conveyed in direct relation to the safety/efficency of the swallow mechanism.  INTERVENTIONS: Not clinically indicated at this time.     SHORT TERM GOAL #3:  Goal 3: Patient will complete verbal expression tasks (including BASIC naming, automatic speech tasks, biographics, picture description, verbal fluency, word/phrase level repetition) with 60% accuracy, max cues to improve functional expressive communication.    INTERVENTIONS: DNT due to focus on other STGs.   Previous Session  Biographics:  Name: independent  Birthday: independent with use of written visual aid      Automatic Speech:  -10: independent   VIGNESH: independent   *with prolonged time complete for thought organization.      Verbal Fluency - Description of Weather:   Patient only could articulate \"rainy\"      Confrontational Namin/8 independent   *prolonged processing time required      Responsive Namin/5 independent     Phrase Repetition:   /10 independent, 4/10 repetition required   *cues required more for decreased working memory/recall than language deficits     SHORT TERM GOAL #4:  Goal 4: Patient will complete auditory/reading comprehension (including direction following of 1-2 step commands, BASIC yes/questions, functional reading comprehension, Object ID froma FO3) with 70% accuracy, mod cues to improve functional recepetive communication.  INTERVENTIONS:   Object ID FO3:  Trial 1: 3/3 independent   Trial 2: 3/3 independent   Trial 3: 3/3 independent   *PROLONGED processing time required, however successful with task     Object ID FO5:  Trial 1: 4/5 independent, 1/5 repetition required   Trial 2: 2/5 independent, 1/5 min cues, 2/5 mod cues  *PROLONGED thought organization and processing time required across task     1 Step Commands:  3/5 independent, 2/5 min cues     SHORT TERM GOAL #5:  Goal 5: Patient will complete written expressive tasks (ie: biographics, common words/phrases) with 50% accuracy, max cues to improve written communication.  INTERVENTIONS: Not directly addressed due to focus on other goals.    SHORT TERM GOAL #6:  Goal 6: Patient will complete functional carryover tasks (o-log, biographics, call light) with 50% accuracy with max cues to improve awareness/participation in current environment and complete formal cognitive screener as clinically indicated  INTERVENTIONS: Not directly addressed due to focus on other

## 2025-06-09 NOTE — PROGRESS NOTES
[facility]  Recreational Therapy  Daily Note  [facility] 8K IP REHAB    Time Spent with Patient: 30 minutes    Date:  6/9/2025       Patient Name: Lulu Werner      MRN: [id number]      YOB: 1935 (89 y.o.)       Gender: female     Referring Practitioner: Pushpa Redd DO    RESTRICTIONS/PRECAUTIONS:  Restrictions/Precautions: Fall Risk, General Precautions     Hearing: Within functional limits    PAIN: 0    SUBJECTIVE:  I don't want to go to a nursing home    OBJECTIVE:  Pt declined going outside or getting out of her room for a little while-talked about her family and shared pictures in her room of them-affect bright and social-some expressive aphasia noted but able to understand-states she does not want to go to a nursing home at discharge-may want some help to come into the home-supportive contact given          Patient Education  New Education Provided: Importance of Leisure,     Electronically signed by: Any Muller, CTRS  Date: 6/9/2025

## 2025-06-09 NOTE — PROGRESS NOTES
[facility]  MH-STRZ 8K IP REHAB  Occupational Therapy  Daily Note    Discharge Recommendations: Home with Outpatient OT and Patient would benefit from continued OT at discharge  Equipment Recommendations:   Continue to address/confirm what equipment pt owns as appropriate/able.      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  MRI indicated interval deteiroation since previous study with new areas of restricted diffusion in L subinsular cortex, L parietal subcortical white matter and R frontal subcortical white mater.  It is noted repeat MRI indicated L hemispheric CVAs secondary to the L carotid occlusion.  Hospital course complicated by a-fib RVR, UTI, edema in RUE as superficial thrombophlebitis present. See Dr. Redd H&P for additional informtion. Pt admitted to the IPR unit on 6/3/25 for further medical care and referred to skilled OT services at this time.    Restrictions/Precautions:  Restrictions/Precautions: Fall Risk, General Precautions     Social/Functional History:  Lives With: Alone  Type of Home: House  Home Layout: Two level, Performs ADL's on one level, Able to Live on Main level with bedroom/bathroom  Home Access: Stairs to enter with rails  Entrance Stairs - Number of Steps: 3 CATHERINE at front and garage entrance  Entrance Stairs - Rails: Right  Home Equipment: Cane, Walker - Rolling   Bathroom Shower/Tub: Walk-in shower  Bathroom Toilet: Standard  Bathroom Equipment: None  Bathroom Accessibility: Accessible       Prior  challenge to promote improved performance with daily occupations.  Activity Tolerance:  Patient tolerance of  treatment: Good treatment tolerance       Plan: Times Per Week: 5x/wk for 60 minutes  Current Treatment Recommendations: Balance training, Functional mobility training, Self-Care / ADL, Safety education & training, Endurance training, ROM, Strengthening, Neuromuscular re-education, Cognitive reorientation, Equipment evaluation, education, & procurement, Home management training, Modalities, Positioning, Wheelchair mobility training, Patient/Caregiver education & training, Gait training, Stair training, Coordination training    Goals  Short Term Goals  Time Frame for Short Term Goals: 1 week  Short Term Goal 1: Pt. to tolerate 5 minutes of standing with Supervision with 1-2 hand release to improve activity tolerance for ADL completion.  Short Term Goal 2: Pt. to retrieve/transport at least 2 items with Supervision to maximize performance with self care prep.  Short Term Goal 3: Pt to open/close 4/5 containers with supervision to maximize performance with grooming routines.  Short Term Goal 4: Pt to sequence through simple meal prep task with 3 or more instructions with SBA min cues to improve engagement in IADLs.  Long Term Goals  Time Frame for Long Term Goals : 2 weeks from OT eval on the IPR unit  Long Term Goal 1: Pt. to demo improved occupational performance as evidenced by a score of 80/100 on the Modified Barthel Index.  Long Term Goal 2: Pt. to complete simple IADL routines with Supervision to progress to PLOF.  Long Term Goal 3: Pt to demo Mod I to transfer to/from toilet to enhance performance with ADL transfers.    Following session, patient left in safe position in chair, with alarm, and call light within reach

## 2025-06-09 NOTE — PROGRESS NOTES
Patient: Lulu Werner  Unit/Bed: 8K-05/005-A  YOB: 1935  MRN: [id number] Acct: [id number]   Admitting Diagnosis: Stroke (cerebrum) (HCC) [I63.9]  Acute cerebrovascular accident (CVA) (HCC) [I63.9]  Admit Date:  6/3/2025  Hospital Day: 6    Assessment:     Principal Problem:    Acute cerebrovascular accident (CVA) (HCC)  Resolved Problems:    * No resolved hospital problems. *      Plan:     Follow the BP.        Subjective:     Patient has no complaint of CP or SOB..   Medication side effects: none    Scheduled Meds:   metoprolol tartrate  100 mg Oral BID    dilTIAZem  120 mg Oral Daily    famotidine  20 mg Oral Daily    multivitamin  1 tablet Oral Daily    fish oil  1 capsule Oral Daily    potassium chloride  10 mEq Oral Daily    triamcinolone   Topical BID    latanoprost  1 drop Both Eyes Nightly    atorvastatin  80 mg Oral Nightly    clopidogrel  75 mg Oral Daily    aspirin  325 mg Oral Daily    enoxaparin  30 mg SubCUTAneous Daily    senna  1 tablet Oral Nightly    docusate sodium  100 mg Oral Daily    polyethylene glycol  17 g Oral Daily    [START ON 6/21/2025] aspirin  81 mg Oral Daily     Continuous Infusions:   sodium chloride       PRN Meds:sodium chloride flush, sodium chloride, melatonin, ondansetron, acetaminophen, magnesium hydroxide, docusate sodium    Review of Systems  Pertinent items are noted in HPI.    Objective:     Patient Vitals for the past 8 hrs:   BP Temp Temp src Pulse Resp SpO2   06/09/25 1945 (!) 149/88 98.2 °F (36.8 °C) Oral 67 18 98 %   06/09/25 1600 134/82 97 °F (36.1 °C) Oral 74 16 96 %     I/O last 3 completed shifts:  In: 1400 [P.O.:1400]  Out: -   No intake/output data recorded.    BP (!) 149/88   Pulse 67   Temp 98.2 °F (36.8 °C) (Oral)   Resp 18   Ht 1.524 m (5')   Wt 58 kg (127 lb 13.9 oz)   SpO2 98%   BMI 24.97 kg/m²     General appearance: alert, appears stated age, and cooperative  Head: Normocephalic, without obvious abnormality, atraumatic  Lungs:  clear to auscultation bilaterally  Heart: regular rate and rhythm, S1, S2 normal, no murmur, click, rub or gallop  Abdomen: soft, non-tender; bowel sounds normal; no masses,  no organomegaly  Extremities: extremities normal, atraumatic, no cyanosis or edema  Skin: Skin color, texture, turgor normal. No rashes or lesions  Neurologic:  weak    Electronically signed by Rene Rouse MD on 6/9/2025 at 7:58 PM

## 2025-06-09 NOTE — PROGRESS NOTES
Physical Medicine & Rehabilitation   Progress Note    Chief Complaint:  CVA    Subjective: Patient seen today in follow-up.  No acute events over the weekend.  Son present.  He is asking how patient is doing with her mobility.  Reviewed this morning's OT note.  Patient overall is doing well.  Son feels that her memory is doing well.  Discussed plan for discharge this Saturday.  Patient and son deny any other questions or concerns at this time.      Rehabilitation:  PT:  Transfers:  Sit to Stand: Contact Guard Assistance  Stand to Sit:Contact Guard Assistance  Ambulation:  Contact Guard Assistance  Distance: 92', 225', 10'  Surface: Level Tile  Device: Rolling Walker  Gait Deviations: Decreased Gait Speed, Decreased Heel Strike Bilaterally, and Mild Path Deviations   *Pt lightly tapping objects on right side of walker at times, verbal cues at times for negotiation and attention to the right side, decreased attention to right  *Pt demonstrates decreased attention to right side, lightly bumping into object  Patient instructed to ambulate and locate cones placed on right and left side of area of therapy gym.  Pt with difficulty scanning both directions equally, keeping head to one side while ambulating rather than looking both ways.  Verbal cues for visual scanning.  Pt needing to ambulate 3 laps through area to locate all 6 cones.    Balance:  Static Standing Balance: Contact Guard Assistance  Dynamic Standing Balance: Contact Guard Assistance, Minimal Assistance       OT:  ADL:   Feeding: Modified Independent.  Increased time required d/t pt being easily distracted.   Grooming: Contact Guard Assistance.  To wash hands standing sink side  Lower Extremity Dressing: Contact Guard Assistance.  Max VCs required for problem solving.   Footwear Management: Minimal Assistance.  To doff/don shoes  Toileting: Minimal Assistance.  Pt incontinent of bowel in brief and demos difficulty problem solving on how to get brief off.

## 2025-06-09 NOTE — CARE COORDINATION
Patient educated on how to use incentive spirometer. Patient verbalized understanding and demonstrated proper use. Emphasized importance and usage of device, with coughing and deep breathing every 4 hours while awake.      Patient has been cheerful and pleasant today. She is doing so much better with medications as far as why she is taking them, how often, and how some are administered (miralax in juicem water, or coffee).  She still refuses Lovenox.     Family has been up to visit today. Education given to attempt to take this patient out in the hallways via wheelchair. Family asked to call for assistance to place this patient in a wheelchair prior to taking her for a ride in her wheelchair.

## 2025-06-09 NOTE — PLAN OF CARE
Problem: Chronic Conditions and Co-morbidities  Goal: Patient's chronic conditions and co-morbidity symptoms are monitored and maintained or improved  6/9/2025 1031 by Garima Mcmahon RN  Outcome: Progressing  Flowsheets (Taken 6/8/2025 0815 by Rolan Pham LPN)  Care Plan - Patient's Chronic Conditions and Co-Morbidity Symptoms are Monitored and Maintained or Improved: Monitor and assess patient's chronic conditions and comorbid symptoms for stability, deterioration, or improvement  6/9/2025 0112 by Kristine Greco RN  Outcome: Progressing     Problem: Discharge Planning  Goal: Discharge to home or other facility with appropriate resources  6/9/2025 1031 by Garima Mcmahon RN  Outcome: Progressing  Flowsheets (Taken 6/8/2025 0815 by Rolan Pham LPN)  Discharge to home or other facility with appropriate resources: Identify barriers to discharge with patient and caregiver  6/9/2025 0112 by Kristine Greco RN  Outcome: Progressing     Problem: Skin/Tissue Integrity  Goal: Skin integrity remains intact  Description: 1.  Monitor for areas of redness and/or skin breakdown2.  Assess vascular access sites hourly3.  Every 4-6 hours minimum:  Change oxygen saturation probe site4.  Every 4-6 hours:  If on nasal continuous positive airway pressure, respiratory therapy assess nares and determine need for appliance change or resting period  6/9/2025 1031 by Garima Mcmahon RN  Outcome: Progressing  Flowsheets (Taken 6/7/2025 2047 by Shilpa Zheng, RN)  Skin Integrity Remains Intact: Monitor for areas of redness and/or skin breakdown  6/9/2025 0112 by Kristine Greco RN  Outcome: Progressing     Problem: Safety - Adult  Goal: Free from fall injury  6/9/2025 1031 by Garima Mcmahon RN  Outcome: Progressing  Flowsheets (Taken 6/4/2025 1038 by Janet Gautam, RN)  Free From Fall Injury:   Instruct family/caregiver on patient safety   Based on caregiver fall risk screen, instruct family/caregiver to ask for assistance with  transferring infant if caregiver noted to have fall risk factors  6/9/2025 0112 by Kristine Greco RN  Outcome: Progressing     Problem: ABCDS Injury Assessment  Goal: Absence of physical injury  6/9/2025 1031 by Garima Mcmahon RN  Outcome: Progressing  Flowsheets (Taken 6/4/2025 1038 by Janet Gautam RN)  Absence of Physical Injury: Implement safety measures based on patient assessment  6/9/2025 0112 by Kristine Greco RN  Outcome: Progressing     Problem: Pain  Goal: Verbalizes/displays adequate comfort level or baseline comfort level  6/9/2025 1031 by Garima Mcmahon RN  Outcome: Progressing  Flowsheets (Taken 6/9/2025 0858)  Verbalizes/displays adequate comfort level or baseline comfort level: Encourage patient to monitor pain and request assistance  6/9/2025 0112 by Kristine Greco RN  Outcome: Progressing     Problem: Nutrition Deficit:  Goal: Optimize nutritional status  6/9/2025 1031 by Garima Mcmahon RN  Outcome: Progressing  Flowsheets (Taken 6/4/2025 1357 by Shonda Martinez)  Nutrient intake appropriate for improving, restoring, or maintaining nutritional needs: Assess nutritional status and recommend course of action  6/9/2025 0112 by Kristine Greco RN  Outcome: Progressing

## 2025-06-09 NOTE — PROGRESS NOTES
[facility]  INPATIENT SPEECH THERAPY  MH-STRZ 8K IP REHAB  DAILY NOTE    Discharge Recommendations: Outpatient Speech Therapy  DIET ORDER RECOMMENDATIONS: Regular diet, thin liquids  STRATEGIES: Full Upright Position, Small Bite/Sip, and Alternate Solids and Liquids     SLP Individual Minutes  Time In: 0800  Time Out: 0830  Minutes: 30  Timed Code Treatment Minutes: 0 Minutes     Dysphagia Tx: 0 minutes  Speech-Language Tx: 30 minutes     Date: 2025  Patient Name: Lulu Werner      CSN: [id number]   : 1935  (89 y.o.)  Gender: female   Referring Physician:  Pushpa Redd DO   Diagnosis: Acute cerebrovascular accident (CVA) (HCC)  Precautions: Fall risk   Current Diet: Regular diet, thin liquids   Respiratory Status: Room Air  Date of Last MBS/FEES: Not Applicable    Pain:  None reported     Subjective:  Patient resting in chair at ST arrival. Alert and cooperative throughout. No family present.     Short-Term Goals:  SHORT TERM GOAL #1:  Goal 1: Patient will consume regular solids/thin liquids with stable pulmonary status and incorporation of trained compensatory strategies to assist with nutrition/hydration measures  INTERVENTIONS:  Not directly addressed due to focus on other goals.      WBC   Date Value Ref Range Status   2025 5.1 4.8 - 10.8 thou/mm3 Final       Right Upper Lobe: Clear  Right Middle Lobe: Clear  Right Lower Lobe: Clear  Left Upper Lobe: Clear  Left Lower Lobe: Clear    SHORT TERM GOAL #2:  Goal 2: Considerations for an instrumental evaluation should adverse changes be conveyed in direct relation to the safety/efficency of the swallow mechanism.  INTERVENTIONS: Not clinically indicated at this time.     SHORT TERM GOAL #3:  Goal 3: Patient will complete verbal expression tasks (including BASIC naming, automatic speech tasks, biographics, picture description, verbal fluency, word/phrase level repetition) with 60% accuracy, max cues to improve functional expressive  communication    Functional Oral Intake Scale: Total Oral Intake: 7.  Total oral intake with no restrictions    EDUCATION:  Learner: Patient  Education:  Education Related to Stroke Diagnosis, Reviewed ST goals and Plan of Care, Reviewed recommendations for follow-up, and Education Related to Potential Risks and Complications Due to Impairment/Illness/Injury  Evaluation of Education: Verbalizes understanding, Needs further instruction, and Family not present    ASSESSMENT/PLAN:  Activity Tolerance:  Patient tolerance of treatment: good.      Assessment/Plan: Patient progressing toward established goals.  Continues to require skilled care of licensed speech pathologist to progress toward achievement of established goals and plan of care.    Plan for Next Session: speech and language interventions, consider dietary analysis during lunch meal       Kelly Singh M.A., CCC-SLP [id number]

## 2025-06-09 NOTE — PLAN OF CARE
Problem: Chronic Conditions and Co-morbidities  Goal: Patient's chronic conditions and co-morbidity symptoms are monitored and maintained or improved  6/9/2025 0112 by Kristine Greco RN  Outcome: Progressing  6/8/2025 1118 by Rolan Pham LPN  Outcome: Progressing  Flowsheets (Taken 6/8/2025 0815)  Care Plan - Patient's Chronic Conditions and Co-Morbidity Symptoms are Monitored and Maintained or Improved: Monitor and assess patient's chronic conditions and comorbid symptoms for stability, deterioration, or improvement     Problem: Discharge Planning  Goal: Discharge to home or other facility with appropriate resources  6/9/2025 0112 by Kristine Greco RN  Outcome: Progressing  6/8/2025 1118 by Rolan Pham LPN  Outcome: Progressing  Flowsheets (Taken 6/8/2025 0815)  Discharge to home or other facility with appropriate resources: Identify barriers to discharge with patient and caregiver     Problem: Skin/Tissue Integrity  Goal: Skin integrity remains intact  Description: 1.  Monitor for areas of redness and/or skin breakdown2.  Assess vascular access sites hourly3.  Every 4-6 hours minimum:  Change oxygen saturation probe site4.  Every 4-6 hours:  If on nasal continuous positive airway pressure, respiratory therapy assess nares and determine need for appliance change or resting period  6/9/2025 0112 by Kristine Greco RN  Outcome: Progressing  6/8/2025 1118 by Rolan Pham LPN  Outcome: Progressing     Problem: Safety - Adult  Goal: Free from fall injury  6/9/2025 0112 by Kristine Greco RN  Outcome: Progressing  6/8/2025 1118 by Rolan Pham LPN  Outcome: Progressing     Problem: ABCDS Injury Assessment  Goal: Absence of physical injury  6/9/2025 0112 by Kristine Greco RN  Outcome: Progressing  6/8/2025 1118 by Rolan Pham LPN  Outcome: Progressing     Problem: Pain  Goal: Verbalizes/displays adequate comfort level or baseline comfort level  6/9/2025 0112 by Kristine Greco RN  Outcome:  Progressing  6/8/2025 1118 by Rolan Pham LPN  Outcome: Progressing  Flowsheets (Taken 6/8/2025 0815)  Verbalizes/displays adequate comfort level or baseline comfort level: Encourage patient to monitor pain and request assistance     Problem: Nutrition Deficit:  Goal: Optimize nutritional status  6/9/2025 0112 by Kristine Greco, RN  Outcome: Progressing  6/8/2025 1118 by Rolan Pham LPN  Outcome: Progressing

## 2025-06-10 PROCEDURE — 97530 THERAPEUTIC ACTIVITIES: CPT

## 2025-06-10 PROCEDURE — 6370000000 HC RX 637 (ALT 250 FOR IP): Performed by: FAMILY MEDICINE

## 2025-06-10 PROCEDURE — 97116 GAIT TRAINING THERAPY: CPT

## 2025-06-10 PROCEDURE — 6370000000 HC RX 637 (ALT 250 FOR IP): Performed by: STUDENT IN AN ORGANIZED HEALTH CARE EDUCATION/TRAINING PROGRAM

## 2025-06-10 PROCEDURE — 97535 SELF CARE MNGMENT TRAINING: CPT

## 2025-06-10 PROCEDURE — 99232 SBSQ HOSP IP/OBS MODERATE 35: CPT | Performed by: NURSE PRACTITIONER

## 2025-06-10 PROCEDURE — 97110 THERAPEUTIC EXERCISES: CPT

## 2025-06-10 PROCEDURE — 1180000000 HC REHAB R&B

## 2025-06-10 PROCEDURE — 92507 TX SP LANG VOICE COMM INDIV: CPT

## 2025-06-10 RX ADMIN — LATANOPROST 1 DROP: 50 SOLUTION OPHTHALMIC at 21:06

## 2025-06-10 RX ADMIN — Medication 1000 MG: at 09:54

## 2025-06-10 RX ADMIN — METOPROLOL 100 MG: 100 TABLET ORAL at 09:53

## 2025-06-10 RX ADMIN — TRIAMCINOLONE ACETONIDE: 1 CREAM TOPICAL at 10:00

## 2025-06-10 RX ADMIN — DOCUSATE SODIUM 100 MG: 100 CAPSULE, LIQUID FILLED ORAL at 09:54

## 2025-06-10 RX ADMIN — FAMOTIDINE 20 MG: 20 TABLET, FILM COATED ORAL at 09:54

## 2025-06-10 RX ADMIN — CLOPIDOGREL BISULFATE 75 MG: 75 TABLET, FILM COATED ORAL at 09:54

## 2025-06-10 RX ADMIN — POTASSIUM CHLORIDE 10 MEQ: 1500 TABLET, EXTENDED RELEASE ORAL at 09:54

## 2025-06-10 RX ADMIN — METOPROLOL 100 MG: 100 TABLET ORAL at 20:59

## 2025-06-10 RX ADMIN — ATORVASTATIN CALCIUM 80 MG: 80 TABLET, FILM COATED ORAL at 21:00

## 2025-06-10 RX ADMIN — Medication 1 TABLET: at 09:53

## 2025-06-10 RX ADMIN — ASPIRIN 325 MG: 325 TABLET, COATED ORAL at 09:54

## 2025-06-10 RX ADMIN — POLYETHYLENE GLYCOL 3350 17 G: 17 POWDER, FOR SOLUTION ORAL at 09:53

## 2025-06-10 RX ADMIN — SENNOSIDES 8.6 MG: 8.6 TABLET, FILM COATED ORAL at 21:04

## 2025-06-10 RX ADMIN — DILTIAZEM HYDROCHLORIDE 120 MG: 120 CAPSULE, EXTENDED RELEASE ORAL at 09:54

## 2025-06-10 ASSESSMENT — PAIN SCALES - GENERAL: PAINLEVEL_OUTOF10: 0

## 2025-06-10 NOTE — PROGRESS NOTES
model/aid  *Unable to independently produce or copy basic words such as 'grape', letter combinations at random       SHORT TERM GOAL #6:  Goal 6: Patient will complete functional carryover tasks (o-log, biographics, call light) with 50% accuracy with max cues to improve awareness/participation in current environment and complete formal cognitive screener as clinically indicated  INTERVENTIONS: Not directly addressed due to focus on other goals.      Long-Term Goals:  Time Frame for Long Term Goals: 3 weeks    LONG TERM GOAL #1:  Goal 1: Patient will consume regular solids/thin liquids with stable pulmonary status and incorporation of trained compensatory strategies to assist with nutrition/hydration measures    LONG TERM GOAL #2:  Goal 2: Patient will improve receptive/expressive language skills to a level of min assist to improve every day communication    Functional Oral Intake Scale: Total Oral Intake: 7.  Total oral intake with no restrictions    EDUCATION:  Learner: Patient  Education:  Education Related to Stroke Diagnosis, Reviewed ST goals and Plan of Care, Reviewed recommendations for follow-up, and Education Related to Potential Risks and Complications Due to Impairment/Illness/Injury  Evaluation of Education: Verbalizes understanding, Needs further instruction, and Family not present    ASSESSMENT/PLAN:  Activity Tolerance:  Patient tolerance of treatment: good.      Assessment/Plan: Patient progressing toward established goals.  Continues to require skilled care of licensed speech pathologist to progress toward achievement of established goals and plan of care.    Plan for Next Session: speech and language interventions, consider dietary analysis during lunch meal       Yajaira Pierce M.A., CF-SLP  [id number]

## 2025-06-10 NOTE — CARE COORDINATION
Up with 1 assist via walker, continent of bowel and bladder. Patient having difficulty expressing herself at times. Re-orientation done several times with the use of call light, patient attempted to get up on the bed few times. With tele sitter in the room. No pain or discomfort noted. Electronically signed by Carmelo Eduardo RN on 6/10/2025 at 4:32 AM

## 2025-06-10 NOTE — PROGRESS NOTES
[facility][note 3]  INPATIENT SPEECH THERAPY  MH-STRZ 8K IP REHAB  DAILY NOTE    Discharge Recommendations: Outpatient Speech Therapy  DIET ORDER RECOMMENDATIONS: Regular diet, thin liquids  STRATEGIES: Full Upright Position, Small Bite/Sip, and Alternate Solids and Liquids     SLP Individual Minutes  Time In: 1300  Time Out: 1330  Minutes: 30  Timed Code Treatment Minutes: 30 Minutes     Dysphagia Tx: 0 minutes  Speech-Language Tx: 30 minutes     Date: 2025  Patient Name: Lulu Werner      CSN: [id number]   : 1935  (89 y.o.)  Gender: female   Referring Physician:  Pushpa Redd DO   Diagnosis: Acute cerebrovascular accident (CVA) ([facility])  Precautions: Fall risk   Current Diet: Regular diet, thin liquids   Respiratory Status: Room Air  Date of Last MBS/FEES: Not Applicable    Pain:  None reported     Subjective:  Patient presented sitting in chair, alert. Patient with significant expressive and receptive aphasia + confusion/frustration across entirety of session. NO family present.     Short-Term Goals:  SHORT TERM GOAL #1:  Goal 1: Patient will consume regular solids/thin liquids with stable pulmonary status and incorporation of trained compensatory strategies to assist with nutrition/hydration measures  INTERVENTIONS:  Not directly addressed due to focus on other goals.    WBC   Date Value Ref Range Status   2025 5.1 4.8 - 10.8 thou/mm3 Final       Right Upper Lobe: Clear  Right Middle Lobe: Clear  Right Lower Lobe: Clear  Left Upper Lobe: Clear  Left Lower Lobe: Clear    SHORT TERM GOAL #2:  Goal 2: Considerations for an instrumental evaluation should adverse changes be conveyed in direct relation to the safety/efficency of the swallow mechanism.  INTERVENTIONS: Not clinically indicated at this time.       SHORT TERM GOAL #3:  Goal 3: Patient will complete verbal expression tasks (including BASIC naming, automatic speech tasks, biographics, picture description, verbal fluency, word/phrase

## 2025-06-10 NOTE — PROGRESS NOTES
[facility]  [facility] 8K IP REHAB  Occupational Therapy  Daily Note    Discharge Recommendations: Patient would benefit from continued OT at discharge  Equipment Recommendations:   Continue to address/confirm what equipment pt owns as appropriate/able.    Time In: 1400  Time Out: 1500  Timed Code Treatment Minutes: 60 Minutes  Minutes: 60          Date: 6/10/2025  Patient Name: Lulu Werner,   Gender: female      Room: [address] [place] [address]  MRN: [id number]  : 1935  (89 y.o.)  Referring Practitioner: Pushpa Redd DO  Diagnosis: Acute cerebrovascular accident (CVA) (HCC)  Additional Pertinent Hx: Lulu Collins whom presented to the ED from [facility] with 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  MRI indicated interval deteiroation since previous study with new areas of restricted diffusion in L subinsular cortex, L parietal subcortical white matter and R frontal subcortical white mater.  It is noted repeat MRI indicated L hemispheric CVAs secondary to the L carotid occlusion.  Hospital course complicated by a-fib RVR, UTI, edema in RUE as superficial thrombophlebitis present. See Dr. Redd H&P for additional informtion. Pt admitted to the IPR unit on 6/3/25 for further medical care and referred to skilled OT services at this time.    Restrictions/Precautions:  Restrictions/Precautions: Fall Risk, General Precautions     Social/Functional History:  Lives With: Alone  Type of Home: House  Home Layout: Two level, Performs ADL's on one level, Able to Live on Main level with bedroom/bathroom  Home Access: Stairs to enter with rails  Entrance Stairs - Number of Steps: 3 CATHERINE at front and garage entrance  Entrance Stairs - Rails: Right  Home Equipment: Cane, Walker - Rolling   Bathroom Shower/Tub: Walk-in shower  Bathroom Toilet: Standard  Bathroom Equipment: None  Bathroom Accessibility: Accessible       Prior Level of Assist for ADLs:  Goals  Time Frame for Short Term Goals: 1 week  Short Term Goal 1: Pt. to tolerate 5 minutes of standing with Supervision with 1-2 hand release to improve activity tolerance for ADL completion.  Short Term Goal 2: Pt. to retrieve/transport at least 2 items with Supervision to maximize performance with self care prep.  Short Term Goal 3: Pt to open/close 4/5 containers with supervision to maximize performance with grooming routines.  Short Term Goal 4: Pt to sequence through simple meal prep task with 3 or more instructions with SBA min cues to improve engagement in IADLs.  Long Term Goals  Time Frame for Long Term Goals : 2 weeks from OT annette on the IPR unit  Long Term Goal 1: Pt. to demo improved occupational performance as evidenced by a score of 80/100 on the Modified Barthel Index.  Long Term Goal 2: Pt. to complete simple IADL routines with Supervision to progress to PLOF.  Long Term Goal 3: Pt to demo Mod I to transfer to/from toilet to enhance performance with ADL transfers.    Following session, patient left in safe position in bed, with alarm, and call light within reach

## 2025-06-10 NOTE — PROGRESS NOTES
RECREATIONAL THERAPY  -STRZ 8K IP REHAB      Date:  6/10/2025            Patient Name: Lulu Werner           MRN: [id number]  Acct: [id number]          YOB: 1935 (89 y.o.)       Gender: female      Physician: Referring Practitioner: Pushpa Redd DO    REASON FOR MISSED TREATMENT:  Pt declined getting out of her room this am -content     Any Muller, CTRS    6/10/2025

## 2025-06-10 NOTE — PROGRESS NOTES
Physical Medicine & Rehabilitation   Progress Note    Chief Complaint:  CVA    Subjective:   6/9/25: Patient seen today in follow-up.  No acute events over the weekend.  Son present.  He is asking how patient is doing with her mobility.  Reviewed this morning's OT note.  Patient overall is doing well.  Son feels that her memory is doing well.  Discussed plan for discharge this Saturday.  Patient and son deny any other questions or concerns at this time.    6/10/25: patient seen today in follow up.  Patient reports good sleep.  She initially denied pain, then she reported \"knee pain for pain \".  Specifically asked patient if she wanted Tylenol, patient denied this need. + BM 6/8.  Patient denies any other questions or concerns at this time.  Planning BMP tomorrow      Rehabilitation:  PT:  Transfers:  Sit to Stand: Contact Guard Assistance  Stand to Sit:Contact Guard Assistance  Ambulation:  Contact Guard Assistance  Distance: 92', 225', 10'  Surface: Level Tile  Device: Rolling Walker  Gait Deviations: Decreased Gait Speed, Decreased Heel Strike Bilaterally, and Mild Path Deviations   *Pt lightly tapping objects on right side of walker at times, verbal cues at times for negotiation and attention to the right side, decreased attention to right  *Pt demonstrates decreased attention to right side, lightly bumping into object  Patient instructed to ambulate and locate cones placed on right and left side of area of therapy gym.  Pt with difficulty scanning both directions equally, keeping head to one side while ambulating rather than looking both ways.  Verbal cues for visual scanning.  Pt needing to ambulate 3 laps through area to locate all 6 cones.    Balance:  Static Standing Balance: Contact Guard Assistance  Dynamic Standing Balance: Contact Guard Assistance, Minimal Assistance       OT:  ADL:   Feeding: Modified Independent.  Increased time required d/t pt being easily distracted.   Grooming: Contact Guard  vomiting  GENITOURINARY:  negative for dysuria  SKIN:  negative for rash  MUSCULOSKELETAL:  negative for  pain  NEUROLOGICAL:  positive for speech problems and weakness  System review otherwise negative      Objective:  BP (!) 149/88   Pulse 67   Temp 98.2 °F (36.8 °C) (Oral)   Resp 18   Ht 1.524 m (5')   Wt 58 kg (127 lb 13.9 oz)   SpO2 98%   BMI 24.97 kg/m²   Patient is awake and alert, sitting up in bed. Son at bedside  Orientation: limited due to aphasia  Mood: within normal limits  Affect: calm  General appearance: Patient is well nourished, well developed, well groomed and in no acute distress     Memory: Unable to assess given aphasia  Attention/Concentration: seems attentive to conversation  Language: Mixed expressive> receptive aphasia.     Cranial Nerves: Some mild right-sided facial asymmetry.  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  Breathing comfortably on room air without increased work of breathing  Abdomen: non-distended  Skin: warm and dry, no rash or erythema on exposed skin      Diagnostics:   No results found for this or any previous visit (from the past 24 hours).        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  Rehabilitation services will include PT, OT, and SLP/RT in order to improve functional status prior to discharge.  Family education and training

## 2025-06-10 NOTE — PLAN OF CARE
Problem: Chronic Conditions and Co-morbidities  Goal: Patient's chronic conditions and co-morbidity symptoms are monitored and maintained or improved  Outcome: Progressing     Problem: Discharge Planning  Goal: Discharge to home or other facility with appropriate resources  Outcome: Progressing     Problem: Skin/Tissue Integrity  Goal: Skin integrity remains intact  Description: 1.  Monitor for areas of redness and/or skin breakdown2.  Assess vascular access sites hourly3.  Every 4-6 hours minimum:  Change oxygen saturation probe site4.  Every 4-6 hours:  If on nasal continuous positive airway pressure, respiratory therapy assess nares and determine need for appliance change or resting period  Outcome: Progressing     Problem: Safety - Adult  Goal: Free from fall injury  Outcome: Progressing     Problem: ABCDS Injury Assessment  Goal: Absence of physical injury  Outcome: Progressing     Problem: Pain  Goal: Verbalizes/displays adequate comfort level or baseline comfort level  Outcome: Progressing     Problem: Nutrition Deficit:  Goal: Optimize nutritional status  6/10/2025 1750 by Garima Mcmahon RN  Outcome: Progressing  6/10/2025 0843 by Lurdes Kelly RD  Outcome: Progressing  Flowsheets (Taken 6/10/2025 0843)  Nutrient intake appropriate for improving, restoring, or maintaining nutritional needs:   Assess nutritional status and recommend course of action   Monitor oral intake, labs, and treatment plans   Recommend appropriate diets, oral nutritional supplements, and vitamin/mineral supplements

## 2025-06-10 NOTE — PLAN OF CARE
Problem: Discharge Planning  Goal: Discharge to home or other facility with appropriate resources  Outcome: Progressing  Flowsheets (Taken 6/9/2025 1945)  Discharge to home or other facility with appropriate resources: Identify barriers to discharge with patient and caregiver     Problem: Skin/Tissue Integrity  Goal: Skin integrity remains intact  Description: 1.  Monitor for areas of redness and/or skin breakdown2.  Assess vascular access sites hourly3.  Every 4-6 hours minimum:  Change oxygen saturation probe site4.  Every 4-6 hours:  If on nasal continuous positive airway pressure, respiratory therapy assess nares and determine need for appliance change or resting period  Outcome: Progressing  Flowsheets  Taken 6/10/2025 0031  Skin Integrity Remains Intact: Monitor for areas of redness and/or skin breakdown  Taken 6/9/2025 1945  Skin Integrity Remains Intact: Monitor for areas of redness and/or skin breakdown     Problem: Safety - Adult  Goal: Free from fall injury  Outcome: Progressing     Problem: ABCDS Injury Assessment  Goal: Absence of physical injury  Outcome: Progressing     Problem: Pain  Goal: Verbalizes/displays adequate comfort level or baseline comfort level  Outcome: Progressing  Flowsheets (Taken 6/9/2025 1945)  Verbalizes/displays adequate comfort level or baseline comfort level:   Encourage patient to monitor pain and request assistance   Assess pain using appropriate pain scale     Problem: Nutrition Deficit:  Goal: Optimize nutritional status  Outcome: Progressing     Problem: Chronic Conditions and Co-morbidities  Goal: Patient's chronic conditions and co-morbidity symptoms are monitored and maintained or improved  Outcome: Progressing  Flowsheets (Taken 6/9/2025 1945)  Care Plan - Patient's Chronic Conditions and Co-Morbidity Symptoms are Monitored and Maintained or Improved: Monitor and assess patient's chronic conditions and comorbid symptoms for stability, deterioration, or improvement

## 2025-06-10 NOTE — PROGRESS NOTES
[facility]  INPATIENT PHYSICAL THERAPY  DAILY NOTE  [facility] 8K IP REHAB - 8K--A      Discharge Recommendations: Patient would benefit from continued PT at discharge  Equipment Recommendations:  (Continue to assess for equipment needs)               Time In: 730  Time Out: 830  Timed Code Treatment Minutes: 60 Minutes  Minutes: 60          Date: 6/10/2025  Patient Name: Lulu Werner,  Gender:  female        MRN: [id number]  : 1935  (89 y.o.)     Referring Practitioner: Pushpa Redd DO  Diagnosis: Acute cerebrovascular accident (CVA) (HCC)  Additional Pertinent Hx: Per EMR: \"Acute cerebrovascular accident (CVA) (HCC)  Additional Pertinent Hx: Lulu Collins whom presented to the ED from [facility] with 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  MRI indicated interval deteiroation since previous study with new areas of restricted diffusion in L subinsular cortex, L parietal subcortical white matter and R frontal subcortical white mater.  It is noted repeat MRI indicated L hemispheric CVAs secondary to the L carotid occlusion.  Hospital course complicated by a-fib RVR, UTI, edema in RUE as superficial thrombophlebitis present. See Dr. Redd H&P for additional informtion. Pt admitted to the IPR unit on 6/3/25 for further medical care and referred to skilled PT services at this time.\"     Prior Level of Function:  Lives With: Alone  Type of Home: House  Home Layout: Two level, Performs ADL's on one level, Able to Live on Main level with bedroom/bathroom  Home Access: Stairs to enter with rails  Entrance Stairs - Number of Steps: 3 CATHERINE at front and garage entrance  Entrance Stairs - Rails: Right  Home Equipment: Cane, Walker - Rolling   Bathroom Shower/Tub: Walk-in shower  Bathroom Toilet: Standard  Bathroom Equipment: None  Bathroom Accessibility: Accessible    Prior Level of Assist for ADLs: Independent  Prior Level of  of bed at home  Short Term Goal 2: Pt will perform sit<>stand transfer with use of RW and no more than supervision to be able to transfer to/from various surfaces  Short Term Goal 3: Pt will be able to perform transfer in<>out of car with use of RW and no more than supervision in order to meet transportation needs  Short Term Goal 4: Pt will be able to ambulate 35ft with use of RW and no more than supervision in order to ambulate household distances safely  Short Term Goal 5: Pt will be able to ascend/descend 3 stairs with use of amadeo handrail and no more than supervision in order to enter/exit the home safely  Long Term Goals  Time Frame for Long Term Goals : 3 weeks  Long Term Goal 1: Pt will perform supine<> sit transfer independently without use of bedrails in order to get into/out of bed at home  Long Term Goal 2: Pt will perform sit<>stand transfer independently with use of RW to be able to transfer to/from various surfaces  Long Term Goal 3: Pt will be able to perform transfer in<>out of car independently with use of RW in order to meet transportation needs  Long Term Goal 4: Pt will be able to ambulate 35ft independently with use of RW in order to ambulate household distances safely  Long Term Goal 5: Pt will be able to ambulate 200ft independently with use of RW in order to ambulate safely in the community  Additional Goals?: Yes  Long term goal 6: Pt will be able to ascend/descend 3 stairs independently and with use of single handrail in order to enter/exit the home safely  Long term goal 7: Pt will improve score of Tinetti Balance assessment by 6 points in order to meet EVETTE and decrease fall risk    Following session, patient left in safe position in chair, with alarm, and call light within reach

## 2025-06-10 NOTE — PROGRESS NOTES
Comprehensive Nutrition Assessment    Type and Reason for Visit: Reassess    Nutrition Recommendations/Plan:   Continue diet per SLP recommendations.   Continue vanilla Might Shakes TID.   Continue chopped meats.   Continue MVI.      Malnutrition Assessment:  Malnutrition Status: Moderate malnutrition  Context: Acute Illness       Findings of the 6 clinical characteristics of malnutrition:  Energy Intake:  Mild decrease in energy intake  Weight Loss:   (-10.3% in 3 months however difficult to evaluate with edema, CHF)     Body Fat Loss:  Mild body fat loss Orbital, Triceps, Buccal region   Muscle Mass Loss:  Mild muscle mass loss Clavicles (pectoralis & deltoids), Temples (temporalis), Calf (gastrocnemius), Thigh (quadriceps)  Fluid Accumulation:   (CHF) Extremities   Strength:  Not Performed    Nutrition Assessment:    Pt. moderately malnourished AEB criteria as listed above.  At risk for further nutrition compromise r/t CVA 5/30 and underlying medical condition (hx: CKD stage 3a, Afib, HTN, HLD, GERD, iron deficiency anemia, glaucoma, macular degeneration).       Nutrition Related Findings:    Pt. Report/Treatments/Miscellaneous: Patient seen, sitting up in chair in room, waiting on breakfast. Patient had difficulty with word finding and got frustrated when she was unable to answer questions. Patient reports she likes Mighty Shakes.   GI Status: Last BM 6/8  Wound:  (skin abbrasions)   Edema: generalized non-pitting edema, per flowsheet   Pertinent Labs:   Lab Results   Component Value Date    LABA1C 5.8 05/27/2025     Recent Labs     06/09/25  0626      K 4.1      GLUCOSE 109   BUN 24*   CREATININE 1.1*     Pertinent Medications: statin, plavix, cardizem, colace, lovenox, pepcid, metoprolol, MVI, glycolax, senna    Current Nutrition Intake & Therapies:    Recent PO intake: 51-75%  Recent Supplement Intake:  (unable to assess d/t no documentation in EMR and patient was having trouble with word

## 2025-06-10 NOTE — CARE COORDINATION
Patient educated on how to use incentive spirometer. Patient verbalized understanding and demonstrated proper use. Emphasized importance and usage of device, with coughing and deep breathing every 4 hours while awake.     Patient worked well today with PT and OT. Patient took her medications today with no complications. She continues to refuse Lovenox for fear of \"bleeding\".     No complaints of pain or discomfort this shift. She has been smiling today and pleasant to work with.

## 2025-06-10 NOTE — PROGRESS NOTES
Physical Medicine & Rehabilitation   Progress Note    Chief Complaint:  CVA    Subjective:  Patient seen and examined. No acute events overnight.  She reports feeling \"pretty good\". She is asking \"how long\" she will be on rehab. We dicussed anticipated DC date of 6/14. No reports of any CP or SOB. No reports of any significant changes to bowel or bladder. Last documented BM was on 6/10/2025.      Rehabilitation:  PT 6/10/2025:  Transfers:  Sit to Stand: Contact Guard Assistance, with increased time for completion, to/from chair with arms  Stand to Sit:Contact Guard Assistance, with increased time for completion, cues for hand placement, cues for alignment to surface and for safety with assistive device     Ambulation:  Contact Guard Assistance  Distance: 130'x1 and short distances in room  Surface: Level Tile  Device: Rolling Walker  Gait Deviations: Forward Flexed Posture, Slow Lindsay, Decreased Step Length Bilaterally, Decreased Gait Speed, and Mild Path Deviations      Balance:  Static Sitting Balance:  Modified Independent  Dynamic Sitting Balance: Supervision  Static Standing Balance: Contact Guard Assistance  Dynamic Standing Balance: Contact Guard Assistance  - Pt completed functional tasks at toilet and at bathroom sink with assist for stability and safety. Various functional tasks completed with and without UE support in sitting and standing, Pt stood statically without UE support, pt able to complete tasks without physical assist, No LOBs, cues needed for safety     Exercise:  Patient was guided in 1 set(s) 15 reps of exercises to both lower extremities: Glut sets, Seated marches, Seated hamstring curls, Seated heel/toe raises, Long arc quads, Seated isometric hip adduction, and Seated abduction/adduction.  Exercises were completed for increased independence with functional mobility.    OT 6/10/2025:  ADL:   Grooming: Supervision.  Pt standing at sink to wash B hands this date.  Toileting: Supervision.

## 2025-06-11 LAB
ANION GAP SERPL CALC-SCNC: 11 MEQ/L (ref 8–16)
BUN SERPL-MCNC: 25 MG/DL (ref 8–23)
CALCIUM SERPL-MCNC: 9.3 MG/DL (ref 8.8–10.2)
CHLORIDE SERPL-SCNC: 105 MEQ/L (ref 98–111)
CO2 SERPL-SCNC: 22 MEQ/L (ref 22–29)
CREAT SERPL-MCNC: 1.1 MG/DL (ref 0.5–0.9)
GFR SERPL CREATININE-BSD FRML MDRD: 48 ML/MIN/1.73M2
GLUCOSE SERPL-MCNC: 105 MG/DL (ref 74–109)
POTASSIUM SERPL-SCNC: 4.7 MEQ/L (ref 3.5–5.2)
SODIUM SERPL-SCNC: 138 MEQ/L (ref 135–145)

## 2025-06-11 PROCEDURE — 1180000000 HC REHAB R&B

## 2025-06-11 PROCEDURE — 99232 SBSQ HOSP IP/OBS MODERATE 35: CPT | Performed by: STUDENT IN AN ORGANIZED HEALTH CARE EDUCATION/TRAINING PROGRAM

## 2025-06-11 PROCEDURE — 6370000000 HC RX 637 (ALT 250 FOR IP): Performed by: STUDENT IN AN ORGANIZED HEALTH CARE EDUCATION/TRAINING PROGRAM

## 2025-06-11 PROCEDURE — 92526 ORAL FUNCTION THERAPY: CPT | Performed by: SPEECH-LANGUAGE PATHOLOGIST

## 2025-06-11 PROCEDURE — 92507 TX SP LANG VOICE COMM INDIV: CPT | Performed by: SPEECH-LANGUAGE PATHOLOGIST

## 2025-06-11 PROCEDURE — 97530 THERAPEUTIC ACTIVITIES: CPT

## 2025-06-11 PROCEDURE — 97116 GAIT TRAINING THERAPY: CPT

## 2025-06-11 PROCEDURE — 97110 THERAPEUTIC EXERCISES: CPT

## 2025-06-11 PROCEDURE — 6370000000 HC RX 637 (ALT 250 FOR IP): Performed by: FAMILY MEDICINE

## 2025-06-11 PROCEDURE — 80048 BASIC METABOLIC PNL TOTAL CA: CPT

## 2025-06-11 PROCEDURE — 97535 SELF CARE MNGMENT TRAINING: CPT

## 2025-06-11 PROCEDURE — 36415 COLL VENOUS BLD VENIPUNCTURE: CPT

## 2025-06-11 RX ORDER — DILTIAZEM HYDROCHLORIDE 180 MG/1
180 CAPSULE, COATED, EXTENDED RELEASE ORAL DAILY
Status: DISCONTINUED | OUTPATIENT
Start: 2025-06-12 | End: 2025-06-14 | Stop reason: HOSPADM

## 2025-06-11 RX ADMIN — METOPROLOL 100 MG: 100 TABLET ORAL at 08:03

## 2025-06-11 RX ADMIN — Medication 1000 MG: at 08:03

## 2025-06-11 RX ADMIN — CLOPIDOGREL BISULFATE 75 MG: 75 TABLET, FILM COATED ORAL at 08:03

## 2025-06-11 RX ADMIN — Medication 1 TABLET: at 08:03

## 2025-06-11 RX ADMIN — METOPROLOL 100 MG: 100 TABLET ORAL at 22:21

## 2025-06-11 RX ADMIN — DILTIAZEM HYDROCHLORIDE 120 MG: 120 CAPSULE, EXTENDED RELEASE ORAL at 08:03

## 2025-06-11 RX ADMIN — ASPIRIN 325 MG: 325 TABLET, COATED ORAL at 08:02

## 2025-06-11 RX ADMIN — LATANOPROST 1 DROP: 50 SOLUTION OPHTHALMIC at 22:22

## 2025-06-11 RX ADMIN — TRIAMCINOLONE ACETONIDE: 1 CREAM TOPICAL at 08:02

## 2025-06-11 RX ADMIN — ATORVASTATIN CALCIUM 80 MG: 80 TABLET, FILM COATED ORAL at 22:21

## 2025-06-11 RX ADMIN — FAMOTIDINE 20 MG: 20 TABLET, FILM COATED ORAL at 08:03

## 2025-06-11 RX ADMIN — POTASSIUM CHLORIDE 10 MEQ: 1500 TABLET, EXTENDED RELEASE ORAL at 08:03

## 2025-06-11 ASSESSMENT — PAIN SCALES - WONG BAKER
WONGBAKER_NUMERICALRESPONSE: NO HURT

## 2025-06-11 ASSESSMENT — PAIN SCALES - GENERAL
PAINLEVEL_OUTOF10: 0

## 2025-06-11 NOTE — PLAN OF CARE
Problem: Chronic Conditions and Co-morbidities  Goal: Patient's chronic conditions and co-morbidity symptoms are monitored and maintained or improved  6/11/2025 0424 by Pattie Pritchard RN  Outcome: Progressing  Flowsheets (Taken 6/11/2025 0424)  Care Plan - Patient's Chronic Conditions and Co-Morbidity Symptoms are Monitored and Maintained or Improved:   Monitor and assess patient's chronic conditions and comorbid symptoms for stability, deterioration, or improvement   Collaborate with multidisciplinary team to address chronic and comorbid conditions and prevent exacerbation or deterioration     Problem: Skin/Tissue Integrity  Goal: Skin integrity remains intact  Description: 1.  Monitor for areas of redness and/or skin breakdown2.  Assess vascular access sites hourly3.  Every 4-6 hours minimum:  Change oxygen saturation probe site4.  Every 4-6 hours:  If on nasal continuous positive airway pressure, respiratory therapy assess nares and determine need for appliance change or resting period  6/11/2025 0424 by Pattie Pritchard RN  Outcome: Progressing  Flowsheets (Taken 6/11/2025 0424)  Skin Integrity Remains Intact:   Monitor for areas of redness and/or skin breakdown   Pressure redistribution bed/mattress (bed type)   Check visual cues for pain     Problem: Safety - Adult  Goal: Free from fall injury  6/11/2025 0424 by Pattie Pritchard RN  Outcome: Progressing  Flowsheets (Taken 6/11/2025 0424)  Free From Fall Injury: Instruct family/caregiver on patient safety     Problem: Pain  Goal: Verbalizes/displays adequate comfort level or baseline comfort level  6/11/2025 0424 by Pattie Pritchard RN  Outcome: Progressing

## 2025-06-11 NOTE — PROGRESS NOTES
[facility]  MH-STRZ 8K IP REHAB  Occupational Therapy  Progress Note    Discharge Recommendations: Home with Outpatient OT and Patient would benefit from continued OT at discharge  Equipment Recommendations:   Continue to address/confirm what equipment pt owns as appropriate/able.       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  MRI indicated interval deteiroation since previous study with new areas of restricted diffusion in L subinsular cortex, L parietal subcortical white matter and R frontal subcortical white mater.  It is noted repeat MRI indicated L hemispheric CVAs secondary to the L carotid occlusion.  Hospital course complicated by a-fib RVR, UTI, edema in RUE as superficial thrombophlebitis present. See Dr. Redd H&P for additional informtion. Pt admitted to the IPR unit on 6/3/25 for further medical care and referred to skilled OT services at this time.    Restrictions/Precautions:  Restrictions/Precautions: Fall Risk, General Precautions    Social/Functional History:  Lives With: Alone  Type of Home: House  Home Layout: Two level, Performs ADL's on one level, Able to Live on Main level with bedroom/bathroom  Home Access: Stairs to enter with rails  Entrance Stairs - Number of Steps: 3 CATHERINE at front and garage entrance  Entrance Stairs - Rails: Right  Home Equipment: Cane, Walker - Rolling   Bathroom Shower/Tub: Walk-in shower  Bathroom Toilet: Standard  Bathroom Equipment: None  Bathroom Accessibility: Accessible      by a score of 80/100 on the Modified Barthel Index. GOAL NOT MET  Long Term Goal 2: Pt. to complete simple IADL routines with Supervision to progress to PLOF. GOAL NOT FULLY MET  Long Term Goal 3: Pt to demo Mod I to transfer to/from toilet to enhance performance with ADL transfers. GOAL NOT MET      Revised Short-Term Goals  Short Term Goals  Time Frame for Short Term Goals: 1 week  Short Term Goal 1: GOAL MET  Short Term Goal 2: Pt. to retrieve/transport at least 2 items with Supervision to maximize performance with self care prep.  Short Term Goal 3: Pt to open/close 4/5 containers with supervision to maximize performance with grooming routines.  Short Term Goal 4: Pt to sequence through simple meal prep task with 3 or more instructions with SBA min cues to improve engagement in IADLs.  Long Term Goals  Time Frame for Long Term Goals : 2 weeks from OT eval on the IPR unit  Long Term Goal 1: Pt. to demo improved occupational performance as evidenced by a score of 80/100 on the Modified Barthel Index.  Long Term Goal 2: Pt. to complete simple IADL routines with Supervision to progress to PLOF.  Long Term Goal 3: Pt to demo Mod I to transfer to/from toilet to enhance performance with ADL transfers.    Following session, patient left in safe position in chair, with alarm, and call light within reach

## 2025-06-11 NOTE — CASE COMMUNICATION
Patient has telesitter in room, and so far tonight has not set it off. Patient gets agitated quickly, asking why we need to do things and not wanting to complete directions. Challenging to educate. Patient is continent of urine tonight, and is appropriately using her call light. Denies pain. Reorientation, and reassurance given.

## 2025-06-11 NOTE — PLAN OF CARE
Problem: Chronic Conditions and Co-morbidities  Goal: Patient's chronic conditions and co-morbidity symptoms are monitored and maintained or improved  6/11/2025 1203 by Garima Mcmahon RN  Outcome: Progressing  Flowsheets (Taken 6/11/2025 0745)  Care Plan - Patient's Chronic Conditions and Co-Morbidity Symptoms are Monitored and Maintained or Improved:   Monitor and assess patient's chronic conditions and comorbid symptoms for stability, deterioration, or improvement   Collaborate with multidisciplinary team to address chronic and comorbid conditions and prevent exacerbation or deterioration   Update acute care plan with appropriate goals if chronic or comorbid symptoms are exacerbated and prevent overall improvement and discharge  6/11/2025 0424 by Pattie Pritchard, RN  Outcome: Progressing  Flowsheets (Taken 6/11/2025 0424)  Care Plan - Patient's Chronic Conditions and Co-Morbidity Symptoms are Monitored and Maintained or Improved:   Monitor and assess patient's chronic conditions and comorbid symptoms for stability, deterioration, or improvement   Collaborate with multidisciplinary team to address chronic and comorbid conditions and prevent exacerbation or deterioration     Problem: Discharge Planning  Goal: Discharge to home or other facility with appropriate resources  6/11/2025 1203 by Garima Mcmahon, RN  Outcome: Progressing  Flowsheets (Taken 6/11/2025 0745)  Discharge to home or other facility with appropriate resources:   Identify barriers to discharge with patient and caregiver   Arrange for needed discharge resources and transportation as appropriate   Identify discharge learning needs (meds, wound care, etc)   Arrange for interpreters to assist at discharge as needed   Refer to discharge planning if patient needs post-hospital services based on physician order or complex needs related to functional status, cognitive ability or social support system  6/11/2025 0424 by Pattie Pritchard, RN  Outcome:

## 2025-06-11 NOTE — CARE COORDINATION
Patient worked well with PT and OT today. She was upset this morning because her therapy was later in the morning. Says \" well all I am doing is just sitting here\". Patient has been pleasant otherwise.     Cardizem dosage has been adjust due to high blood pressures.

## 2025-06-11 NOTE — PROGRESS NOTES
Physical Medicine & Rehabilitation   Progress Note    Chief Complaint:  CVA    Subjective: Patient seen independently this morning and will be seen again on rounds with SAEED Duran MSW, following patient's inpatient Rehab Team Conference. Patient reports reports doing okay.  States that she is waiting on breakfast.  No reports of any chest pain or shortness of breath.  No reports of any significant changes to bowel or bladder her last documented BM was on 6/11/2025.  She continues to tolerate therapies.      Rehabilitation:PT, OT, and SLP updates will be reviewed during team rounds. See separate note.       Review of Systems:  CONSTITUTIONAL:  negative for  fevers and chills  EYES:  positive for blindness in right eye  RESPIRATORY: Negative for dyspnea or wheezing  CARDIOVASCULAR:  negative for chest pain  GASTROINTESTINAL:  negative for nausea and vomiting  GENITOURINARY:  negative for dysuria  SKIN:  negative for rash  MUSCULOSKELETAL:  negative for  pain  NEUROLOGICAL:  positive for speech problems and weakness  System review otherwise negative      Objective:  BP (!) 120/90   Pulse 56   Temp 97.6 °F (36.4 °C) (Oral)   Resp 18   Ht 1.524 m (5')   Wt 58 kg (127 lb 13.9 oz)   SpO2 96%   BMI 24.97 kg/m²   Patient is awake and alert, sitting up in bedside chair. No family at bedside  Orientation: limited due to aphasia  Mood: within normal limits  Affect: calm  General appearance: Patient is well nourished, well developed, well groomed and in no acute distress     Memory: Unable to assess given aphasia  Attention/Concentration: Unable to assess given aphasia  Language: Mixed expressive> receptive aphasia. Able to name 1/2 objects, impaired repetition      Cranial Nerves: Some mild right-sided facial asymmetry.  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  Motor Exam: RUE: 3+/5 EF, EE, finger flexion                       LUE: 4/5 EF, EE, finger flexion                       RLE: 4-/5 KE, 4/5 ankle DF          120 mg daily  6/12: Cardizem dose increased to 180mg by Dr. Rouse   HLD: Continue atorvastatin 80 mg nightly  GERD: Continue famotidine 20 mg daily  Macular degeneration: Right eye blindness at baseline, continue latanoprost drops  HFpEF: Continue metoprolol 75 mg twice daily  Prophylaxis:  DVT: Lovenox.  GI: Famotidine.  Pain: As needed Tylenol  Nutrition:  Consultation to dietician for nutritional counseling and recommendations.  Prealbumin will be checked on admission.  Continue daily multivitamin  Bladder: Will monitor for signs and symptoms of infection/retention  Bowel: Continue docusate 100 mg daily, MiraLAX daily, and senna nightly. PRN MOM and enemeez  Follow-ups: PCP (1-2 weeks), general neurology-Dr. Wei (2-4 weeks), Neurointervention- Dr. Floyd (1 month), PM&R- Tramaine (6-8 weeks)  Labs: none   and case management consultations for coordination of care and discharge planning. Team conference held today with anticipated DC home on 6/14/2025 with  services      Missed Therapy Time:  None    Pushpa Redd DO

## 2025-06-11 NOTE — PROGRESS NOTES
[facility]  INPATIENT SPEECH THERAPY  MH-STRZ 8K IP REHAB  PROGRESS NOTE    Discharge Recommendations: Outpatient Speech Therapy  DIET ORDER RECOMMENDATIONS: Regular diet, thin liquids  STRATEGIES: Full Upright Position, Small Bite/Sip, and Alternate Solids and Liquids     SLP Individual Minutes  Time In: 1100  Time Out: 1130  Minutes: 30  Timed Code Treatment Minutes: 0 Minutes     Dysphagia Tx: 0 minutes  Speech-Language Tx: 30 minutes     Date: 2025  Patient Name: Lulu Werner      CSN: [id number]   : 1935  (89 y.o.)  Gender: female   Referring Physician:  Pushpa Redd DO   Diagnosis: Acute cerebrovascular accident (CVA) (HCC)  Precautions: Fall risk   Current Diet: Regular diet, thin liquids   Respiratory Status: Room Air  Date of Last MBS/FEES: Not Applicable    Pain:  None reported     Subjective:   Patient attempting to figure out how to manage TV remote upon SLP arrival. Poor comprehension evident for navigating channel list, as well as navigating the TV remote.     Short-Term Goals:  SHORT TERM GOAL #1:  Goal 1: Patient will consume regular solids/thin liquids with stable pulmonary status and incorporation of trained compensatory strategies to assist with nutrition/hydration measures GOAL MET- CONTINUE FOR CONSISTENCY   INTERVENTIONS:  AM SESSION: ST present for morning medication administration in conjunction with nursing. Patient consuming pills one at a time with sips of thin liquid. Subtle throat clear noted x2, otherwise no further s/s of airway invasion. Recommend continuation of consumption of medications whole, one at a time, with sips of water.     WBC   Date Value Ref Range Status   2025 5.1 4.8 - 10.8 thou/mm3 Final       Right Upper Lobe: Clear  Right Middle Lobe: Clear  Right Lower Lobe: Clear  Left Upper Lobe: Clear  Left Lower Lobe: Clear    SHORT TERM GOAL #2:  Goal 2: Considerations for an instrumental evaluation should adverse changes be conveyed in

## 2025-06-11 NOTE — PROGRESS NOTES
Patient: Lulu Werner  Unit/Bed: 8K-05/005-A  YOB: 1935  MRN: [id number] Acct: [id number]   Admitting Diagnosis: Stroke (cerebrum) (HCC) [I63.9]  Acute cerebrovascular accident (CVA) (HCC) [I63.9]  Admit Date:  6/3/2025  Hospital Day: 8    Assessment:     Principal Problem:    Acute cerebrovascular accident (CVA) (HCC)  Resolved Problems:    * No resolved hospital problems. *      Plan:     Increase the diltiazem due to the elevated BP        Subjective:     Patient has no complaint of CP or SOB..   Medication side effects: none    Scheduled Meds:   [START ON 6/12/2025] dilTIAZem  180 mg Oral Daily    metoprolol tartrate  100 mg Oral BID    famotidine  20 mg Oral Daily    multivitamin  1 tablet Oral Daily    fish oil  1 capsule Oral Daily    potassium chloride  10 mEq Oral Daily    triamcinolone   Topical BID    latanoprost  1 drop Both Eyes Nightly    atorvastatin  80 mg Oral Nightly    clopidogrel  75 mg Oral Daily    aspirin  325 mg Oral Daily    enoxaparin  30 mg SubCUTAneous Daily    senna  1 tablet Oral Nightly    docusate sodium  100 mg Oral Daily    polyethylene glycol  17 g Oral Daily    [START ON 6/21/2025] aspirin  81 mg Oral Daily     Continuous Infusions:   sodium chloride       PRN Meds:sodium chloride flush, sodium chloride, melatonin, ondansetron, acetaminophen, magnesium hydroxide, docusate sodium    Review of Systems  Pertinent items are noted in HPI.    Objective:     Patient Vitals for the past 8 hrs:   BP Temp Temp src Pulse Resp SpO2   06/11/25 0745 (!) 141/87 98 °F (36.7 °C) Oral 72 16 100 %     I/O last 3 completed shifts:  In: 790 [P.O.:790]  Out: -   I/O this shift:  In: 200 [P.O.:200]  Out: -     BP (!) 141/87 Comment: will be giving AM blood prssure meds  Pulse 72   Temp 98 °F (36.7 °C) (Oral)   Resp 16   Ht 1.524 m (5')   Wt 58 kg (127 lb 13.9 oz)   SpO2 100%   BMI 24.97 kg/m²     General appearance: alert, appears stated age, and cooperative  Head:  Influenza Vaccination/Rivaroxaban/Xarelto

## 2025-06-11 NOTE — PROGRESS NOTES
[facility]  INPATIENT PHYSICAL THERAPY  PROGRESS NOTE  MH-STRZ 8K IP REHAB - 8K--A      Discharge Recommendations: Patient would benefit from continued PT at discharge  Equipment Recommendations:Equipment Needed:  (Continue to assess for equipment needs)      Time In: 1000  Time Out: 1100  Timed Code Treatment Minutes: 60 Minutes  Minutes: 60          Date: 2025  Patient Name: Lulu Werner,  Gender:  female        MRN: [id number]  : 1935  (89 y.o.)     Referring Practitioner: Pushpa Redd DO  Diagnosis: Acute cerebrovascular accident (CVA) (HCC)  Additional Pertinent Hx: Per EMR: \"Acute cerebrovascular accident (CVA) (HCC)  Additional Pertinent Hx: Lulu Collins whom presented to the ED from [facility] with 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  MRI indicated interval deteiroation since previous study with new areas of restricted diffusion in L subinsular cortex, L parietal subcortical white matter and R frontal subcortical white mater.  It is noted repeat MRI indicated L hemispheric CVAs secondary to the L carotid occlusion.  Hospital course complicated by a-fib RVR, UTI, edema in RUE as superficial thrombophlebitis present. See Dr. Redd H&P for additional informtion. Pt admitted to the IPR unit on 6/3/25 for further medical care and referred to skilled PT services at this time.\"     Prior Level of Function:  Lives With: Alone  Type of Home: House  Home Layout: Two level, Performs ADL's on one level, Able to Live on Main level with bedroom/bathroom  Home Access: Stairs to enter with rails  Entrance Stairs - Number of Steps: 3 CATHERINE at front and garage entrance  Entrance Stairs - Rails: Right  Home Equipment: Cane, Walker - Rolling   Bathroom Shower/Tub: Walk-in shower  Bathroom Toilet: Standard  Bathroom Equipment: None  Bathroom Accessibility: Accessible    Prior Level of Assist for ADLs: Independent  Prior

## 2025-06-11 NOTE — PROGRESS NOTES
[facility]  INPATIENT SPEECH THERAPY  MH-STRZ 8K IP REHAB  DAILY NOTE    Discharge Recommendations: Outpatient Speech Therapy  DIET ORDER RECOMMENDATIONS: Regular diet, thin liquids  STRATEGIES: Full Upright Position, Small Bite/Sip, and Alternate Solids and Liquids     SLP Individual Minutes  Time In: 0800  Time Out: 0830  Minutes: 30  Timed Code Treatment Minutes: 0 Minutes     Dysphagia Tx: 8 minutes  Speech-Language Tx: 22 minutes     Date: 2025  Patient Name: Lulu Werner      CSN: [id number]   : 1935  (89 y.o.)  Gender: female   Referring Physician:  Pushpa Redd DO   Diagnosis: Acute cerebrovascular accident (CVA) ([facility])  Precautions: Fall risk   Current Diet: Regular diet, thin liquids   Respiratory Status: Room Air  Date of Last MBS/FEES: Not Applicable    Pain:  None reported     Subjective:   Upon arrival, patient sitting in chair, alert and cooperative. Ongoing expressive and receptive aphasia evident resulting in elevated frustration levels. Difficulty with comprehension of instructions for task completion. No family present.     Short-Term Goals:  SHORT TERM GOAL #1:  Goal 1: Patient will consume regular solids/thin liquids with stable pulmonary status and incorporation of trained compensatory strategies to assist with nutrition/hydration measures  INTERVENTIONS:  ST present for morning medication administration in conjunction with nursing. Patient consuming pills one at a time with sips of thin liquid. Subtle throat clear noted x2, otherwise no further s/s of airway invasion. Recommend continuation of consumption of medications whole, one at a time, with sips of water.     WBC   Date Value Ref Range Status   2025 5.1 4.8 - 10.8 thou/mm3 Final       Right Upper Lobe: Clear  Right Middle Lobe: Clear  Right Lower Lobe: Clear  Left Upper Lobe: Clear  Left Lower Lobe: Clear    SHORT TERM GOAL #2:  Goal 2: Considerations for an instrumental evaluation should adverse

## 2025-06-12 PROCEDURE — 92507 TX SP LANG VOICE COMM INDIV: CPT | Performed by: SPEECH-LANGUAGE PATHOLOGIST

## 2025-06-12 PROCEDURE — 99232 SBSQ HOSP IP/OBS MODERATE 35: CPT | Performed by: STUDENT IN AN ORGANIZED HEALTH CARE EDUCATION/TRAINING PROGRAM

## 2025-06-12 PROCEDURE — 97129 THER IVNTJ 1ST 15 MIN: CPT

## 2025-06-12 PROCEDURE — 97112 NEUROMUSCULAR REEDUCATION: CPT

## 2025-06-12 PROCEDURE — 6370000000 HC RX 637 (ALT 250 FOR IP): Performed by: STUDENT IN AN ORGANIZED HEALTH CARE EDUCATION/TRAINING PROGRAM

## 2025-06-12 PROCEDURE — 97116 GAIT TRAINING THERAPY: CPT

## 2025-06-12 PROCEDURE — 6360000002 HC RX W HCPCS: Performed by: STUDENT IN AN ORGANIZED HEALTH CARE EDUCATION/TRAINING PROGRAM

## 2025-06-12 PROCEDURE — 97129 THER IVNTJ 1ST 15 MIN: CPT | Performed by: SPEECH-LANGUAGE PATHOLOGIST

## 2025-06-12 PROCEDURE — 97130 THER IVNTJ EA ADDL 15 MIN: CPT | Performed by: SPEECH-LANGUAGE PATHOLOGIST

## 2025-06-12 PROCEDURE — 97530 THERAPEUTIC ACTIVITIES: CPT

## 2025-06-12 PROCEDURE — 97110 THERAPEUTIC EXERCISES: CPT

## 2025-06-12 PROCEDURE — 1180000000 HC REHAB R&B

## 2025-06-12 PROCEDURE — 97535 SELF CARE MNGMENT TRAINING: CPT

## 2025-06-12 PROCEDURE — 6370000000 HC RX 637 (ALT 250 FOR IP): Performed by: FAMILY MEDICINE

## 2025-06-12 RX ADMIN — Medication 1000 MG: at 10:04

## 2025-06-12 RX ADMIN — LATANOPROST 1 DROP: 50 SOLUTION OPHTHALMIC at 21:49

## 2025-06-12 RX ADMIN — POTASSIUM CHLORIDE 10 MEQ: 1500 TABLET, EXTENDED RELEASE ORAL at 10:03

## 2025-06-12 RX ADMIN — ENOXAPARIN SODIUM 30 MG: 100 INJECTION SUBCUTANEOUS at 13:34

## 2025-06-12 RX ADMIN — ASPIRIN 325 MG: 325 TABLET, COATED ORAL at 10:03

## 2025-06-12 RX ADMIN — DILTIAZEM HYDROCHLORIDE 180 MG: 180 CAPSULE, EXTENDED RELEASE ORAL at 10:04

## 2025-06-12 RX ADMIN — METOPROLOL 100 MG: 100 TABLET ORAL at 21:49

## 2025-06-12 RX ADMIN — ATORVASTATIN CALCIUM 80 MG: 80 TABLET, FILM COATED ORAL at 21:49

## 2025-06-12 RX ADMIN — METOPROLOL 100 MG: 100 TABLET ORAL at 10:03

## 2025-06-12 RX ADMIN — FAMOTIDINE 20 MG: 20 TABLET, FILM COATED ORAL at 10:04

## 2025-06-12 RX ADMIN — Medication 1 TABLET: at 10:04

## 2025-06-12 RX ADMIN — CLOPIDOGREL BISULFATE 75 MG: 75 TABLET, FILM COATED ORAL at 10:04

## 2025-06-12 RX ADMIN — TRIAMCINOLONE ACETONIDE: 1 CREAM TOPICAL at 13:34

## 2025-06-12 ASSESSMENT — PAIN SCALES - GENERAL: PAINLEVEL_OUTOF10: 0

## 2025-06-12 ASSESSMENT — PAIN SCALES - WONG BAKER: WONGBAKER_NUMERICALRESPONSE: NO HURT

## 2025-06-12 NOTE — CARE COORDINATION
Patient stated felling weird and demonstrated anxious behavior. Pt denies dizziness and pain. Patient started to get more disoriented at 1640 after ambulating 1 assit from the bathroom. Reoriented to room by showing her picture of her four sons.

## 2025-06-12 NOTE — PLAN OF CARE
Problem: Chronic Conditions and Co-morbidities  Goal: Patient's chronic conditions and co-morbidity symptoms are monitored and maintained or improved  6/12/2025 1339 by Mariela Puga RN  Outcome: Progressing  Flowsheets (Taken 6/12/2025 1339)  Care Plan - Patient's Chronic Conditions and Co-Morbidity Symptoms are Monitored and Maintained or Improved:   Monitor and assess patient's chronic conditions and comorbid symptoms for stability, deterioration, or improvement   Collaborate with multidisciplinary team to address chronic and comorbid conditions and prevent exacerbation or deterioration   Update acute care plan with appropriate goals if chronic or comorbid symptoms are exacerbated and prevent overall improvement and discharge     Problem: Discharge Planning  Goal: Discharge to home or other facility with appropriate resources  6/12/2025 1339 by Mariela Puga RN  Outcome: Progressing  Flowsheets (Taken 6/12/2025 1339)  Discharge to home or other facility with appropriate resources:   Identify barriers to discharge with patient and caregiver   Arrange for needed discharge resources and transportation as appropriate   Identify discharge learning needs (meds, wound care, etc)     Problem: Skin/Tissue Integrity  Goal: Skin integrity remains intact  6/12/2025 1339 by Mariela Puga, RN  Outcome: Progressing  Flowsheets (Taken 6/12/2025 1339)  Skin Integrity Remains Intact:   Monitor for areas of redness and/or skin breakdown   Every 4-6 hours minimum:  Change oxygen saturation probe site   Every 4-6 hours:  If on nasal continuous positive airway pressure, assess nares and determine need for appliance change or resting period

## 2025-06-12 NOTE — PLAN OF CARE
Problem: Discharge Planning  Goal: Discharge to home or other facility with appropriate resources  2025 1555 by Marcia Boyd LISW-S  Note:   [facility]  Physical Medicine and Rehabilitation  Post Team Conference Note    Date: 2025  Patient Name: Lulu Werner  MRN: [id number]  : 1935 (89 y.o.)    IPR Team Conference was held on Thursday, . Recommendations of the team were explained to the patient and family by Dr Redd. Team is recommending that patient continue on acute inpatient rehab for PT, OT and ST, with an expected discharge date of Saturday, . Following discharge, team is recommending outpatient PT, OT and ST. Patient and family would like to pursue outpatient therapy at discharge. Care plan reviewed with patient and family. Patient and family verbalized understanding or the plan of care and contributed to goal setting.     NATHALIA spoke with Eduarda at [facility] on this date. Eduarda to call patient's son, Matthew, on  to schedule evaluations.    NATHALIA spoke with Matthew to confirm referral for therapy. Matthew had no outstanding needs or concerns.    SW to follow and maintain involvement in discharge planning.    Plan  Referrals Needed: No additional referrals needed  Family Training: Thursday,   Driving Recommendations: Driving Evaluations

## 2025-06-12 NOTE — PROGRESS NOTES
[facility]  MH-STRZ 8K IP REHAB  Occupational Therapy  Daily Note    Discharge Recommendations: Home with Outpatient OT and Patient would benefit from continued OT at discharge  Equipment Recommendations:   Continue to address/confirm what equipment pt owns as appropriate/able.       Time In: 0900  Time Out: 09  Timed Code Treatment Minutes: 30 Minutes  Minutes: 30          Date: 2025  Patient Name: Lulu Werner,   Gender: female      Room: [address] [place] [address]  MRN: [id number]  : 1935  (89 y.o.)  Referring Practitioner: Pushpa Redd DO  Diagnosis: Acute cerebrovascular accident (CVA) (HCC)  Additional Pertinent Hx: Lulu Collins whom presented to the ED from [facility] with 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  MRI indicated interval deteiroation since previous study with new areas of restricted diffusion in L subinsular cortex, L parietal subcortical white matter and R frontal subcortical white mater.  It is noted repeat MRI indicated L hemispheric CVAs secondary to the L carotid occlusion.  Hospital course complicated by a-fib RVR, UTI, edema in RUE as superficial thrombophlebitis present. See Dr. Redd H&P for additional informtion. Pt admitted to the IPR unit on 6/3/25 for further medical care and referred to skilled OT services at this time.    Restrictions/Precautions:  Restrictions/Precautions: Fall Risk, General Precautions      Social/Functional History:  Lives With: Alone  Type of Home: House  Home Layout: Two level, Performs ADL's on one level, Able to Live on Main level with bedroom/bathroom  Home Access: Stairs to enter with rails  Entrance Stairs - Number of Steps: 3 CATHERINE at front and garage entrance  Entrance Stairs - Rails: Right  Home Equipment: Cane, Walker - Rolling   Bathroom Shower/Tub: Walk-in shower  Bathroom Toilet: Standard  Bathroom Equipment: None  Bathroom Accessibility: Accessible       Prior  Level of Assist for ADLs: Independent  Prior Level of Assist for Homemaking: Independent  Prior Level of Assist for Transfers: Independent  Prior Level of Assist for Ambulation: Independent household ambulator, with or without device, Independent community ambulator, with or without device  Has the patient had two or more falls in the past year or any fall with injury in the past year?: Yes    Active : Yes  Occupation: Retired  Leisure & Hobbies: None stated  Additional Comments: Pt provided above information, may need to confirm accuracy due to pt demo reduced cognition/poor historian at this time.  Pt reports independence with ADLs/IADLs including money management and medication management, pt reports she does not use or own any AD.  Pt reports she has family whom live nearby.    SUBJECTIVE: Pt in bedside chair upon arrival and agreeable to OT session.     PAIN: None noted    Vitals: Vitals not assessed per clinical judgement, see nursing flowsheet    COGNITION: Decreased Insight, Decreased Safety Awareness, and Expressive Aphasia    ADL:   Feeding: Modified Independent.  As pt finishing breakfast upon arrival, pt drinking magic shake and reports \"this is important, I need to finish this\", which is an improvement in regards to insight.    Grooming: Mod I for hand hygiene post toileting task as no cues required for initiation or safety.  Cues required to engage in oral care this date, pt did not wish to brush teeth but agreeable for mouthwash completion with overall supervision.     Toileting: Mod I for clothing management, mod I gunnar care, no cues required throughout     Toilet Transfer: Mod I as no cues required To/from STS with increased time and use of grab bar.  Pt able to turn and stand to flush toilet as well.     Pt able to open 5/5 containers with increased time with set up assistance with increased difficulty with flip top in comparison to twist top.        IADL:   Pt instructed to ambulate in

## 2025-06-12 NOTE — PROGRESS NOTES
Physical Medicine & Rehabilitation   Progress Note    Chief Complaint:  CVA    Subjective: Patient seen and examined. Patient reports feeling excited about anticipated DC home tomorrow. No reports of any CP or SOB. No reports of any significant changes to bowel or bladder. Last documented BM was on 6/12/02025. She continues to tolerate and progress well with therapies.     Did discuss with patient and family yesterday- plan is for family to stay with patient on DC. They are aware that at this time she need near 24/7 supervision. Additionally, they are looking into life alert, etc to assist with patient's communication      Rehabilitation:  PT 6/12/2025:  Bed Mobility:  Rolling to Left: Supervision   Rolling to Right: Supervision   Supine to Sit: Supervision  Sit to Supine: Supervision   - cues provided to scoot toward center of bed.     Transfers:  Sit to Stand: Supervision  Stand to Sit:Supervision  To/From Bed and Chair: Supervision  Car:Stand By Assistance     Ambulation:  Stand By Assistance  Distance: 300'x1, 150'x1, 100'x2 and multiple shorter distances throughout therapy gym.  Surface: Level Tile  Device: Rolling Walker  Gait Deviations: Forward Flexed Posture, Slow Lindsay, Decreased Step Length Bilaterally, Decreased Gait Speed, Decreased Heel Strike Bilaterally, and Mild Path Deviations. Patient occasionally bumping R side of AD into wall. Patient able to adjust RW to continue mobility throughout hallway.      Stairs:  Stairs: 6\" steps X 4 using Bilateral Handrails and Stand By Assistance.  Platform: 6\" platform X 1 using Rolling Walker and Contact Guard Assistance, with increased time for completion, verbal cues provided for increased safety and proper sequencing of utilizing RW.     Balance:  Dynamic Standing Balance: Contact Guard Assistance, Patient completing dynamic standing task while standing on AirEx foam. Patient reaching for rings outside MAYRA and tossing toward target.  Pt. Able to  an  Resp 18   Ht 1.524 m (5')   Wt 58 kg (127 lb 13.9 oz)   SpO2 97%   BMI 24.97 kg/m²   Patient is awake and alert, sitting up in bedside chair. No family at bedside  Orientation: able to state name, able to identify that she is in the hospital when given options, additionally, unable to identify year (limitation given aphasia)  Mood: within normal limits  Affect: calm  General appearance: Patient is well nourished, well developed, well groomed and in no acute distress     Memory: Unable to assess given aphasia  Attention/Concentration: Unable to assess given aphasia  Language: Mixed expressive> receptive aphasia. Able to name 1/2 objects, impaired repetition      Cranial Nerves: Some mild right-sided facial asymmetry.  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  Motor Exam: RUE: 3+/5 EF, EE, finger flexion                       LUE: 4/5 EF, EE, finger flexion                       RLE: 4-/5 KE, 4/5 ankle DF                        LLE: 4/5 KE, 4/5 ankle DF  Tone:  normal  Muscle bulk: within normal limits  Sensory:  Sensory intact to light touch  Gait: Not assessed     Heart: irregularly irregular, no m/r/g noted   Lungs: CTAB, Breathing comfortably on room air without increased work of breathing  Abdomen: non-distended  Skin: warm and dry, no rash or erythema on exposed skin    Diagnostics:   No results found for this or any previous visit (from the past 24 hours).          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  Rehabilitation services will include PT, OT, and SLP/RT in order to improve functional status

## 2025-06-12 NOTE — PROGRESS NOTES
[facility] [place]  MH-STRZ 8K IP REHAB  Occupational Therapy  Daily Note    Discharge Recommendations: Home with Outpatient OT  Equipment Recommendations:   Continue to address/confirm what equipment pt owns as appropriate/able.    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  MRI indicated interval deteiroation since previous study with new areas of restricted diffusion in L subinsular cortex, L parietal subcortical white matter and R frontal subcortical white mater.  It is noted repeat MRI indicated L hemispheric CVAs secondary to the L carotid occlusion.  Hospital course complicated by a-fib RVR, UTI, edema in RUE as superficial thrombophlebitis present. See Dr. Redd H&P for additional informtion. Pt admitted to the IPR unit on 6/3/25 for further medical care and referred to skilled OT services at this time.    Restrictions/Precautions:  Restrictions/Precautions: Fall Risk, General Precautions      Social/Functional History:  Lives With: Alone  Type of Home: House  Home Layout: Two level, Performs ADL's on one level, Able to Live on Main level with bedroom/bathroom  Home Access: Stairs to enter with rails  Entrance Stairs - Number of Steps: 3 CATHERINE at front and garage entrance  Entrance Stairs - Rails: Right  Home Equipment: Cane, Walker - Rolling   Bathroom Shower/Tub: Walk-in shower  Bathroom Toilet: Standard  Bathroom Equipment: None  Bathroom Accessibility: Accessible       Prior Level of Assist for ADLs: Independent  Prior Level of

## 2025-06-12 NOTE — PROGRESS NOTES
[facility]  INPATIENT PHYSICAL THERAPY  DAILY NOTE  MH-STRZ 8K IP REHAB - 8K--A      Discharge Recommendations: 24 hour assistance or supervision and Patient would benefit from continued PT at discharge  Equipment Recommendations:  (Continue to assess for equipment needs)               Time In: 1105  Time Out: 1205  Timed Code Treatment Minutes: 60 Minutes  Minutes: 60          Date: 2025  Patient Name: Lulu Werner,  Gender:  female        MRN: [id number]  : 1935  (89 y.o.)     Referring Practitioner: Pushpa Redd DO  Diagnosis: Acute cerebrovascular accident (CVA) (HCC)  Additional Pertinent Hx: Per EMR: \"Acute cerebrovascular accident (CVA) (HCC)  Additional Pertinent Hx: Lulu Collins whom presented to the ED from [facility] with 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  MRI indicated interval deteiroation since previous study with new areas of restricted diffusion in L subinsular cortex, L parietal subcortical white matter and R frontal subcortical white mater.  It is noted repeat MRI indicated L hemispheric CVAs secondary to the L carotid occlusion.  Hospital course complicated by a-fib RVR, UTI, edema in RUE as superficial thrombophlebitis present. See Dr. Redd H&P for additional informtion. Pt admitted to the IPR unit on 6/3/25 for further medical care and referred to skilled PT services at this time.\"     Prior Level of Function:  Lives With: Alone  Type of Home: House  Home Layout: Two level, Performs ADL's on one level, Able to Live on Main level with bedroom/bathroom  Home Access: Stairs to enter with rails  Entrance Stairs - Number of Steps: 3 CATHERINE at front and garage entrance  Entrance Stairs - Rails: Right  Home Equipment: Cane, Walker - Rolling   Bathroom Shower/Tub: Walk-in shower  Bathroom Toilet: Standard  Bathroom Equipment: None  Bathroom Accessibility: Accessible    Prior Level of Assist  200ft independently with use of RW in order to ambulate safely in the community  Additional Goals?: Yes  Long term goal 6: Pt will be able to ascend/descend 3 stairs independently and with use of single handrail in order to enter/exit the home safely  Long term goal 7: Pt will improve score of Tinetti Balance assessment by 6 points in order to meet EVETTE and decrease fall risk    Following session, patient left in safe position in bed, with alarm, and call light within reach

## 2025-06-12 NOTE — PROGRESS NOTES
[facility]  INPATIENT SPEECH THERAPY  MH-STRZ 8K IP REHAB  DAILY NOTE    Discharge Recommendations: Outpatient Speech Therapy  DIET ORDER RECOMMENDATIONS: Regular diet, thin liquids  STRATEGIES: Full Upright Position, Small Bite/Sip, and Alternate Solids and Liquids     SLP Individual Minutes  Time In: 0730  Time Out: 0800  Minutes: 30  Timed Code Treatment Minutes: 30 Minutes     Dysphagia Tx: 0 minutes  Speech-Language Tx: 0 minutes   Cognitive Tx: 30 minutes    Date: 2025  Patient Name: Lulu Werner      CSN: [id number]   : 1935  (89 y.o.)  Gender: female   Referring Physician:  Pushpa Redd DO   Diagnosis: Acute cerebrovascular accident (CVA) (HCC)  Precautions: Fall risk   Current Diet: Regular diet, thin liquids   Respiratory Status: Room Air  Date of Last MBS/FEES: Not Applicable    Pain:  None reported     Subjective:   Upon arrival, patient positioned upright, pleasant and cooperative. No family present.     Short-Term Goals:  SHORT TERM GOAL #1:  Goal 1: Patient will consume regular solids/thin liquids with stable pulmonary status and incorporation of trained compensatory strategies to assist with nutrition/hydration measures   INTERVENTIONS:  Did not address due to focus on other goals.     WBC   Date Value Ref Range Status   2025 5.1 4.8 - 10.8 thou/mm3 Final       Right Upper Lobe: Clear  Right Middle Lobe: Clear  Right Lower Lobe: Clear  Left Upper Lobe: Clear  Left Lower Lobe: Clear    SHORT TERM GOAL #2:  Goal 2: Considerations for an instrumental evaluation should adverse changes be conveyed in direct relation to the safety/efficency of the swallow mechanism.   INTERVENTIONS: Not clinically indicated at this time.     SHORT TERM GOAL #3:  Goal 3: Patient will complete verbal expression tasks (including BASIC naming, automatic speech tasks, biographics, picture description, verbal fluency, word/phrase level repetition) with 80% accuracy, max cues to improve  functional expressive communication.  INTERVENTIONS: Did not address due to focus on other goals.     SHORT TERM GOAL #4:  Goal 4: Patient will complete auditory/reading comprehension (including direction following of 1-2 step commands, BASIC yes/questions, functional reading comprehension, Object ID froma FO3) with 70% accuracy, mod cues to improve functional recepetive communication.   INTERVENTIONS: Did not address due to focus on other goals.     SHORT TERM GOAL #5:  Goal 5: Patient will complete written expressive tasks (ie: biographics, common words/phrases) with 70% accuracy, max cues to improve written communication.  INTERVENTIONS: Did not address due to focus on other goals.     SHORT TERM GOAL #6:  Goal 6: Patient will complete functional carryover tasks (o-log, biographics, call light) with 50% accuracy with max cues to improve awareness/participation in current environment and complete formal cognitive screener as clinically indicated.   INTERVENTIONS:   Orientation: Presented as auditory questions with FO2 written choices.  -City: Incorrect  -Location: Correct selection from FO2  -Hospital: Max cues to use environmental cues, but still unable to determine correct response  -Month: Correct selection from F02  -Date: Correct selection from F02  - Day of the week: Incorrect  -Year: Correct selection from FO2  -Time of day: Unable to correctly determine  -Etiology: Correct selection from FO2  -Deficits: Able to select from written list    Biographics:   -Home- Independent for \"[place]\" and that she was from \"[place]\". Also able to state that she moved to [place] because of her 's job.  -Naming kids: Unable independently, but with provision of picture of children and written list of names, patient able to identify son and provide name.     Problem solving- Given pictured scene, patient prompted to verbalize problem and a solution  -Identify problem: 2/2 with max cues  *Structured sentence support for

## 2025-06-12 NOTE — PLAN OF CARE
Problem: Chronic Conditions and Co-morbidities  Goal: Patient's chronic conditions and co-morbidity symptoms are monitored and maintained or improved  Outcome: Progressing     Problem: Discharge Planning  Goal: Discharge to home or other facility with appropriate resources  Outcome: Progressing     Problem: Skin/Tissue Integrity  Goal: Skin integrity remains intact  Description: 1.  Monitor for areas of redness and/or skin breakdown2.  Assess vascular access sites hourly3.  Every 4-6 hours minimum:  Change oxygen saturation probe site4.  Every 4-6 hours:  If on nasal continuous positive airway pressure, respiratory therapy assess nares and determine need for appliance change or resting period  Outcome: Progressing     Problem: Safety - Adult  Goal: Free from fall injury  Outcome: Progressing     Problem: ABCDS Injury Assessment  Goal: Absence of physical injury  Outcome: Progressing     Problem: Pain  Goal: Verbalizes/displays adequate comfort level or baseline comfort level  Outcome: Progressing     Problem: Nutrition Deficit:  Goal: Optimize nutritional status  Outcome: Progressing

## 2025-06-12 NOTE — PROGRESS NOTES
[facility]  INPATIENT SPEECH THERAPY  MH-STRZ 8K IP REHAB  DAILY NOTE    Discharge Recommendations: Outpatient Speech Therapy  DIET ORDER RECOMMENDATIONS: Regular diet, thin liquids  STRATEGIES: Full Upright Position, Small Bite/Sip, and Alternate Solids and Liquids     SLP Individual Minutes  Time In: 1030  Time Out: 1100  Minutes: 30  Timed Code Treatment Minutes: 0 Minutes     Dysphagia Tx: 0 minutes  Speech-Language Tx: 30 minutes   Cognitive Tx: 0 minutes    Date: 2025  Patient Name: Lulu Werner      CSN: [id number]   : 1935  (89 y.o.)  Gender: female   Referring Physician:  Pushpa Redd DO   Diagnosis: Acute cerebrovascular accident (CVA) (HCC)  Precautions: Fall risk   Current Diet: Regular diet, thin liquids   Respiratory Status: Room Air  Date of Last MBS/FEES: Not Applicable    Pain:  None reported     Subjective:   Patient seen following OT session in private treatment room. Completed family education with 3 sons and a daughter in law. Patient very quiet throughout family education. SLP attempted multiple times to include patient in conversation with patient stating \"whatever you say.\" Noted increased agitation levels with patient as therapist provided stroke education and discussion regarding impairments to family. Reviewed the following:    Family Education:  *Provided education on meaning and examples of expressive and receptive aphasia, discussing patient's relative strengths with fluent reading, but overall poor comprehension of both written and auditory information.   *Provided written handout outlining word-finding strategies to implement when patient is having difficulty coming up with a word.   *Educated family on patient tendencies for perseveration and what to do in theses instances.   *Discussed that patient will require direct assistance with cooking, medication management, finances and community navigation. Patient not appropriate for return to driving.    *Family insightful, making correlations to their own observations and inquiring about how they can best help her and facilitate further recovery.   *Discussed with family need to provide support and encouragement to reduced frustration.     Short-Term Goals:  SHORT TERM GOAL #1:  Goal 1: Patient will consume regular solids/thin liquids with stable pulmonary status and incorporation of trained compensatory strategies to assist with nutrition/hydration measures   INTERVENTIONS:  Did not address due to focus on other goals.     WBC   Date Value Ref Range Status   06/06/2025 5.1 4.8 - 10.8 thou/mm3 Final       Right Upper Lobe: Clear  Right Middle Lobe: Clear  Right Lower Lobe: Clear  Left Upper Lobe: Clear  Left Lower Lobe: Clear    SHORT TERM GOAL #2:  Goal 2: Considerations for an instrumental evaluation should adverse changes be conveyed in direct relation to the safety/efficency of the swallow mechanism.   INTERVENTIONS: Not clinically indicated at this time.     SHORT TERM GOAL #3:  Goal 3: Patient will complete verbal expression tasks (including BASIC naming, automatic speech tasks, biographics, picture description, verbal fluency, word/phrase level repetition) with 80% accuracy, max cues to improve functional expressive communication.  INTERVENTIONS: Did not address due to focus on other goals.     SHORT TERM GOAL #4:  Goal 4: Patient will complete auditory/reading comprehension (including direction following of 1-2 step commands, BASIC yes/questions, functional reading comprehension, Object ID froma FO3) with 70% accuracy, mod cues to improve functional recepetive communication.   INTERVENTIONS:   Written directions: 1 step, 2 components   -Reading directions: 3/3 indep  -Determining target word from a list of 4 options: 0/2 despite max cues  *POOR comprehension, not able to pick out a color word or fruit.   *Family present to see severity of breakdowns, which appeared to further irritate patient.

## 2025-06-12 NOTE — PROGRESS NOTES
Prayer and encouragement    06/12/25 1604   Encounter Summary   Encounter Overview/Reason  Encounter   Service Provided For Patient   Referral/Consult From [facility] [place]   Support System Children   Last Encounter  06/12/25   Complexity of Encounter Low   Begin Time 1130   End Time  1136   Total Time Calculated 6 min   Spiritual/Emotional needs   Type Spiritual Support   Assessment/Intervention/Outcome   Assessment Coping   Intervention Active listening

## 2025-06-13 ENCOUNTER — APPOINTMENT (OUTPATIENT)
Dept: INTERVENTIONAL RADIOLOGY/VASCULAR | Age: 89
End: 2025-06-13
Attending: STUDENT IN AN ORGANIZED HEALTH CARE EDUCATION/TRAINING PROGRAM
Payer: MEDICARE

## 2025-06-13 LAB — ECHO BSA: 1.53 M2

## 2025-06-13 PROCEDURE — 1180000000 HC REHAB R&B

## 2025-06-13 PROCEDURE — 6370000000 HC RX 637 (ALT 250 FOR IP): Performed by: STUDENT IN AN ORGANIZED HEALTH CARE EDUCATION/TRAINING PROGRAM

## 2025-06-13 PROCEDURE — 93925 LOWER EXTREMITY STUDY: CPT

## 2025-06-13 PROCEDURE — 6360000002 HC RX W HCPCS: Performed by: STUDENT IN AN ORGANIZED HEALTH CARE EDUCATION/TRAINING PROGRAM

## 2025-06-13 PROCEDURE — 97530 THERAPEUTIC ACTIVITIES: CPT

## 2025-06-13 PROCEDURE — 6370000000 HC RX 637 (ALT 250 FOR IP): Performed by: FAMILY MEDICINE

## 2025-06-13 PROCEDURE — 97116 GAIT TRAINING THERAPY: CPT

## 2025-06-13 PROCEDURE — 92526 ORAL FUNCTION THERAPY: CPT | Performed by: SPEECH-LANGUAGE PATHOLOGIST

## 2025-06-13 PROCEDURE — 92507 TX SP LANG VOICE COMM INDIV: CPT | Performed by: SPEECH-LANGUAGE PATHOLOGIST

## 2025-06-13 PROCEDURE — 97535 SELF CARE MNGMENT TRAINING: CPT

## 2025-06-13 PROCEDURE — 99239 HOSP IP/OBS DSCHRG MGMT >30: CPT | Performed by: STUDENT IN AN ORGANIZED HEALTH CARE EDUCATION/TRAINING PROGRAM

## 2025-06-13 RX ORDER — CLOPIDOGREL BISULFATE 75 MG/1
75 TABLET ORAL DAILY
Qty: 30 TABLET | Refills: 1 | Status: SHIPPED | OUTPATIENT
Start: 2025-06-14

## 2025-06-13 RX ORDER — ATORVASTATIN CALCIUM 80 MG/1
80 TABLET, FILM COATED ORAL NIGHTLY
Qty: 30 TABLET | Refills: 1 | Status: SHIPPED | OUTPATIENT
Start: 2025-06-13

## 2025-06-13 RX ORDER — POLYETHYLENE GLYCOL 3350 17 G/17G
17 POWDER, FOR SOLUTION ORAL DAILY
COMMUNITY
Start: 2025-06-14 | End: 2025-07-14

## 2025-06-13 RX ORDER — METOPROLOL TARTRATE 100 MG/1
100 TABLET ORAL 2 TIMES DAILY
Qty: 60 TABLET | Refills: 1 | Status: SHIPPED | OUTPATIENT
Start: 2025-06-13

## 2025-06-13 RX ORDER — ASPIRIN 81 MG/1
81 TABLET, CHEWABLE ORAL DAILY
Qty: 30 TABLET | Refills: 1 | Status: SHIPPED | OUTPATIENT
Start: 2025-06-18

## 2025-06-13 RX ORDER — SENNOSIDES 8.6 MG
325 CAPSULE ORAL DAILY
Qty: 3 TABLET | Refills: 0 | Status: SHIPPED | OUTPATIENT
Start: 2025-06-15 | End: 2025-06-18

## 2025-06-13 RX ORDER — DILTIAZEM HYDROCHLORIDE 180 MG/1
180 CAPSULE, COATED, EXTENDED RELEASE ORAL DAILY
Qty: 30 CAPSULE | Refills: 1 | Status: SHIPPED | OUTPATIENT
Start: 2025-06-14

## 2025-06-13 RX ADMIN — LATANOPROST 1 DROP: 50 SOLUTION OPHTHALMIC at 21:57

## 2025-06-13 RX ADMIN — METOPROLOL 100 MG: 100 TABLET ORAL at 10:15

## 2025-06-13 RX ADMIN — ATORVASTATIN CALCIUM 80 MG: 80 TABLET, FILM COATED ORAL at 21:53

## 2025-06-13 RX ADMIN — POTASSIUM CHLORIDE 10 MEQ: 1500 TABLET, EXTENDED RELEASE ORAL at 10:16

## 2025-06-13 RX ADMIN — DILTIAZEM HYDROCHLORIDE 180 MG: 180 CAPSULE, EXTENDED RELEASE ORAL at 10:15

## 2025-06-13 RX ADMIN — CLOPIDOGREL BISULFATE 75 MG: 75 TABLET, FILM COATED ORAL at 10:15

## 2025-06-13 RX ADMIN — METOPROLOL 100 MG: 100 TABLET ORAL at 21:53

## 2025-06-13 RX ADMIN — ENOXAPARIN SODIUM 30 MG: 100 INJECTION SUBCUTANEOUS at 10:16

## 2025-06-13 RX ADMIN — Medication 1 TABLET: at 10:15

## 2025-06-13 RX ADMIN — Medication 1000 MG: at 10:15

## 2025-06-13 RX ADMIN — FAMOTIDINE 20 MG: 20 TABLET, FILM COATED ORAL at 10:15

## 2025-06-13 RX ADMIN — ASPIRIN 325 MG: 325 TABLET, COATED ORAL at 10:15

## 2025-06-13 ASSESSMENT — PAIN SCALES - GENERAL
PAINLEVEL_OUTOF10: 0
PAINLEVEL_OUTOF10: 0

## 2025-06-13 NOTE — PROGRESS NOTES
Patient: Lulu Werner  Unit/Bed: 8K-05/005-A  YOB: 1935  MRN: [id number] Acct: [id number]   Admitting Diagnosis: Stroke (cerebrum) (HCC) [I63.9]  Acute cerebrovascular accident (CVA) (HCC) [I63.9]  Admit Date:  6/3/2025  Hospital Day: 9    Assessment:     Principal Problem:    Acute cerebrovascular accident (CVA) (HCC)  Resolved Problems:    * No resolved hospital problems. *      Plan:     Possible need to increase the diltiazem tomorrow as the BP continues up.        Subjective:     Patient has no complaint of CP or SOB..   Medication side effects: none    Scheduled Meds:   dilTIAZem  180 mg Oral Daily    metoprolol tartrate  100 mg Oral BID    famotidine  20 mg Oral Daily    multivitamin  1 tablet Oral Daily    fish oil  1 capsule Oral Daily    potassium chloride  10 mEq Oral Daily    triamcinolone   Topical BID    latanoprost  1 drop Both Eyes Nightly    atorvastatin  80 mg Oral Nightly    clopidogrel  75 mg Oral Daily    aspirin  325 mg Oral Daily    enoxaparin  30 mg SubCUTAneous Daily    senna  1 tablet Oral Nightly    docusate sodium  100 mg Oral Daily    polyethylene glycol  17 g Oral Daily    [START ON 6/21/2025] aspirin  81 mg Oral Daily     Continuous Infusions:   sodium chloride       PRN Meds:sodium chloride flush, sodium chloride, melatonin, ondansetron, acetaminophen, magnesium hydroxide, docusate sodium    Review of Systems  Pertinent items are noted in HPI.    Objective:     No data found.  I/O last 3 completed shifts:  In: 1358 [P.O.:1358]  Out: -   No intake/output data recorded.    /77   Pulse 88   Temp 97.6 °F (36.4 °C) (Oral)   Resp 18   Ht 1.524 m (5')   Wt 58 kg (127 lb 13.9 oz)   SpO2 96%   BMI 24.97 kg/m²     General appearance: alert, appears stated age, and cooperative  Head: Normocephalic, without obvious abnormality, atraumatic  Lungs: clear to auscultation bilaterally  Heart: regular rate and rhythm, S1, S2 normal, no murmur, click, rub or  gallop  Abdomen: soft, non-tender; bowel sounds normal; no masses,  no organomegaly  Extremities: extremities normal, atraumatic, no cyanosis or edema     Electronically signed by Rene Rouse MD on 6/12/2025 at 8:35 PM

## 2025-06-13 NOTE — PROGRESS NOTES
[facility]  INPATIENT PHYSICAL THERAPY  DAILY NOTE  MH-STRZ 8K IP REHAB - 8K--A      Discharge Recommendations: 24 hour assistance or supervision and Home with Home Health PT  Equipment Recommendations:  (Continue to assess for equipment needs)               Time In: 0900  Time Out: 1000  Timed Code Treatment Minutes: 60 Minutes  Minutes: 60          Date: 2025  Patient Name: Lulu Werner,  Gender:  female        MRN: [id number]  : 1935  (89 y.o.)     Referring Practitioner: Pushpa Redd DO  Diagnosis: Acute cerebrovascular accident (CVA) (HCC)  Additional Pertinent Hx: Per EMR: \"Acute cerebrovascular accident (CVA) (HCC)  Additional Pertinent Hx: Lulu Collins whom presented to the ED from [facility] via [facility] with 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  MRI indicated interval deteiroation since previous study with new areas of restricted diffusion in L subinsular cortex, L parietal subcortical white matter and R frontal subcortical white mater.  It is noted repeat MRI indicated L hemispheric CVAs secondary to the L carotid occlusion.  Hospital course complicated by a-fib RVR, UTI, edema in RUE as superficial thrombophlebitis present. See Dr. Redd H&P for additional informtion. Pt admitted to the IPR unit on 6/3/25 for further medical care and referred to skilled PT services at this time.\"     Prior Level of Function:  Lives With: Alone  Type of Home: House  Home Layout: Two level, Performs ADL's on one level, Able to Live on Main level with bedroom/bathroom  Home Access: Stairs to enter with rails  Entrance Stairs - Number of Steps: 3 CATHERINE at front and garage entrance  Entrance Stairs - Rails: Right  Home Equipment: Cane, Walker - Rolling   Bathroom Shower/Tub: Walk-in shower  Bathroom Toilet: Standard  Bathroom Equipment: None  Bathroom Accessibility: Accessible    Prior Level of Assist for ADLs: Independent  Prior  supervision in order to ambulate household distances safely  Short Term Goal 5: Pt will be able to ascend/descend 3 stairs with use of amadeo handrail and no more than supervision in order to enter/exit the home safely  Long Term Goals  Time Frame for Long Term Goals : 3 weeks  Long Term Goal 1: Pt will perform supine<> sit transfer independently without use of bedrails in order to get into/out of bed at home  Long Term Goal 2: Pt will perform sit<>stand transfer independently with use of RW to be able to transfer to/from various surfaces  Long Term Goal 3: Pt will be able to perform transfer in<>out of car independently with use of RW in order to meet transportation needs  Long Term Goal 4: Pt will be able to ambulate 35ft independently with use of RW in order to ambulate household distances safely  Long Term Goal 5: Pt will be able to ambulate 200ft independently with use of RW in order to ambulate safely in the community  Additional Goals?: Yes  Long term goal 6: Pt will be able to ascend/descend 3 stairs independently and with use of single handrail in order to enter/exit the home safely  Long term goal 7: Pt will improve score of Tinetti Balance assessment by 6 points in order to meet EVETTE and decrease fall risk    Following session, patient left in safe position in chair, with alarm, and call light within reach

## 2025-06-13 NOTE — PROGRESS NOTES
[facility]  INPATIENT SPEECH THERAPY  MH-STRZ 8K IP REHAB  DAILY NOTE    Discharge Recommendations: Outpatient Speech Therapy; Will need 24/7 supervision/assistance at discharge  DIET ORDER RECOMMENDATIONS: Regular diet, thin liquids  STRATEGIES: Full Upright Position, Small Bite/Sip, and Alternate Solids and Liquids     SLP Individual Minutes  Time In: 1230  Time Out: 1300  Minutes: 30  Timed Code Treatment Minutes: 0 Minutes     Dysphagia Tx: 20 minutes  Speech-Language Tx: 10 minutes   Cognitive Tx: 0 minutes    Date: 2025  Patient Name: Lulu Werner      CSN: [id number]   : 1935  (89 y.o.)  Gender: female   Referring Physician:  Pushpa Redd DO   Diagnosis: Acute cerebrovascular accident (CVA) (HCC)  Precautions: Fall risk   Current Diet: Regular diet, thin liquids   Respiratory Status: Room Air  Date of Last MBS/FEES: Not Applicable    Pain:  None reported     Subjective:   Patient positioned upright in recliner with feet elevated and shoes/socks off. Patient declining noon meal until physician present to check her feet (OT noted red/purple coloring with delayed capillary re-fill during shower). Dr. Redd and Dr. Reese present to examine feet, with patient agreeable to ST interventions to follow.     Short-Term Goals:  SHORT TERM GOAL #1:  Goal 1: Patient will consume regular solids/thin liquids with stable pulmonary status and incorporation of trained compensatory strategies to assist with nutrition/hydration measures . GOAL MET  NO NEW GOAL  INTERVENTIONS:  Completed skilled dietary analysis with noon meal consisting of meatloaf, scalloped potatoes and roasted cauliflower. Oral phase of swallow function that is essentially WFL with low suspicion for presence of a pharyngeal dysphagia. No overt s/s aspiration exhibited across all consistencies/trials consumed, certainly not able to exclude pharyngeal phase dysfunction and/or airway invasion events without supportive imaging.

## 2025-06-13 NOTE — PLAN OF CARE
Problem: Discharge Planning  Goal: Discharge to home or other facility with appropriate resources  Note:   [facility]  Physical Medicine and Rehabilitation  Case Management Discharge Note    Date: 2025  Patient Name: Lulu Werner  MRN: [id number]  : 1935 (89 y.o.)    Patient to be discharged on Saturday,  to home. Patient will be under the supervision of her family. Family training was provided on Thursday, . IPR team recommended outpatient PT, OT and ST at discharge. Patient will be receiving services through [facility]. NATHALIA initiated referral on  to [facility] via fax. NATHALIA spoke with Eduarda on  to confirm patient's discharge. No outstanding needs or concerns regarding discharge plan. SW provided contact information for future questions or concerns.

## 2025-06-13 NOTE — PLAN OF CARE
Problem: Chronic Conditions and Co-morbidities  Goal: Patient's chronic conditions and co-morbidity symptoms are monitored and maintained or improved  2025 by Kristine Greco RN  Outcome: Progressing  2025 133 by Mariela Puga RN  Outcome: Progressing  Flowsheets (Taken 2025)  Care Plan - Patient's Chronic Conditions and Co-Morbidity Symptoms are Monitored and Maintained or Improved:   Monitor and assess patient's chronic conditions and comorbid symptoms for stability, deterioration, or improvement   Collaborate with multidisciplinary team to address chronic and comorbid conditions and prevent exacerbation or deterioration   Update acute care plan with appropriate goals if chronic or comorbid symptoms are exacerbated and prevent overall improvement and discharge     Problem: Discharge Planning  Goal: Discharge to home or other facility with appropriate resources  2025 by Kristine Greco RN  Outcome: Progressing  2025 1555 by Marcia Boyd LISW-S  Note:   [facility]  Physical Medicine and Rehabilitation  Post Team Conference Note    Date: 2025  Patient Name: Lulu Werner  MRN: [id number]  : 1935 (89 y.o.)    IPR Team Conference was held on Thursday, . Recommendations of the team were explained to the patient and family by Dr Redd. Team is recommending that patient continue on acute inpatient rehab for PT, OT and ST, with an expected discharge date of Saturday, . Following discharge, team is recommending outpatient PT, OT and ST. Patient and family would like to pursue outpatient therapy at discharge. Care plan reviewed with patient and family. Patient and family verbalized understanding or the plan of care and contributed to goal setting.     NATHALIA spoke with Eduarda at [facility] on this date. Eduarda to call patient's son, Matthew, on  to schedule evaluations.    NATHALIA spoke with Matthew to confirm referral for therapy. Matthew had no  outstanding needs or concerns.    SW to follow and maintain involvement in discharge planning.    Plan  Referrals Needed: No additional referrals needed  Family Training: Thursday, 6/12  Driving Recommendations: Driving Evaluations   6/12/2025 1339 by Mariela Puga, RN  Outcome: Progressing  Flowsheets (Taken 6/12/2025 1339)  Discharge to home or other facility with appropriate resources:   Identify barriers to discharge with patient and caregiver   Arrange for needed discharge resources and transportation as appropriate   Identify discharge learning needs (meds, wound care, etc)     Problem: Skin/Tissue Integrity  Goal: Skin integrity remains intact  Description: 1.  Monitor for areas of redness and/or skin breakdown2.  Assess vascular access sites hourly3.  Every 4-6 hours minimum:  Change oxygen saturation probe site4.  Every 4-6 hours:  If on nasal continuous positive airway pressure, respiratory therapy assess nares and determine need for appliance change or resting period  6/13/2025 0047 by Kristine Greco, RN  Outcome: Progressing  6/12/2025 1339 by Mariela Puga, RN  Outcome: Progressing  Flowsheets (Taken 6/12/2025 1339)  Skin Integrity Remains Intact:   Monitor for areas of redness and/or skin breakdown   Every 4-6 hours minimum:  Change oxygen saturation probe site   Every 4-6 hours:  If on nasal continuous positive airway pressure, assess nares and determine need for appliance change or resting period     Problem: Safety - Adult  Goal: Free from fall injury  Outcome: Progressing     Problem: ABCDS Injury Assessment  Goal: Absence of physical injury  Outcome: Progressing     Problem: Pain  Goal: Verbalizes/displays adequate comfort level or baseline comfort level  Outcome: Progressing     Problem: Nutrition Deficit:  Goal: Optimize nutritional status  Outcome: Progressing

## 2025-06-13 NOTE — DISCHARGE SUMMARY
Physical Medicine & Rehabilitation   Discharge Summary     Patient Identification:  Lulu Werner  : 1935  Admit date: 6/3/2025  Discharge date: 2025   Attending provider: Pushpa Redd DO        Primary care provider: No primary care provider on file.     Discharge Diagnoses:   CVA: Acute infarcts of the left subinsular cortex, left parietal subcortical white matter, and right frontal subcortical white matter   Expressive aphasia  Dysphagia  Right hemiparesis  Proteus UTI  Superficial thrombophlebitis  A-fib s/p Watchman  HTN  HLD  GERD  Macular degeneration  Vitamin D deficiency  HFpEF  CHRISTY        Consults:   Family Medicine      Inpatient Acute Hospital Course:   Lulu Werner  is a 89 y.o. female with PMH significant for trial fibrillation, glaucoma, vitreous hemorrhage of right eye, CHF, macular degeneration, and HTN  who is being admitted to the inpatient rehabilitation unit on 6/3/2025. History obtained via: ED documentation, acute care documentation, and patient.     Patient initially presented to [place] ED for evaluation of left-sided weakness, headache and decreased vision out of her right eye.  Due to concern for stroke, patient was transferred to [facility] ED via [facility].  Last known normal was the prior night.  CTh was reportedly negative for acute hemorrhage but reportedly showed moderate severity chronic small vessel ischemic changes, suspected small meningioma in the floor of the right anterior cranial fossa.  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  Patient was determined not to be a TNK candidate as she was outside of the window.  She was also deemed to not be a candidate for thrombectomy.  Patient was admitted for further evaluation management.     On admission, MRI of the brain was ordered and obtained on 2025 which was    vitamin D (CHOLECALCIFEROL) 25 MCG (1000 UT) TABS tablet Take 1 tablet by mouth daily      XALATAN 0.005 % ophthalmic solution Apply 1 drop to eye daily      famotidine (PEPCID) 20 MG tablet TAKE 1 TABLET BY MOUTH TWICE  DAILY  Qty: 180 tablet, Refills: 3    Comments: Please send a replace/new response with 90-Day Supply if appropriate to maximize member benefit. Requesting 1 year supply.  Associated Diagnoses: GAVE (gastric antral vascular ectasia); Gastritis and duodenitis; Gastroesophageal reflux disease without esophagitis      bumetanide (BUMEX) 1 MG tablet Take 1 tablet by mouth daily  Qty: 90 tablet, Refills: 3      !! multivitamin-iron-minerals-folic acid (CENTRUM) chewable tablet Take by mouth daily      !! Multiple Vitamins-Minerals (PRESERVISION AREDS 2 PO) Take 1 tablet by mouth in the morning and at bedtime      Omega-3 Fatty Acids (FISH OIL) 1000 MG capsule Take by mouth daily      triamcinolone (KENALOG) 0.1 % cream Apply topically 2 times daily Apply topically 2 times daily.       !! - Potential duplicate medications found. Please discuss with provider.           Controlled substances monitoring: not applicable.     35 minutes spent preparing the patient for discharge    Pushpa Redd DO

## 2025-06-13 NOTE — PROGRESS NOTES
[facility]  INPATIENT SPEECH THERAPY  MH-STRZ 8K IP REHAB  DAILY NOTE    Discharge Recommendations: Outpatient Speech Therapy; Will need 24/7 supervision at discharge  DIET ORDER RECOMMENDATIONS: Regular diet, thin liquids  STRATEGIES: Full Upright Position, Small Bite/Sip, and Alternate Solids and Liquids     SLP Individual Minutes  Time In: 0730  Time Out: 0800  Minutes: 30  Timed Code Treatment Minutes: 0 Minutes     Dysphagia Tx: 0 minutes  Speech-Language Tx: 30 minutes   Cognitive Tx: 0 minutes    Date: 2025  Patient Name: Lulu Werner      CSN: [id number]   : 1935  (89 y.o.)  Gender: female   Referring Physician:  Pushpa Redd DO   Diagnosis: Acute cerebrovascular accident (CVA) (HCC)  Precautions: Fall risk   Current Diet: Regular diet, thin liquids   Respiratory Status: Room Air  Date of Last MBS/FEES: Not Applicable    Pain:  None reported     Subjective:   Upon arrival, patient positioned upright in recliner, alert and cooperative. Improved mood and participation this session. No family present.     Short-Term Goals:  SHORT TERM GOAL #1:  Goal 1: Patient will consume regular solids/thin liquids with stable pulmonary status and incorporation of trained compensatory strategies to assist with nutrition/hydration measures   INTERVENTIONS:  Did not address due to focus on other goals.     WBC   Date Value Ref Range Status   2025 5.1 4.8 - 10.8 thou/mm3 Final       Right Upper Lobe: Clear  Right Middle Lobe: Clear  Right Lower Lobe: Clear  Left Upper Lobe: Clear  Left Lower Lobe: Clear    *No recent chest imaging.     SHORT TERM GOAL #2:  Goal 2: Considerations for an instrumental evaluation should adverse changes be conveyed in direct relation to the safety/efficency of the swallow mechanism.   INTERVENTIONS: Not clinically indicated at this time.     SHORT TERM GOAL #3:  Goal 3: Patient will complete verbal expression tasks (including BASIC naming, automatic speech tasks,  focus on phone use as a method for communicating emergency needs during team conference and family education yesterday. Family indicating that patient's phone is \"difficult to use/navigate\" even for the family. Attempted to have patient practice using the phone to call family during session, however phone with low battery, and no  cord. Patient indicating family had \"worked on it\" last evening, hoping to create a \"favorite's list\" to make finding key family members easier for patient. Will need further focus in future sessions when phone is charged.     Long-Term Goals:  Time Frame for Long Term Goals: 3 weeks    LONG TERM GOAL #1:  Goal 1: Patient will consume regular solids/thin liquids with stable pulmonary status and incorporation of trained compensatory strategies to assist with nutrition/hydration measures.     LONG TERM GOAL #2:  Goal 2: Patient will improve receptive/expressive language skills to a level of min assist to improve every day communication.     Functional Oral Intake Scale: Total Oral Intake: 7.  Total oral intake with no restrictions    EDUCATION:  Learner: Patient  Education:  Education Related to Stroke Diagnosis, Reviewed ST goals and Plan of Care, Reviewed recommendations for follow-up, and Education Related to Potential Risks and Complications Due to Impairment/Illness/Injury  Evaluation of Education: Verbalizes understanding, Needs further instruction, and Family not present    ASSESSMENT/PLAN:    Activity Tolerance:  Patient tolerance of treatment: good.      Assessment/Plan: Patient progressing toward established goals.  Continues to require skilled care of licensed speech pathologist to progress toward achievement of established goals and plan of care.    Plan for Next Session: Dietary analysis during lunch meal, expressive/receptive language    Garima Dolan MS, CCC-SLP [id number]

## 2025-06-13 NOTE — PROGRESS NOTES
[facility]  Recreational Therapy  Discharge Note  Inpatient Rehabilitation Unit         Date:  6/13/2025       Patient Name: Lulu Werner      MRN: [id number]       YOB: 1935 (89 y.o.)       Gender: female     Referring Practitioner: Pushpa Redd DO    Patient discharged from Recreational Therapy at this time.  See recreational therapy notes for details.    Electronically signed by: Any Muller CTRS  Date: 6/13/2025

## 2025-06-13 NOTE — PROGRESS NOTES
Patient: Lulu Werner  Unit/Bed: 8K-05/005-A  YOB: 1935  MRN: [id number] Acct: [id number]   Admitting Diagnosis: Stroke (cerebrum) (HCC) [I63.9]  Acute cerebrovascular accident (CVA) (HCC) [I63.9]  Admit Date:  6/3/2025  Hospital Day: 10    Assessment:     Principal Problem:    Acute cerebrovascular accident (CVA) (HCC)  Resolved Problems:    * No resolved hospital problems. *      Plan:     The BP is improved today.  Medically stable for discharge tomorrow.        Subjective:     Patient has no complaint of CP or SOB..   Medication side effects: none    Scheduled Meds:   dilTIAZem  180 mg Oral Daily    metoprolol tartrate  100 mg Oral BID    famotidine  20 mg Oral Daily    multivitamin  1 tablet Oral Daily    fish oil  1 capsule Oral Daily    potassium chloride  10 mEq Oral Daily    triamcinolone   Topical BID    latanoprost  1 drop Both Eyes Nightly    atorvastatin  80 mg Oral Nightly    clopidogrel  75 mg Oral Daily    aspirin  325 mg Oral Daily    enoxaparin  30 mg SubCUTAneous Daily    senna  1 tablet Oral Nightly    docusate sodium  100 mg Oral Daily    polyethylene glycol  17 g Oral Daily    [START ON 6/21/2025] aspirin  81 mg Oral Daily     Continuous Infusions:   sodium chloride       PRN Meds:sodium chloride flush, sodium chloride, melatonin, ondansetron, acetaminophen, magnesium hydroxide, docusate sodium    Review of Systems  Pertinent items are noted in HPI.    Objective:     Patient Vitals for the past 8 hrs:   BP Temp Temp src Pulse Resp SpO2   06/13/25 1027 123/88 -- Oral -- -- --   06/13/25 1000 123/88 98.1 °F (36.7 °C) Oral 84 18 98 %     I/O last 3 completed shifts:  In: 678 [P.O.:678]  Out: -   I/O this shift:  In: 240 [P.O.:240]  Out: -     /88   Pulse 84   Temp 98.1 °F (36.7 °C) (Oral)   Resp 18   Ht 1.524 m (5')   Wt 58 kg (127 lb 13.9 oz)   SpO2 98%   BMI 24.97 kg/m²     General appearance: alert, appears stated age, and cooperative  Head: Normocephalic,

## 2025-06-13 NOTE — PROGRESS NOTES
Transportation:   Has transportation kept you from medical appointments, meetings, work, or from getting things needed for daily living? (Check all that apply)  No.      Health Literacy:   How often do you need to have someone help you when you read instructions, pamphlets, or other written material from your doctor or pharmacy?  0. - Never    Social Isolation:  How often do you feel lonely or isolated from those around you?  0. Never      Patient Mood Interview (PHQ-2 to 9) (from [employer]©)   Say to Patient: \"Over the last 2 weeks, have you been bothered by any of the following problems?\"   If symptom is present, enter 1 (yes) in column 1 (Symptom Presence)  If yes in column 1, then ask the patient: “About how often have you been bothered by this?”  Read and show the patient a card with the symptom frequency choices.    Indicate response in column 2, Symptom Frequency.   Symptom Presence  No (enter 0 in column 2)   Yes (enter 0-3 in column 2)  9.    No response (leave column 2 blank) Symptom Frequency  Never or 1 day  2-6 days (several days)  7-11 days (half or more of the days)  12-14 days (nearly every day    Symptom Presence Symptom Frequency   Little interest or pleasure in doing things 1. Yes 1. 2-6 days (several days)   Feeling down, depressed, or hopeless 1. Yes 1. 2-6 days (several days)     If either A or B above has symptom frequency coded 2 or 3, CONTINUE asking questions below.  If not, END the interview   Trouble falling or staying asleep, or sleeping too much 1. Yes 0. Never or 1 day   Feeling tired or having little energy 0. No 0. Never or 1 day   Poor appetite or overeating 1. Yes 1. 2-6 days (several days)   Feeling bad about yourself-or that you are a failure or have let yourself or your family down 0. No 0. Never or 1 day   Trouble concentrating on things, such as reading the newspaper or watching television 0. No 0. Never or 1 day    Moving or speaking so slowly that other people could have  noticed.   Or the opposite-being so fidgety or restless that you have been moving around a lot more than usual 1. Yes 1. 2-6 days (several days)   Thoughts that you would be better off dead, or of hurting yourself in some way 0. No 0. Never or 1 day

## 2025-06-13 NOTE — CARE COORDINATION
Patient educated on how to use incentive spirometer. Patient verbalized understanding and demonstrated proper use. Emphasized importance and usage of device, with coughing and deep breathing every 4 hours while awake.     Therapy noted increased redness to toes, reported to Dr Redd who obtained a arterial doppler, no significant stenosis noted and reported to family.  Pt has been pleasant with staff today

## 2025-06-13 NOTE — PROGRESS NOTES
Accessibility: Accessible       Prior Level of Assist for ADLs: Independent  Prior Level of Assist for Homemaking: Independent  Prior Level of Assist for Transfers: Independent  Prior Level of Assist for Ambulation: Independent household ambulator, with or without device, Independent community ambulator, with or without device  Has the patient had two or more falls in the past year or any fall with injury in the past year?: Yes    Active : Yes  Occupation: Retired  Leisure & Hobbies: None stated  Additional Comments: Pt provided above information, may need to confirm accuracy due to pt demo reduced cognition/poor historian at this time.  Pt reports independence with ADLs/IADLs including money management and medication management, pt reports she does not use or own any AD.  Pt reports she has family whom live nearby.    SUBJECTIVE: Pt resting in bedside chair and agreeable to OT session.      PAIN: None noted    Vitals: Vitals not assessed per clinical judgement, see nursing flowsheet    COGNITION: Expressive Aphasia  BIMs assessement administered with patient scoring 3/15.  CAM completed with patient this date.    ADL:     EATING:Independent.   .  CARE Score: 6.     ORAL HYGIENE:Independent.  Mod I standing sinkside for oral care.  CARE Score: 6.     TOILETING HYGIENE:Independent.  Mod I for clothing management and gunnar care, increased time allotted for problem solving, but no cues requried..  CARE Score: 6.     SHOWERING/BATHING:Setup or clean-up assistance.  Bathing task performed in walk in shower environment with use of shower chair,  shower head and grab bars, set up assistance provided..  CARE Score: 5.     UPPER BODY DRESSING:Setup or clean-up assistance.  mod I to doff, set up to don.  CARE Score: 5.     LOWER BODY DRESSING:Setup or clean-up assistance.  mod I to doff, set up to don.  CARE Score: 5.     FOOTWEAR:Setup or clean-up assistance   .  CARE Score: 5.     TOILET TRANSFER: Independent.  Mod I  to/from STS, more than one trial during ADLs.. CARE Score: 6.     SHOWER TRANSFER: Mod I  GROOMING: Mod I standing sinkside to comb hair    TRANSFERS:  Sit to Stand:  Modified Independent.    Stand to Sit: Modified Independent.      FUNCTIONAL MOBILITY:  Assistive Device: Rolling Walker  Assist Level:  Supervision.   Distance: To and from bathroom      Functional Outcome Measures:     Modified Barthel Index administered this date with pt scoring 78/100 indicating Moderate Dependence (61-90) with daily task completion. Scores from 60-80 typically indicate that a number of community services will be required for patients to cope if living alone.      Modified [place]:  Current Functional Status:  +3 - Moderate disability; requiring some help, but able to walk without physical assistance (SBA/CGA).   Patient could not live alone but could walk from one room to another without physical help from another person.  Education provided regarding stroke rehabilitation management.    Education:  Learners: Patient  Progression with POC    ASSESSMENT:     Activity Tolerance:  Patient tolerance of  treatment: Good treatment tolerance       Plan: Times Per Week: 5x/wk for 60 minutes  Current Treatment Recommendations: Balance training, Functional mobility training, Self-Care / ADL, Safety education & training, Endurance training, ROM, Strengthening, Neuromuscular re-education, Cognitive reorientation, Equipment evaluation, education, & procurement, Home management training, Modalities, Positioning, Wheelchair mobility training, Patient/Caregiver education & training, Gait training, Stair training, Coordination training    Goals  Short Term Goals  Time Frame for Short Term Goals: 1 week  Short Term Goal 1: GOAL MET  Short Term Goal 2: Pt. to retrieve/transport at least 2 items with Supervision to maximize performance with self care prep.  Short Term Goal 3: Pt to open/close 4/5 containers with supervision to maximize performance

## 2025-06-13 NOTE — PLAN OF CARE
Problem: Discharge Planning  Goal: Discharge to home or other facility with appropriate resources  2025 1501 by Janis Graham LPN  Outcome: Progressing  2025 1452 by Marcia Boyd LISW-S  Note:   [facility]  Physical Medicine and Rehabilitation  Case Management Discharge Note    Date: 2025  Patient Name: Lulu Werner  MRN: [id number]  : 1935 (89 y.o.)    Patient to be discharged on Saturday,  to home. Patient will be under the supervision of her family. Family training was provided on Thursday, . IPR team recommended outpatient PT, OT and ST at discharge. Patient will be receiving services through [facility]. NATHALIA initiated referral on  to [facility] via fax. NATHALIA spoke with Eduarda on  to confirm patient's discharge. No outstanding needs or concerns regarding discharge plan. NATHALIA provided contact information for future questions or concerns.      Problem: Skin/Tissue Integrity  Goal: Skin integrity remains intact  Description: 1.  Monitor for areas of redness and/or skin breakdown2.  Assess vascular access sites hourly3.  Every 4-6 hours minimum:  Change oxygen saturation probe site4.  Every 4-6 hours:  If on nasal continuous positive airway pressure, respiratory therapy assess nares and determine need for appliance change or resting period  Outcome: Progressing  Flowsheets (Taken 2025 1500)  Skin Integrity Remains Intact: Monitor for areas of redness and/or skin breakdown     Problem: Pain  Goal: Verbalizes/displays adequate comfort level or baseline comfort level  Outcome: Progressing

## 2025-06-13 NOTE — PLAN OF CARE
Problem: Chronic Conditions and Co-morbidities  Goal: Patient's chronic conditions and co-morbidity symptoms are monitored and maintained or improved  Outcome: Progressing     Problem: Skin/Tissue Integrity  Goal: Skin integrity remains intact  Description: 1.  Monitor for areas of redness and/or skin breakdown2.  Assess vascular access sites hourly3.  Every 4-6 hours minimum:  Change oxygen saturation probe site4.  Every 4-6 hours:  If on nasal continuous positive airway pressure, respiratory therapy assess nares and determine need for appliance change or resting period  Outcome: Progressing  Flowsheets (Taken 6/13/2025 1500)  Skin Integrity Remains Intact: Monitor for areas of redness and/or skin breakdown     Problem: Safety - Adult  Goal: Free from fall injury  Outcome: Progressing     Problem: Pain  Goal: Verbalizes/displays adequate comfort level or baseline comfort level  6/13/2025 1954 by Janis Graham LPN  Outcome: Progressing  6/13/2025 1501 by Janis Graham LPN  Outcome: Progressing

## 2025-06-14 VITALS
WEIGHT: 127.87 LBS | OXYGEN SATURATION: 90 % | SYSTOLIC BLOOD PRESSURE: 141 MMHG | RESPIRATION RATE: 16 BRPM | BODY MASS INDEX: 25.1 KG/M2 | HEART RATE: 66 BPM | HEIGHT: 60 IN | DIASTOLIC BLOOD PRESSURE: 96 MMHG | TEMPERATURE: 98.1 F

## 2025-06-14 PROCEDURE — 92507 TX SP LANG VOICE COMM INDIV: CPT

## 2025-06-14 PROCEDURE — 97112 NEUROMUSCULAR REEDUCATION: CPT

## 2025-06-14 PROCEDURE — 6370000000 HC RX 637 (ALT 250 FOR IP): Performed by: STUDENT IN AN ORGANIZED HEALTH CARE EDUCATION/TRAINING PROGRAM

## 2025-06-14 PROCEDURE — 97530 THERAPEUTIC ACTIVITIES: CPT

## 2025-06-14 PROCEDURE — 6370000000 HC RX 637 (ALT 250 FOR IP): Performed by: FAMILY MEDICINE

## 2025-06-14 PROCEDURE — 6360000002 HC RX W HCPCS: Performed by: STUDENT IN AN ORGANIZED HEALTH CARE EDUCATION/TRAINING PROGRAM

## 2025-06-14 PROCEDURE — 97116 GAIT TRAINING THERAPY: CPT

## 2025-06-14 PROCEDURE — 97535 SELF CARE MNGMENT TRAINING: CPT

## 2025-06-14 RX ADMIN — POLYETHYLENE GLYCOL 3350 17 G: 17 POWDER, FOR SOLUTION ORAL at 08:40

## 2025-06-14 RX ADMIN — Medication 1000 MG: at 08:41

## 2025-06-14 RX ADMIN — CLOPIDOGREL BISULFATE 75 MG: 75 TABLET, FILM COATED ORAL at 08:40

## 2025-06-14 RX ADMIN — Medication 1 TABLET: at 08:41

## 2025-06-14 RX ADMIN — ASPIRIN 325 MG: 325 TABLET, COATED ORAL at 08:40

## 2025-06-14 RX ADMIN — DOCUSATE SODIUM 100 MG: 100 CAPSULE, LIQUID FILLED ORAL at 08:41

## 2025-06-14 RX ADMIN — FAMOTIDINE 20 MG: 20 TABLET, FILM COATED ORAL at 08:41

## 2025-06-14 RX ADMIN — ENOXAPARIN SODIUM 30 MG: 100 INJECTION SUBCUTANEOUS at 08:40

## 2025-06-14 RX ADMIN — DILTIAZEM HYDROCHLORIDE 180 MG: 180 CAPSULE, EXTENDED RELEASE ORAL at 08:40

## 2025-06-14 RX ADMIN — POTASSIUM CHLORIDE 10 MEQ: 1500 TABLET, EXTENDED RELEASE ORAL at 08:41

## 2025-06-14 RX ADMIN — METOPROLOL 100 MG: 100 TABLET ORAL at 08:41

## 2025-06-14 NOTE — PLAN OF CARE
Problem: Chronic Conditions and Co-morbidities  Goal: Patient's chronic conditions and co-morbidity symptoms are monitored and maintained or improved  6/14/2025 1059 by Dana Navarro LPN  Outcome: Progressing  Flowsheets (Taken 6/14/2025 0834)  Care Plan - Patient's Chronic Conditions and Co-Morbidity Symptoms are Monitored and Maintained or Improved: Monitor and assess patient's chronic conditions and comorbid symptoms for stability, deterioration, or improvement     Problem: Discharge Planning  Goal: Discharge to home or other facility with appropriate resources  6/14/2025 1059 by Dana Navarro LPN  Outcome: Progressing  Flowsheets (Taken 6/14/2025 0834)  Discharge to home or other facility with appropriate resources: Identify barriers to discharge with patient and caregiver     Problem: Skin/Tissue Integrity  Goal: Skin integrity remains intact  Description: 1.  Monitor for areas of redness and/or skin breakdown2.  Assess vascular access sites hourly3.  Every 4-6 hours minimum:  Change oxygen saturation probe site4.  Every 4-6 hours:  If on nasal continuous positive airway pressure, respiratory therapy assess nares and determine need for appliance change or resting period  6/14/2025 1059 by Dana Navarro LPN  Outcome: Progressing  Flowsheets (Taken 6/14/2025 0834)  Skin Integrity Remains Intact: Monitor for areas of redness and/or skin breakdown  Note: No new skin issues noted this shift.

## 2025-06-14 NOTE — PROGRESS NOTES
[facility]  [facility] 8K IP REHAB  Occupational Therapy  Discharge Note    Discharge Recommendations: 24 hour assistance or supervision and Home with Outpatient OT  Equipment Recommendations:   Continue to address/confirm what equipment pt owns as appropriate/able.      Time In: 09  Time Out: 1000  Timed Code Treatment Minutes: 30 Minutes  Minutes: 30          Date: 2025  Patient Name: Lulu Werner,   Gender: female      Room: [facility]005-A  MRN: [id number]  : 1935  (89 y.o.)  Referring Practitioner: Pushpa Redd DO  Diagnosis: Acute cerebrovascular accident (CVA) (HCC)  Additional Pertinent Hx: Lulu Collins whom presented to the ED from [facility] with 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  MRI indicated interval deteiroation since previous study with new areas of restricted diffusion in L subinsular cortex, L parietal subcortical white matter and R frontal subcortical white mater.  It is noted repeat MRI indicated L hemispheric CVAs secondary to the L carotid occlusion.  Hospital course complicated by a-fib RVR, UTI, edema in RUE as superficial thrombophlebitis present. See Dr. Redd H&P for additional informtion. Pt admitted to the IPR unit on 6/3/25 for further medical care and referred to skilled OT services at this time.    Restrictions/Precautions:  Restrictions/Precautions: Fall Risk, General Precautions    Social/Functional History:  Lives With: Alone  Type of Home: House  Home Layout: Two level, Performs ADL's on one level, Able to Live on Main level with bedroom/bathroom  Home Access: Stairs to enter with rails  Entrance Stairs - Number of Steps: 3 CATHERINE at front and garage entrance  Entrance Stairs - Rails: Right  Home Equipment: Cane, Walker - Rolling   Bathroom Shower/Tub: Walk-in shower  Bathroom Toilet: Standard  Bathroom Equipment: None  Bathroom Accessibility: Accessible       Prior Level of Assist

## 2025-06-14 NOTE — PLAN OF CARE
Problem: Chronic Conditions and Co-morbidities  Goal: Patient's chronic conditions and co-morbidity symptoms are monitored and maintained or improved  6/14/2025 1146 by Saba Syed RN  Outcome: Adequate for Discharge  6/14/2025 1059 by Dana Navarro LPN  Outcome: Progressing  Flowsheets (Taken 6/14/2025 0834)  Care Plan - Patient's Chronic Conditions and Co-Morbidity Symptoms are Monitored and Maintained or Improved: Monitor and assess patient's chronic conditions and comorbid symptoms for stability, deterioration, or improvement  6/14/2025 0253 by Pattie Pritchard RN  Outcome: Progressing  Flowsheets (Taken 6/14/2025 0253)  Care Plan - Patient's Chronic Conditions and Co-Morbidity Symptoms are Monitored and Maintained or Improved:   Monitor and assess patient's chronic conditions and comorbid symptoms for stability, deterioration, or improvement   Collaborate with multidisciplinary team to address chronic and comorbid conditions and prevent exacerbation or deterioration     Problem: Discharge Planning  Goal: Discharge to home or other facility with appropriate resources  6/14/2025 1146 by Saba Syed RN  Outcome: Adequate for Discharge  6/14/2025 1059 by Dana Navarro LPN  Outcome: Progressing  Flowsheets (Taken 6/14/2025 0834)  Discharge to home or other facility with appropriate resources: Identify barriers to discharge with patient and caregiver  6/14/2025 0253 by Pattie Pritchard RN  Outcome: Progressing  Flowsheets (Taken 6/14/2025 0253)  Discharge to home or other facility with appropriate resources:   Identify barriers to discharge with patient and caregiver   Arrange for needed discharge resources and transportation as appropriate   Identify discharge learning needs (meds, wound care, etc)     Problem: Skin/Tissue Integrity  Goal: Skin integrity remains intact  Description: 1.  Monitor for areas of redness and/or skin breakdown2.  Assess vascular access sites hourly3.  Every 4-6 hours minimum:

## 2025-06-14 NOTE — DISCHARGE INSTR - COC
Continuity of Care Form    Patient Name: Lulu Werner   :  1935  MRN:  [id number]    Admit date:  6/3/2025  Discharge date:  ***    Code Status Order: Limited   Advance Directives:     Admitting Physician:  Pushpa Redd DO  PCP: No primary care provider on file.    Discharging Nurse: ***  Discharging Hospital Unit/Room#: 8K-05/005-A  Discharging Unit Phone Number: ***    Emergency Contact:   Extended Emergency Contact Information  Primary Emergency Contact: Nba Beltran  Home Phone: [phone]  Relation: Child  Secondary Emergency Contact: Katie Beltran  Home Phone: [phone]  Relation: Daughter-in-Law    Past Surgical History:  Past Surgical History:   Procedure Laterality Date    CARPAL TUNNEL RELEASE Bilateral     EP DEVICE PROCEDURE N/A 2024    Left atrial appendage closure (other) performed by Dakotah Lux MD at [facility] CATH LAB ; [facility] FLX    NECK SURGERY      TONSILLECTOMY      TRANSESOPHAGEAL ECHOCARDIOGRAM N/A 2024    TRANSESOPHAGEAL ECHOCARDIOGRAM performed by Prabhakar Huston MD at [facility] ENDOSCOPY    UPPER GASTROINTESTINAL ENDOSCOPY N/A 2024    EGD with APC performed by Dianelys Sims MD at [facility] ENDOSCOPY       Immunization History:   Immunization History   Administered Date(s) Administered    COVID-19, MODERNA BLUE border, Primary or Immunocompromised, (age 12y+), IM, 100 mcg/0.5mL 2021, 2021, 10/28/2021    COVID-19, MODERNA Bivalent, (age 12y+), IM, 50 mcg/0.5 mL 2022    COVID-19, MODERNA PURPLE border, (age 18y+ booster, 6y-11y series), IM, 50 mcg/0.5 mL 10/28/2021    Influenza A (V1L8-48) Vaccine PF IM 2009    Influenza Virus Vaccine 10/25/2007, 2011, 2016, 10/26/2017    Influenza, FLUAD, (age 65 y+), IM, Quadv, 0.5mL 10/06/2020    Influenza, FLUAD, (age 65 y+), IM, Trivalent PF, 0.5mL 10/08/2018    Influenza, FLUZONE High Dose, (age 65 y+), IM, Trivalent PF, 0.5mL 2019, 10/28/2021, 10/20/2022, 2023     score (0-10 scale): Pain Level: 0  Last Weight:   Wt Readings from Last 1 Encounters:   25 58 kg (127 lb 13.9 oz)     Mental Status:  {IP PT MENTAL STATUS:}    IV Access:  { PATEL IV ACCESS:943069517}    Nursing Mobility/ADLs:  Walking   {CHP DME ADLs:153215334}  Transfer  {CHP DME ADLs:324763835}  Bathing  {CHP DME ADLs:372516418}  Dressing  {CHP DME ADLs:129359751}  Toileting  {CHP DME ADLs:200563339}  Feeding  {CHP DME ADLs:027254389}  Med Admin  {CHP DME ADLs:134700188}  Med Delivery   { PATEL MED Delivery:658339210}    Wound Care Documentation and Therapy:        Elimination:  Continence:   Bowel: {YES / NO:}  Bladder: {YES / NO:}  Urinary Catheter: {Urinary Catheter:277567247}   Colostomy/Ileostomy/Ileal Conduit: {YES / NO:}       Date of Last BM: ***    Intake/Output Summary (Last 24 hours) at 2025 0747  Last data filed at 2025 0541  Gross per 24 hour   Intake 340 ml   Output --   Net 340 ml     I/O last 3 completed shifts:  In: 340 [P.O.:340]  Out: -     Safety Concerns:     { PATEL Safety Concerns:088250403}    Impairments/Disabilities:      {[facility] Impairments/Disabilities:731434737}    Nutrition Therapy:  Current Nutrition Therapy:   { PATEL Diet List:360079370}    Routes of Feeding: {[facility] DME Other Feedings:481438481}  Liquids: {Slp liquid thickness:70665}  Daily Fluid Restriction: {CHP DME Yes amt example:199782183}  Last Modified Barium Swallow with Video (Video Swallowing Test): {Done Not Done Date:}    Treatments at the Time of Hospital Discharge:   Respiratory Treatments: ***  Oxygen Therapy:  {Therapy; copd oxygen:94012}  Ventilator:    { CC Vent List:906769910}    Rehab Therapies: {THERAPEUTIC INTERVENTION:6667545121}  Weight Bearing Status/Restrictions: { CC Weight Bearin}  Other Medical Equipment (for information only, NOT a DME order):  {EQUIPMENT:644078600}  Other Treatments: ***    Patient's personal belongings (please select all that

## 2025-06-14 NOTE — PROGRESS NOTES
[facility]  INPATIENT PHYSICAL THERAPY  DISCHARGE NOTE  [facility] 8K IP REHAB - 8K-005-A      Discharge Recommendations: 24 hour assistance or supervision and Home with Outpatient PT  Equipment Recommendations:  (Continue to assess for equipment needs)               Time In: 1000  Time Out: 1028  Timed Code Treatment Minutes: 28 Minutes  Minutes: 28          Date: 2025  Patient Name: Lulu Werner,  Gender:  female        MRN: [id number]  : 1935  (89 y.o.)     Referring Practitioner: Pushpa Redd DO  Diagnosis: Acute cerebrovascular accident (CVA) (HCC)  Additional Pertinent Hx: Per EMR: \"Acute cerebrovascular accident (CVA) (HCC)  Additional Pertinent Hx: Lulu Collins whom presented to the ED from [facility] with 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  MRI indicated interval deteiroation since previous study with new areas of restricted diffusion in L subinsular cortex, L parietal subcortical white matter and R frontal subcortical white mater.  It is noted repeat MRI indicated L hemispheric CVAs secondary to the L carotid occlusion.  Hospital course complicated by a-fib RVR, UTI, edema in RUE as superficial thrombophlebitis present. See Dr. Redd H&P for additional informtion. Pt admitted to the IPR unit on 6/3/25 for further medical care and referred to skilled PT services at this time.\"     Prior Level of Function:  Lives With: Alone  Type of Home: House  Home Layout: Two level, Performs ADL's on one level, Able to Live on Main level with bedroom/bathroom  Home Access: Stairs to enter with rails  Entrance Stairs - Number of Steps: 3 CATHERINE at front and garage entrance  Entrance Stairs - Rails: Right  Home Equipment: Cane, Walker - Rolling   Bathroom Shower/Tub: Walk-in shower  Bathroom Toilet: Standard  Bathroom Equipment: None  Bathroom Accessibility: Accessible    Prior Level of Assist for ADLs:

## 2025-06-14 NOTE — PLAN OF CARE
Problem: Chronic Conditions and Co-morbidities  Goal: Patient's chronic conditions and co-morbidity symptoms are monitored and maintained or improved  6/14/2025 0253 by Pattie Pritchard RN  Outcome: Progressing  Flowsheets (Taken 6/14/2025 0253)  Care Plan - Patient's Chronic Conditions and Co-Morbidity Symptoms are Monitored and Maintained or Improved:   Monitor and assess patient's chronic conditions and comorbid symptoms for stability, deterioration, or improvement   Collaborate with multidisciplinary team to address chronic and comorbid conditions and prevent exacerbation or deterioration    Problem: Discharge Planning  Goal: Discharge to home or other facility with appropriate resources  6/14/2025 0253 by Pattie Pritchard, RN  Outcome: Progressing  Flowsheets (Taken 6/14/2025 0253)  Discharge to home or other facility with appropriate resources:   Identify barriers to discharge with patient and caregiver   Arrange for needed discharge resources and transportation as appropriate   Identify discharge learning needs (meds, wound care, etc)     Problem: ABCDS Injury Assessment  Goal: Absence of physical injury  6/14/2025 0253 by Pattie Pritchard RN  Outcome: Progressing  Flowsheets (Taken 6/14/2025 0253)  Absence of Physical Injury: Implement safety measures based on patient assessment

## 2025-06-14 NOTE — PROGRESS NOTES
[facility]  INPATIENT SPEECH THERAPY  [facility] 8K IP REHAB  DISCHARGE NOTE    Discharge Recommendations: Outpatient Speech Therapy; Will need 24/ supervision/assistance at discharge  DIET ORDER RECOMMENDATIONS: Regular diet, thin liquids  STRATEGIES: Full Upright Position, Small Bite/Sip, and Alternate Solids and Liquids     SLP Individual Minutes  Time In: 0730  Time Out: 0800  Minutes: 30  Timed Code Treatment Minutes: 0 Minutes     Speech/Language tx: 30 minutes    Date: 2025  Patient Name: Lulu Werner      CSN: [id number]   : 1935  (89 y.o.)  Gender: female   Referring Physician:  Pushpa Redd DO   Diagnosis: Acute cerebrovascular accident (CVA) (HCC)  Precautions: Fall risk   Current Diet: Regular diet, thin liquids   Respiratory Status: Room Air  Date of Last MBS/FEES: Not Applicable    Pain:  None reported     Subjective:   Patient sitting in recliner upon ST arrival. Agreeable to skilled ST services. No family present. Pleasant and cooperative throughout.     Short-Term Goals:  SHORT TERM GOAL #1:  Goal 1: Patient will consume regular solids/thin liquids with stable pulmonary status and incorporation of trained compensatory strategies to assist with nutrition/hydration measures . GOAL MET  INTERVENTIONS:  Did not address d/t focus on other goals  PREVIOUS SESSION  Completed skilled dietary analysis with noon meal consisting of meatloaf, scalloped potatoes and roasted cauliflower. Oral phase of swallow function that is essentially WFL with low suspicion for presence of a pharyngeal dysphagia. No overt s/s aspiration exhibited across all consistencies/trials consumed, certainly not able to exclude pharyngeal phase dysfunction and/or airway invasion events without supportive imaging. Patient's swallow physiology does appear appropriate to support PO intake without distress with instrumental evaluation not warranted.     Recommend continuation of regular diet with thin liquids. No  at this time due to discharge from [facility].  Discharge Disposition: home.  Continued Speech Therapy Services recommended: Yes        Rosa Maria Arteaga MS, CCC-SLP [id number]

## 2025-06-14 NOTE — CARE COORDINATION
Patient has been pleasant tonight, and is calm. Dressed into pajamas, is using call light appropriately.

## 2025-07-07 RX ORDER — ASPIRIN 81 MG/1
TABLET, CHEWABLE ORAL
Qty: 90 TABLET | Refills: 1 | OUTPATIENT
Start: 2025-07-07

## 2025-07-08 ENCOUNTER — TELEPHONE (OUTPATIENT)
Dept: NEUROLOGY | Age: 89
End: 2025-07-08

## 2025-07-08 ENCOUNTER — OFFICE VISIT (OUTPATIENT)
Dept: NEUROLOGY | Age: 89
End: 2025-07-08
Payer: MEDICARE

## 2025-07-08 VITALS — SYSTOLIC BLOOD PRESSURE: 130 MMHG | DIASTOLIC BLOOD PRESSURE: 82 MMHG

## 2025-07-08 DIAGNOSIS — I65.22 OCCLUSION OF LEFT CAROTID ARTERY: Primary | ICD-10-CM

## 2025-07-08 PROCEDURE — 1111F DSCHRG MED/CURRENT MED MERGE: CPT

## 2025-07-08 PROCEDURE — 1036F TOBACCO NON-USER: CPT

## 2025-07-08 PROCEDURE — 1124F ACP DISCUSS-NO DSCNMKR DOCD: CPT

## 2025-07-08 PROCEDURE — 99214 OFFICE O/P EST MOD 30 MIN: CPT

## 2025-07-08 PROCEDURE — G8420 CALC BMI NORM PARAMETERS: HCPCS

## 2025-07-08 PROCEDURE — 1090F PRES/ABSN URINE INCON ASSESS: CPT

## 2025-07-08 PROCEDURE — G8427 DOCREV CUR MEDS BY ELIG CLIN: HCPCS

## 2025-07-08 NOTE — PROGRESS NOTES
Neurology Office Note    Date:7/8/2025        Patient Name:Lulu Werner     YOB: 1935     Age:89 y.o.    Reason for Visit:  hospital follow up      Chief Complaint: severe stenosis/occlusion of left carotid artery       Subjective     Lulu Werner is an 89 year old female with a past medical history significant for atrial fibrillation, hypertension, glaucoma, and CHF who presents to the interventional neurology clinic as a hospital follow up. Patient was originally seen by our service 5/26 for weakness with an NIH of 1. Her CTA from an OSH revealed an occluded left carotid artery. Initial MRI brain WO contrast negative for acute infarct. That night patient had gotten up to use the restroom and developed sudden onset aphasia, right sided weakenss and inability to follow commands. It was recommended for patient's systolic blood pressure be 180-200, IV fluids, 300mg of Plavix, and strict bedrest with patient lying flat. Repeat MRI brain WO contrast revealed acute left hemispheric strokes. Patient has been compliant with Aspirin 81mg and Plavix 75mg daily. She is still having expressive aphasia, improved from when she was seen in the hospital. It was discussed with patient and son at bedside the recommendation of doing a diagnostic cerebral angiogram for further evaluation of the left carotid artery. It was discussed with them that if the carotid artery is fully occluded then there is nothing we further we can do for it, but if it is still even a little bit open the recommendation would be to stent it as it would carry further risk of stroke. The risks and benefits of the procedure were explained to the patient. Risks include, but are not limited to, stroke, hematoma, hemorrhage, infection, nephrotoxicity, allergic reaction, bleeding, and dissection.  This is scheduled for Friday 7/12. Patient is still with expressive aphasia and right sided weakness. She denies any headaches, dizziness, numbness,

## 2025-07-08 NOTE — TELEPHONE ENCOUNTER
Spoke with patient and patient son in the office regarding procedure scheduled for 7/11/25 . Instructed patient to arrive at 8:00am.   Instructed patient to bring insurance info and photo ID.    Instructed patient to enter through the main entrance, turn left and go to the K Elevators. Take the K elevators up to the second floor and [facility] .    Informed patient to please note that your procedure is scheduled for 2-3 hours after arrival time. Please be prepared for possible changes or delays with procedure start time related to the unforeseen changes in the schedule or emergency procedures.    Informed patient that although they may be discharged the evening of your procedure, please be prepared to stay overnight. We politely ask that only 1-2 family members be with you in the room during your stay.   Informed patient that if discharged the evening, patient unable to drive home, so please arrange for transportation if necessary. Someone will need to stay with patient 24 hours post procedure for safety.     Please do not eat or drink anything after midnight, the night before your procedure. You may have water until midnight.     Confirmed no allergy to iodine, shellfish, or shrimp.     The morning of your procedure, take you usual medication with sips of water.    Please bring your medications in their original prescription bottles with you.    After the procedure, you will be unable to drive, please have transportation available.    . [facility]E Pre-Admission will try to contact you to confirm instructions, answer questions, and obtain your health information including medications, health history, and surgical history.    Mary Escobedo RN

## 2025-07-09 ENCOUNTER — HOSPITAL ENCOUNTER (OUTPATIENT)
Dept: PREADMISSION TESTING | Age: 89
Discharge: HOME | End: 2025-07-13

## 2025-07-09 VITALS — HEIGHT: 64 IN | BODY MASS INDEX: 21.51 KG/M2 | WEIGHT: 126 LBS

## 2025-07-09 NOTE — FLOWSHEET NOTE
Instructed pt to take K elevators up to second floor and [facility] directly to [facility] .      Their procedure is scheduled for 2-3 hours after your arrival.  Please be prepared for possible changes or delay with your procedure start time related to unforeseen changes in the schedule for emergency procedures.    Although pt may be discharged the evening of their procedure please be prepared to stay overnight.We polietly ask that on 1-2 family member(s)  be with you in the room during your stay.    Please do not eat or drinking anything after midnight the night before your procedure.  You may have water until midnight.      If you haven't had your cholesterol check within 30 days please don't eat for 12 hours before arrival time.     Do you have any allergies to iodine, shellfish, or shrimp please notify you Dr immediately.    The morning of procedure take your usual medication with sips of water including your Plavix, Effient, and Asa.     Don't take the following anticoagulant medication -(Eliquis) 48 hours prior to your procedure.     Don't take day of surgery Lanoxin or Digoxin, Diuretics (Water Pills), Glucophage or any other medication for diabetes.     If you are taking Coumadin/Warfarin check with your doctor to see when to stop taking this medications prior to your procedure.     Please bring medication in their original prescription bottles with you including inhalers.  If you are not admitted you will be given medications for your supply. If yo are not admitted as inpatient, you will be given medications from you own supply.     If you have sleep apnea and have CPAP machine please bring with you.    Peritoneal dialysis patient only bring your dialysis supplies with you.    Female patient only and menstruating or possible pregnant notify Dr Immediately..    After procedure  You will be unable to drive.  Please have someone or arrange transportation available.    PLEASE TAKE BLOOD

## 2025-07-09 NOTE — PROGRESS NOTES
Son answered question in regards to PAT phone called he needs to call back because he didn't have his mom medication list with him for us to review medication.  I did instruct that patient takes ASA and Plavix on day of am of surgery per Dr Floyd office

## 2025-07-09 NOTE — PROGRESS NOTES
PAT call attempted, patient unavailable, left message to please call us back at your earliest convenience; [phone]

## 2025-07-10 ASSESSMENT — ENCOUNTER SYMPTOMS
VOMITING: 0
NAUSEA: 0
SORE THROAT: 0
ABDOMINAL PAIN: 0
COUGH: 0
RHINORRHEA: 0

## 2025-07-10 ASSESSMENT — VISUAL ACUITY: VA_NORMAL: 1

## 2025-07-11 ENCOUNTER — HOSPITAL ENCOUNTER (OUTPATIENT)
Dept: GENERAL RADIOLOGY | Age: 89
Setting detail: OBSERVATION
Discharge: HOME | End: 2025-07-11
Attending: PSYCHIATRY & NEUROLOGY | Admitting: STUDENT IN AN ORGANIZED HEALTH CARE EDUCATION/TRAINING PROGRAM
Payer: MEDICARE

## 2025-07-11 ENCOUNTER — ANESTHESIA EVENT (OUTPATIENT)
Age: 89
End: 2025-07-11
Payer: MEDICARE

## 2025-07-11 ENCOUNTER — ANESTHESIA (OUTPATIENT)
Age: 89
End: 2025-07-11
Payer: MEDICARE

## 2025-07-11 ENCOUNTER — HOSPITAL ENCOUNTER (OUTPATIENT)
Age: 89
Setting detail: OBSERVATION
Discharge: HOME | End: 2025-07-14
Attending: PSYCHIATRY & NEUROLOGY
Payer: MEDICARE

## 2025-07-11 DIAGNOSIS — Z86.73 HISTORY OF CVA (CEREBROVASCULAR ACCIDENT): Primary | ICD-10-CM

## 2025-07-11 DIAGNOSIS — I65.29 CAROTID STENOSIS: ICD-10-CM

## 2025-07-11 DIAGNOSIS — E87.6 HYPOKALEMIA: ICD-10-CM

## 2025-07-11 DIAGNOSIS — R47.01 EXPRESSIVE APHASIA: ICD-10-CM

## 2025-07-11 PROBLEM — I63.9 CEREBROVASCULAR ACCIDENT (CVA) (HCC): Status: RESOLVED | Noted: 2025-05-28 | Resolved: 2025-07-11

## 2025-07-11 PROBLEM — N30.00 ACUTE CYSTITIS WITHOUT HEMATURIA: Status: RESOLVED | Noted: 2025-05-30 | Resolved: 2025-07-11

## 2025-07-11 PROBLEM — I48.20 ATRIAL FIBRILLATION, CHRONIC (HCC): Status: ACTIVE | Noted: 2024-02-02

## 2025-07-11 PROBLEM — I65.22 INTERNAL CAROTID ARTERY STENOSIS, LEFT: Status: ACTIVE | Noted: 2025-07-11

## 2025-07-11 PROBLEM — R06.09 DOE (DYSPNEA ON EXERTION): Status: RESOLVED | Noted: 2024-02-02 | Resolved: 2025-07-11

## 2025-07-11 PROBLEM — R06.02 SOB (SHORTNESS OF BREATH): Status: ACTIVE | Noted: 2025-07-11

## 2025-07-11 PROBLEM — R06.02 SHORTNESS OF BREATH: Status: ACTIVE | Noted: 2025-07-11

## 2025-07-11 PROBLEM — I63.9 ACUTE ISCHEMIC STROKE (HCC): Status: RESOLVED | Noted: 2025-05-28 | Resolved: 2025-07-11

## 2025-07-11 PROBLEM — I48.91 ATRIAL FIBRILLATION WITH RVR (HCC): Status: RESOLVED | Noted: 2023-06-24 | Resolved: 2025-07-11

## 2025-07-11 PROBLEM — I63.9 ACUTE CEREBROVASCULAR ACCIDENT (CVA) (HCC): Status: RESOLVED | Noted: 2025-06-03 | Resolved: 2025-07-11

## 2025-07-11 PROBLEM — R00.2 INTERMITTENT PALPITATIONS: Status: RESOLVED | Noted: 2024-02-02 | Resolved: 2025-07-11

## 2025-07-11 PROBLEM — I63.9 ACUTE CVA (CEREBROVASCULAR ACCIDENT) (HCC): Status: RESOLVED | Noted: 2025-05-30 | Resolved: 2025-07-11

## 2025-07-11 LAB
ABO GROUP BLD: NORMAL
ANION GAP SERPL CALC-SCNC: 14 MEQ/L (ref 8–16)
APTT PPP: 29.5 SECONDS (ref 22–38)
BASE EXCESS BLDA CALC-SCNC: -1.6 MMOL/L (ref -2–3)
BUN SERPL-MCNC: 18 MG/DL (ref 8–23)
CALCIUM SERPL-MCNC: 9.1 MG/DL (ref 8.8–10.2)
CHLORIDE SERPL-SCNC: 101 MEQ/L (ref 98–111)
CO2 SERPL-SCNC: 19 MEQ/L (ref 22–29)
COLLECTED BY:: ABNORMAL
CREAT SERPL-MCNC: 1 MG/DL (ref 0.5–0.9)
DEPRECATED RDW RBC AUTO: 63.9 FL (ref 35–45)
DEVICE: ABNORMAL
ECHO BSA: 1.68 M2
EKG Q-T INTERVAL: 406 MS
EKG QRS DURATION: 90 MS
EKG QTC CALCULATION (BAZETT): 453 MS
EKG R AXIS: 149 DEGREES
EKG T AXIS: 67 DEGREES
EKG VENTRICULAR RATE: 75 BPM
ERYTHROCYTE [DISTWIDTH] IN BLOOD BY AUTOMATED COUNT: 19 % (ref 11.5–14.5)
FERRITIN SERPL IA-MCNC: 105 NG/ML (ref 13–150)
GFR SERPL CREATININE-BSD FRML MDRD: 54 ML/MIN/1.73M2
GLUCOSE SERPL-MCNC: 107 MG/DL (ref 74–109)
HCO3 BLDA-SCNC: 23 MMOL/L (ref 23–28)
HCT VFR BLD AUTO: 37 % (ref 37–47)
HGB BLD-MCNC: 11.9 GM/DL (ref 12–16)
IAT IGG-SP REAG SERPL QL: NORMAL
INR PPP: 1.19 (ref 0.85–1.13)
IRON SATN MFR SERPL: 18 % (ref 20–50)
IRON SERPL-MCNC: 61 UG/DL (ref 37–145)
MCH RBC QN AUTO: 29.4 PG (ref 26–33)
MCHC RBC AUTO-ENTMCNC: 32.2 GM/DL (ref 32.2–35.5)
MCV RBC AUTO: 91.4 FL (ref 81–99)
P2Y12 ASSAY: 127 PRU (ref 180–376)
PCO2 TEMP ADJ BLDMV: 37 MMHG (ref 41–51)
PH BLDMV: 7.4 [PH] (ref 7.31–7.41)
PLATELET # BLD AUTO: 226 THOU/MM3 (ref 130–400)
PMV BLD AUTO: 9.3 FL (ref 9.4–12.4)
PO2 BLDMV: 38 MMHG (ref 25–40)
POTASSIUM SERPL-SCNC: 4.2 MEQ/L (ref 3.5–5.2)
PROTHROMBIN TIME: 13.6 SECONDS (ref 10–13.5)
RBC # BLD AUTO: 4.05 MILL/MM3 (ref 4.2–5.4)
RH BLD: NORMAL
SAO2 % BLDMV: 72 %
SITE: ABNORMAL
SODIUM SERPL-SCNC: 134 MEQ/L (ref 135–145)
TIBC SERPL-MCNC: 333 UG/DL (ref 171–450)
VENTILATION MODE VENT: ABNORMAL
WBC # BLD AUTO: 7 THOU/MM3 (ref 4.8–10.8)

## 2025-07-11 PROCEDURE — 82728 ASSAY OF FERRITIN: CPT

## 2025-07-11 PROCEDURE — 86885 COOMBS TEST INDIRECT QUAL: CPT

## 2025-07-11 PROCEDURE — 96372 THER/PROPH/DIAG INJ SC/IM: CPT

## 2025-07-11 PROCEDURE — 2580000003 HC RX 258

## 2025-07-11 PROCEDURE — 85576 BLOOD PLATELET AGGREGATION: CPT

## 2025-07-11 PROCEDURE — 93005 ELECTROCARDIOGRAM TRACING: CPT

## 2025-07-11 PROCEDURE — 71045 X-RAY EXAM CHEST 1 VIEW: CPT

## 2025-07-11 PROCEDURE — 2500000003 HC RX 250 WO HCPCS

## 2025-07-11 PROCEDURE — 94760 N-INVAS EAR/PLS OXIMETRY 1: CPT

## 2025-07-11 PROCEDURE — G0378 HOSPITAL OBSERVATION PER HR: HCPCS

## 2025-07-11 PROCEDURE — 36222 PLACE CATH CAROTID/INOM ART: CPT

## 2025-07-11 PROCEDURE — 7100000010 HC PHASE II RECOVERY - FIRST 15 MIN: Performed by: PSYCHIATRY & NEUROLOGY

## 2025-07-11 PROCEDURE — 80048 BASIC METABOLIC PNL TOTAL CA: CPT

## 2025-07-11 PROCEDURE — 85730 THROMBOPLASTIN TIME PARTIAL: CPT

## 2025-07-11 PROCEDURE — 36223 PLACE CATH CAROTID/INOM ART: CPT | Performed by: PSYCHIATRY & NEUROLOGY

## 2025-07-11 PROCEDURE — 86901 BLOOD TYPING SEROLOGIC RH(D): CPT

## 2025-07-11 PROCEDURE — 36225 PLACE CATH SUBCLAVIAN ART: CPT

## 2025-07-11 PROCEDURE — 6360000004 HC RX CONTRAST MEDICATION: Performed by: PSYCHIATRY & NEUROLOGY

## 2025-07-11 PROCEDURE — 99223 1ST HOSP IP/OBS HIGH 75: CPT

## 2025-07-11 PROCEDURE — 7100000011 HC PHASE II RECOVERY - ADDTL 15 MIN: Performed by: PSYCHIATRY & NEUROLOGY

## 2025-07-11 PROCEDURE — 6360000002 HC RX W HCPCS: Performed by: PSYCHIATRY & NEUROLOGY

## 2025-07-11 PROCEDURE — 2700000000 HC OXYGEN THERAPY PER DAY

## 2025-07-11 PROCEDURE — G0379 DIRECT REFER HOSPITAL OBSERV: HCPCS

## 2025-07-11 PROCEDURE — 85610 PROTHROMBIN TIME: CPT

## 2025-07-11 PROCEDURE — 6370000000 HC RX 637 (ALT 250 FOR IP)

## 2025-07-11 PROCEDURE — 6360000002 HC RX W HCPCS

## 2025-07-11 PROCEDURE — 36223 PLACE CATH CAROTID/INOM ART: CPT

## 2025-07-11 PROCEDURE — 82803 BLOOD GASES ANY COMBINATION: CPT

## 2025-07-11 PROCEDURE — 36415 COLL VENOUS BLD VENIPUNCTURE: CPT

## 2025-07-11 PROCEDURE — 83540 ASSAY OF IRON: CPT

## 2025-07-11 PROCEDURE — 86900 BLOOD TYPING SEROLOGIC ABO: CPT

## 2025-07-11 PROCEDURE — 36221 PLACE CATH THORACIC AORTA: CPT

## 2025-07-11 PROCEDURE — 36225 PLACE CATH SUBCLAVIAN ART: CPT | Performed by: PSYCHIATRY & NEUROLOGY

## 2025-07-11 PROCEDURE — 85027 COMPLETE CBC AUTOMATED: CPT

## 2025-07-11 PROCEDURE — 83550 IRON BINDING TEST: CPT

## 2025-07-11 RX ORDER — ASPIRIN 81 MG/1
81 TABLET, CHEWABLE ORAL DAILY
Status: DISCONTINUED | OUTPATIENT
Start: 2025-07-12 | End: 2025-07-14 | Stop reason: HOSPADM

## 2025-07-11 RX ORDER — CLOPIDOGREL BISULFATE 75 MG/1
75 TABLET ORAL DAILY
Status: DISCONTINUED | OUTPATIENT
Start: 2025-07-12 | End: 2025-07-11

## 2025-07-11 RX ORDER — SODIUM CHLORIDE 0.9 % (FLUSH) 0.9 %
5-40 SYRINGE (ML) INJECTION EVERY 12 HOURS SCHEDULED
Status: DISCONTINUED | OUTPATIENT
Start: 2025-07-11 | End: 2025-07-11

## 2025-07-11 RX ORDER — DILTIAZEM HYDROCHLORIDE 180 MG/1
180 CAPSULE, COATED, EXTENDED RELEASE ORAL DAILY
Status: DISCONTINUED | OUTPATIENT
Start: 2025-07-12 | End: 2025-07-14 | Stop reason: HOSPADM

## 2025-07-11 RX ORDER — ATORVASTATIN CALCIUM 80 MG/1
80 TABLET, FILM COATED ORAL NIGHTLY
Status: DISCONTINUED | OUTPATIENT
Start: 2025-07-11 | End: 2025-07-14 | Stop reason: HOSPADM

## 2025-07-11 RX ORDER — SODIUM CHLORIDE 0.9 % (FLUSH) 0.9 %
5-40 SYRINGE (ML) INJECTION PRN
Status: DISCONTINUED | OUTPATIENT
Start: 2025-07-11 | End: 2025-07-11

## 2025-07-11 RX ORDER — ONDANSETRON 2 MG/ML
4 INJECTION INTRAMUSCULAR; INTRAVENOUS EVERY 6 HOURS PRN
Status: DISCONTINUED | OUTPATIENT
Start: 2025-07-11 | End: 2025-07-14 | Stop reason: HOSPADM

## 2025-07-11 RX ORDER — POTASSIUM CHLORIDE 1500 MG/1
10 TABLET, EXTENDED RELEASE ORAL DAILY
Status: DISCONTINUED | OUTPATIENT
Start: 2025-07-11 | End: 2025-07-14

## 2025-07-11 RX ORDER — LATANOPROST 50 UG/ML
1 SOLUTION/ DROPS OPHTHALMIC NIGHTLY
Status: DISCONTINUED | OUTPATIENT
Start: 2025-07-11 | End: 2025-07-14 | Stop reason: HOSPADM

## 2025-07-11 RX ORDER — BUMETANIDE 0.25 MG/ML
1 INJECTION, SOLUTION INTRAMUSCULAR; INTRAVENOUS 2 TIMES DAILY
Status: DISCONTINUED | OUTPATIENT
Start: 2025-07-12 | End: 2025-07-13

## 2025-07-11 RX ORDER — SODIUM CHLORIDE 9 MG/ML
INJECTION, SOLUTION INTRAVENOUS PRN
Status: DISCONTINUED | OUTPATIENT
Start: 2025-07-11 | End: 2025-07-11

## 2025-07-11 RX ORDER — BUMETANIDE 1 MG/1
1 TABLET ORAL DAILY
Status: DISCONTINUED | OUTPATIENT
Start: 2025-07-11 | End: 2025-07-14 | Stop reason: HOSPADM

## 2025-07-11 RX ORDER — MAGNESIUM SULFATE IN WATER 40 MG/ML
2000 INJECTION, SOLUTION INTRAVENOUS PRN
Status: DISCONTINUED | OUTPATIENT
Start: 2025-07-11 | End: 2025-07-14 | Stop reason: HOSPADM

## 2025-07-11 RX ORDER — HYDRALAZINE HYDROCHLORIDE 20 MG/ML
10 INJECTION INTRAMUSCULAR; INTRAVENOUS EVERY 6 HOURS PRN
Status: DISCONTINUED | OUTPATIENT
Start: 2025-07-11 | End: 2025-07-14 | Stop reason: HOSPADM

## 2025-07-11 RX ORDER — ACETAMINOPHEN 325 MG/1
650 TABLET ORAL EVERY 4 HOURS PRN
Status: DISCONTINUED | OUTPATIENT
Start: 2025-07-11 | End: 2025-07-11

## 2025-07-11 RX ORDER — ENOXAPARIN SODIUM 100 MG/ML
40 INJECTION SUBCUTANEOUS EVERY 24 HOURS
Status: DISCONTINUED | OUTPATIENT
Start: 2025-07-11 | End: 2025-07-13

## 2025-07-11 RX ORDER — HEPARIN SODIUM 5000 [USP'U]/ML
INJECTION, SOLUTION INTRAVENOUS; SUBCUTANEOUS PRN
Status: COMPLETED | OUTPATIENT
Start: 2025-07-11 | End: 2025-07-11

## 2025-07-11 RX ORDER — POTASSIUM CHLORIDE 7.45 MG/ML
10 INJECTION INTRAVENOUS PRN
Status: DISCONTINUED | OUTPATIENT
Start: 2025-07-11 | End: 2025-07-14 | Stop reason: HOSPADM

## 2025-07-11 RX ORDER — SODIUM CHLORIDE 0.9 % (FLUSH) 0.9 %
5-40 SYRINGE (ML) INJECTION EVERY 12 HOURS SCHEDULED
Status: DISCONTINUED | OUTPATIENT
Start: 2025-07-11 | End: 2025-07-14 | Stop reason: HOSPADM

## 2025-07-11 RX ORDER — METOPROLOL TARTRATE 100 MG/1
100 TABLET ORAL 2 TIMES DAILY
Status: DISCONTINUED | OUTPATIENT
Start: 2025-07-11 | End: 2025-07-14 | Stop reason: HOSPADM

## 2025-07-11 RX ORDER — SODIUM CHLORIDE 9 MG/ML
INJECTION, SOLUTION INTRAVENOUS PRN
Status: DISCONTINUED | OUTPATIENT
Start: 2025-07-11 | End: 2025-07-14 | Stop reason: HOSPADM

## 2025-07-11 RX ORDER — ONDANSETRON 4 MG/1
4 TABLET, ORALLY DISINTEGRATING ORAL EVERY 8 HOURS PRN
Status: DISCONTINUED | OUTPATIENT
Start: 2025-07-11 | End: 2025-07-14 | Stop reason: HOSPADM

## 2025-07-11 RX ORDER — ACETAMINOPHEN 325 MG/1
650 TABLET ORAL EVERY 6 HOURS PRN
Status: DISCONTINUED | OUTPATIENT
Start: 2025-07-11 | End: 2025-07-14 | Stop reason: HOSPADM

## 2025-07-11 RX ORDER — POLYETHYLENE GLYCOL 3350 17 G/17G
17 POWDER, FOR SOLUTION ORAL DAILY PRN
Status: DISCONTINUED | OUTPATIENT
Start: 2025-07-11 | End: 2025-07-14 | Stop reason: HOSPADM

## 2025-07-11 RX ORDER — BUMETANIDE 0.25 MG/ML
2 INJECTION, SOLUTION INTRAMUSCULAR; INTRAVENOUS ONCE
Status: COMPLETED | OUTPATIENT
Start: 2025-07-11 | End: 2025-07-11

## 2025-07-11 RX ORDER — ACETAMINOPHEN 650 MG/1
650 SUPPOSITORY RECTAL EVERY 6 HOURS PRN
Status: DISCONTINUED | OUTPATIENT
Start: 2025-07-11 | End: 2025-07-14 | Stop reason: HOSPADM

## 2025-07-11 RX ORDER — SODIUM CHLORIDE 0.9 % (FLUSH) 0.9 %
5-40 SYRINGE (ML) INJECTION PRN
Status: DISCONTINUED | OUTPATIENT
Start: 2025-07-11 | End: 2025-07-14 | Stop reason: HOSPADM

## 2025-07-11 RX ORDER — POTASSIUM CHLORIDE 1500 MG/1
40 TABLET, EXTENDED RELEASE ORAL PRN
Status: DISCONTINUED | OUTPATIENT
Start: 2025-07-11 | End: 2025-07-14 | Stop reason: HOSPADM

## 2025-07-11 RX ADMIN — POTASSIUM CHLORIDE 10 MEQ: 1500 TABLET, EXTENDED RELEASE ORAL at 18:18

## 2025-07-11 RX ADMIN — SODIUM CHLORIDE, PRESERVATIVE FREE 10 ML: 5 INJECTION INTRAVENOUS at 21:32

## 2025-07-11 RX ADMIN — ENOXAPARIN SODIUM 40 MG: 100 INJECTION SUBCUTANEOUS at 18:18

## 2025-07-11 RX ADMIN — SODIUM CHLORIDE: 0.9 INJECTION, SOLUTION INTRAVENOUS at 08:43

## 2025-07-11 RX ADMIN — HEPARIN SODIUM 5000 UNITS: 5000 INJECTION, SOLUTION INTRAVENOUS; SUBCUTANEOUS at 10:27

## 2025-07-11 RX ADMIN — ATORVASTATIN CALCIUM 80 MG: 80 TABLET, FILM COATED ORAL at 21:32

## 2025-07-11 RX ADMIN — BUMETANIDE 2 MG: 0.25 INJECTION INTRAMUSCULAR; INTRAVENOUS at 18:18

## 2025-07-11 RX ADMIN — METOPROLOL 100 MG: 100 TABLET ORAL at 21:32

## 2025-07-11 RX ADMIN — LIDOCAINE HYDROCHLORIDE 8 ML: 20 INJECTION, SOLUTION INFILTRATION; PERINEURAL at 10:24

## 2025-07-11 RX ADMIN — LATANOPROST 1 DROP: 50 SOLUTION OPHTHALMIC at 21:31

## 2025-07-11 RX ADMIN — IOHEXOL 300 ML: 300 INJECTION, SOLUTION INTRAVENOUS at 11:17

## 2025-07-11 ASSESSMENT — PAIN SCALES - GENERAL: PAINLEVEL_OUTOF10: 0

## 2025-07-11 ASSESSMENT — PAIN SCALES - WONG BAKER: WONGBAKER_NUMERICALRESPONSE: NO HURT

## 2025-07-11 NOTE — PROGRESS NOTES
0740 Pt arrived via wheelchair, son with pt. Admission in progress.   0810 Pt answers some questions appropriately, pt has expressive aphasia. Follows commands appropriately. Bilateral hand grasp weak, right weaker than left. Bilateral pedal push/pull weak, right weaker than left. Fingertips and toes purple (Son states that this is typical for pt)  0845 Neuro PA in to see pt.  1003 Pt to cath lab via bed with cath lab staff.   1123 Pt returned from cath lab, monitor attached.   1125 Pt complains of shortness of breath, tachypnea observed,  bed placed in reverse trendelenburg. Difficult to obtain pulse ox with good pleth (fingertips and toes purple and cool), pt's right hand placed in warm blanket.   1150 Pt continues to complain of some shortness of breath. Lips pink, slight audible wheeze, lungs diminished throughout. Placed on 2 L/in O2 per NC.   1155 Message sent to David Allen re: pt complaints of shortness of breath.   1200 Dr. Floyd in to evaluate pt.   1354 APRIL Morocho in to see pt.   1425 Pt sitting up in bed. Eating lunch without difficulty.   1438 Report called to Sarah BELTRAN  1540 Pt transported to 8A 17 via bed with transport. Belongings with pt, son to take pt's walker home. Pt remains tachypneic at times, but states that SOB is better.  Skin pink, warm, dry.

## 2025-07-11 NOTE — PLAN OF CARE
Problem: Safety - Adult  Goal: Free from fall injury  Outcome: Progressing     Problem: Chronic Conditions and Co-morbidities  Goal: Patient's chronic conditions and co-morbidity symptoms are monitored and maintained or improved  Outcome: Progressing  Flowsheets (Taken 7/11/2025 1625)  Care Plan - Patient's Chronic Conditions and Co-Morbidity Symptoms are Monitored and Maintained or Improved: Monitor and assess patient's chronic conditions and comorbid symptoms for stability, deterioration, or improvement     Problem: Discharge Planning  Goal: Discharge to home or other facility with appropriate resources  Outcome: Progressing  Flowsheets (Taken 7/11/2025 1625)  Discharge to home or other facility with appropriate resources: Identify barriers to discharge with patient and caregiver

## 2025-07-11 NOTE — H&P
Hospitalist History & Physical     Patient: Lulu Werner 89 y.o. female : 1935  Date of Admission: 2025  CODE STATUS: Full Code    Date of Service: Pt seen/examined on 25 and Admitted to Observation with expected LOS less than two midnights due to medical therapy.     ASSESSMENT AND PLAN  Active Problems:     Shortness of breath  Developed SOB post procedure  BMP: minimal hyponatremia. CBC: chronic anemia. BNP: pending.   EKG: A fib without RVR. Non specific T wave abnormality noted.    CXR completed shows increased interstital markings bilaterally that could represent pulmonary edema.  Patient has been holding her Bumex for the last month. Patient also received about 1 L during procedure as well as contrast.  Bumex 2 mg IV given today. Start 1 mg BID tomorrow. Restart PO Bumex on discharge.  Consider Ipratropium if Patient remains short of breath.      Chronic heart failure with preserved ejection fraction, evidence of decompensation  Patient presents with symptoms and evidence indicating volume overload and decompensation of chronic congestive heart failure   Most likely due to holding bumex and fluids/contrast given during procedure.  Last Echo 25: Normal left ventricular systolic function. EF by visual approximation is 60%. Left ventricle size is normal. Normal wall thickness. Normal wall motion.  Admission weight 137 lbs  Home med regimen: metoprolol, Bumex  Continue home meds. Strict I/O, daily standing weights, 1.5L fluid restriction, Na-resticted diet.     Atrial fibrillation, chronic (HCC)   Presence of Watchman left atrial appendage closure device  Managed at home with metoprolol, diltiazem.   See above for lab and imaging findings.  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 Sequela of expressive aphasia 
Extraocular movements intact bilaterally. Pupils equal round and reactive to light bilaterally.  CN V: Facial sensation is normal.  CN VII:  Right: There is central facial weakness.  Left: There is no facial weakness.  CN VIII: Hearing is normal.  CN IX, X: Palate elevates symmetrically  CN XI: Shoulder shrug strength is normal.  CN XII: Tongue midline without atrophy or fasciculations.    Motor  Normal muscle bulk throughout. Normal muscle tone.  Motor strength:  RUE: 4-/5  LUE: 4/5  RLE: 3 to 3+/5  LLE: 3+ to 4-/5.    Sensory  Light touch is normal in upper and lower extremities.   Denies sensory deficit to light touch bilateral upper and lower extremities. .    Coordination  Right: Finger-to-nose normal. Heel-to-shin normal.Left: Finger-to-nose normal. Heel-to-shin normal.  Somewhat limited secondary to difficulty with complex command following/receptive aphasia. .      Mallampati Score: II          ASA Score: 3       Labs/Imaging/Diagnostics     Imaging:  No results found.     Assessment and Plan:        Severe stenosis/occlusion of left carotid artery:  CTA head and neck 5/26 - severe stenosis/occlusion of left carotid artery.  MRI brain WO contrast 5/27 - left hemispheric strokes.  Patient to continue Aspirin 81mg and Plavix 75mg daily.  Discussed the recommendation of doing a diagnostic cerebral angiogram with patient and son for further evaluation of the left carotid artery. It was discussed that if the artery is fully closed then there is nothing further we can do, but if it is open even just a little, it would be recommended to stent it to prevent further strokes coming from that artery. The risks and benefits of the procedure were explained to the patient. Risks include, but are not limited to, stroke, hematoma, hemorrhage, infection, nephrotoxicity, allergic reaction, bleeding, and dissection.  They are agreeable to proceed. Procedure scheduled for today at 1000 with Dr. Floyd.   Remain NPO until

## 2025-07-11 NOTE — PROCEDURES
PROCEDURE NOTE  Date: 7/11/2025   Name: Lulu Werner  YOB: 1935      PREOPERATIVE DIAGNOSIS: Stroke/left ICA occlusion    POSTOPERATIVE DIAGNOSIS: Same    SURGEONS:  Art Floyd MD    ANESTHESIA: Lidocaine 1% for local anesthesia.     MEDICATIONS: Heparin 4000 units/liter was used for saline flushes    ESTIMATED BLOOD LOSS: 10 cc    COMPLICATIONS: None    HEMOSTASIS: 6 [place] Angioseal and the arteriotomy site was covered with a sterile dressing    PROCEDURES PERFORMED:    1. Four vessel diagnostic cerebral angiogram    CATHETERIZATION OF THE FOLLOWING VESSELS:    1. Right brachiocephalic artery  2. Left common carotid artery  3. Left subclavian artery    ANGIOGRAPHY AND INTERPRETATION OF THE FOLLOWING IMAGES:    1. Right brachiocephalic artery - cervical station - lateral and oblique angiograms  2. Right brachiocephalic artery - cranial station - AP and lateral angiograms  3.  Left common carotid artery-cervical station - AP and lateral angiograms  4. Left common carotid artery - cranial station - AP, Oblique and lateral angiograms  5. Left subclavian artery -cervical- AP and lateral angiogams  6.Limited right common femoral artery - AP angiogram    INDICATIONS: Ms. Guerrier is a 89-year-old right-handed woman with a history of recent left MCA stroke and CT angiogram demonstrating left ICA occlusion. A catheter cerebral angiogram is being performed for further evaluation. The purpose, alternative modalities, risks, and benefits of the procedure were discussed with the patient family and an informed consent was signed.    DESCRIPTION OF THE PROCEDURE AND FINDINGS: The patient was brought to the angiography suite and placed supine on the table. Both groins were then prepped and draped in the usual sterile fashion. Bony landmarks were identified, and an imaginary line between the pubic tubercle and the anterior superior iliac spine was identified on the right. This corresponds to the right
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
## 2025-07-11 NOTE — PLAN OF CARE
Patient s/p diagnostic cerebral angiogram with conscious sedation today with Dr. Floyd. Findings include: right ICA atherosclerotic plaque with 50% stenosis and a normal, recanalized left ICA without evidence of stenosis. Angioseal placed, recommend three hours of strict bedrest following the procedure. Post-operatively, patient complaining of shortness of breath with audible wheezing, SpO2 88-90% on RA. Patient placed on 2L NC with mild improvement to 92%. STAT CXR ordered. Admitting hospitalist team notified of need for evaluation/consultation due to concern for fluid overload during the procedure.     P2Y12 revealed inhibitor effect, however, no longer needed due to normal/recanalized L ICA. Patient should continue ASA. No need for additional neuro interventional clinic follow up at this time.     Neurology following. Please call with any additional questions or concerns.     This patient was seen and evaluated with Dr. Floyd and he is in agreement with the assessment and plan.    Electronically signed by David Cormier PA-C on 7/11/25 at 2:52 PM EDT

## 2025-07-11 NOTE — PROGRESS NOTES
0945 Verify consent, H&P, and/or immediate pre-procedure note completed  1000 Patient received in cath lab for procedure family taken to waiting room.   1000 Pre-procedure peripheral pulse,  right pedal 1, right posterior tibial 1, left pedal 1, left posterior tibial 1  1015 Patient prepped for procedure  1024 Lidocaine time  1025 Access obtained at micro puncture 6Fr sheath advanced right fem. Art. 1 stick   1030 Right Common Carotid angiogram, mult images taken   1039 Right ICA angiogram, mult. Images taken   1043 Left common carotid angiogram, mult. Images taken   1049 Left ICA angiogram, mult. Images taken   1059 physician scrubbed out to review images.   1106 Sheath-removed, intact, Angioseal used for closure device.   1107 Procedure completed; patient tolerated well.  1110 Femoral site; area soft to touch with no bleeding noted. Hemostasis achieved.  1112 Post-procedure peripheral pulse,  right pedal 1, right posterior tibial 1, left pedal 1, left posterior tibial 1  1113 Femoral dressing remains dry and intact with area soft.   1115 Case end time  1115 Patient on bed, patient exits procedural suite  1116 Patient take to 2E in stable condition, family updated per Dr. Floyd     Xray time- 17.3minutes   202mGy    20DAP  Total contrast- 77ml's

## 2025-07-12 ENCOUNTER — APPOINTMENT (OUTPATIENT)
Dept: GENERAL RADIOLOGY | Age: 89
End: 2025-07-12
Attending: PSYCHIATRY & NEUROLOGY
Payer: MEDICARE

## 2025-07-12 LAB
ALBUMIN SERPL BCG-MCNC: 3.7 G/DL (ref 3.4–4.9)
ALP SERPL-CCNC: 75 U/L (ref 38–126)
ALT SERPL W/O P-5'-P-CCNC: 28 U/L (ref 10–35)
ANION GAP SERPL CALC-SCNC: 13 MEQ/L (ref 8–16)
AST SERPL-CCNC: 28 U/L (ref 10–35)
BASOPHILS ABSOLUTE: 0 THOU/MM3 (ref 0–0.1)
BASOPHILS NFR BLD AUTO: 0.7 %
BILIRUB CONJ SERPL-MCNC: 0.5 MG/DL (ref 0–0.2)
BILIRUB SERPL-MCNC: 0.9 MG/DL (ref 0.3–1.2)
BUN SERPL-MCNC: 18 MG/DL (ref 8–23)
CALCIUM SERPL-MCNC: 9 MG/DL (ref 8.8–10.2)
CHLORIDE SERPL-SCNC: 100 MEQ/L (ref 98–111)
CO2 SERPL-SCNC: 22 MEQ/L (ref 22–29)
CREAT SERPL-MCNC: 1.1 MG/DL (ref 0.5–0.9)
DEPRECATED RDW RBC AUTO: 61.8 FL (ref 35–45)
EOSINOPHIL NFR BLD AUTO: 1.6 %
EOSINOPHILS ABSOLUTE: 0.1 THOU/MM3 (ref 0–0.4)
ERYTHROCYTE [DISTWIDTH] IN BLOOD BY AUTOMATED COUNT: 18.6 % (ref 11.5–14.5)
GFR SERPL CREATININE-BSD FRML MDRD: 48 ML/MIN/1.73M2
GLUCOSE SERPL-MCNC: 113 MG/DL (ref 74–109)
HCT VFR BLD AUTO: 36.8 % (ref 37–47)
HGB BLD-MCNC: 11.7 GM/DL (ref 12–16)
IMM GRANULOCYTES # BLD AUTO: 0.03 THOU/MM3 (ref 0–0.07)
IMM GRANULOCYTES NFR BLD AUTO: 0.5 %
LYMPHOCYTES ABSOLUTE: 0.6 THOU/MM3 (ref 1–4.8)
LYMPHOCYTES NFR BLD AUTO: 10.3 %
MAGNESIUM SERPL-MCNC: 2.1 MG/DL (ref 1.6–2.6)
MCH RBC QN AUTO: 28.9 PG (ref 26–33)
MCHC RBC AUTO-ENTMCNC: 31.8 GM/DL (ref 32.2–35.5)
MCV RBC AUTO: 90.9 FL (ref 81–99)
MONOCYTES ABSOLUTE: 0.5 THOU/MM3 (ref 0.4–1.3)
MONOCYTES NFR BLD AUTO: 9.4 %
NEUTROPHILS ABSOLUTE: 4.4 THOU/MM3 (ref 1.8–7.7)
NEUTROPHILS NFR BLD AUTO: 77.5 %
NRBC BLD AUTO-RTO: 0 /100 WBC
NT-PROBNP SERPL IA-MCNC: 6347 PG/ML (ref 0–449)
PLATELET # BLD AUTO: 244 THOU/MM3 (ref 130–400)
PMV BLD AUTO: 9 FL (ref 9.4–12.4)
POTASSIUM SERPL-SCNC: 3.9 MEQ/L (ref 3.5–5.2)
PROT SERPL-MCNC: 5.5 G/DL (ref 6.4–8.3)
RBC # BLD AUTO: 4.05 MILL/MM3 (ref 4.2–5.4)
SODIUM SERPL-SCNC: 135 MEQ/L (ref 135–145)
TROPONIN, HIGH SENSITIVITY: 86 NG/L (ref 0–12)
WBC # BLD AUTO: 5.7 THOU/MM3 (ref 4.8–10.8)

## 2025-07-12 PROCEDURE — 80053 COMPREHEN METABOLIC PANEL: CPT

## 2025-07-12 PROCEDURE — 85025 COMPLETE CBC W/AUTO DIFF WBC: CPT

## 2025-07-12 PROCEDURE — 99232 SBSQ HOSP IP/OBS MODERATE 35: CPT | Performed by: OPHTHALMOLOGY

## 2025-07-12 PROCEDURE — 96374 THER/PROPH/DIAG INJ IV PUSH: CPT

## 2025-07-12 PROCEDURE — 84484 ASSAY OF TROPONIN QUANT: CPT

## 2025-07-12 PROCEDURE — 83735 ASSAY OF MAGNESIUM: CPT

## 2025-07-12 PROCEDURE — 6360000002 HC RX W HCPCS

## 2025-07-12 PROCEDURE — G0378 HOSPITAL OBSERVATION PER HR: HCPCS

## 2025-07-12 PROCEDURE — 6370000000 HC RX 637 (ALT 250 FOR IP)

## 2025-07-12 PROCEDURE — 96376 TX/PRO/DX INJ SAME DRUG ADON: CPT

## 2025-07-12 PROCEDURE — 83880 ASSAY OF NATRIURETIC PEPTIDE: CPT

## 2025-07-12 PROCEDURE — 2500000003 HC RX 250 WO HCPCS

## 2025-07-12 PROCEDURE — 96372 THER/PROPH/DIAG INJ SC/IM: CPT

## 2025-07-12 PROCEDURE — 71046 X-RAY EXAM CHEST 2 VIEWS: CPT

## 2025-07-12 PROCEDURE — 36415 COLL VENOUS BLD VENIPUNCTURE: CPT

## 2025-07-12 PROCEDURE — 82248 BILIRUBIN DIRECT: CPT

## 2025-07-12 RX ADMIN — BUMETANIDE 1 MG: 0.25 INJECTION INTRAMUSCULAR; INTRAVENOUS at 08:53

## 2025-07-12 RX ADMIN — SODIUM CHLORIDE, PRESERVATIVE FREE 10 ML: 5 INJECTION INTRAVENOUS at 08:53

## 2025-07-12 RX ADMIN — SODIUM CHLORIDE, PRESERVATIVE FREE 10 ML: 5 INJECTION INTRAVENOUS at 21:36

## 2025-07-12 RX ADMIN — ATORVASTATIN CALCIUM 80 MG: 80 TABLET, FILM COATED ORAL at 21:19

## 2025-07-12 RX ADMIN — METOPROLOL 100 MG: 100 TABLET ORAL at 21:19

## 2025-07-12 RX ADMIN — ASPIRIN 81 MG: 81 TABLET, CHEWABLE ORAL at 08:53

## 2025-07-12 RX ADMIN — LATANOPROST 1 DROP: 50 SOLUTION OPHTHALMIC at 21:19

## 2025-07-12 RX ADMIN — METOPROLOL 100 MG: 100 TABLET ORAL at 08:53

## 2025-07-12 RX ADMIN — POTASSIUM CHLORIDE 10 MEQ: 1500 TABLET, EXTENDED RELEASE ORAL at 08:53

## 2025-07-12 RX ADMIN — ENOXAPARIN SODIUM 40 MG: 100 INJECTION SUBCUTANEOUS at 17:30

## 2025-07-12 RX ADMIN — DILTIAZEM HYDROCHLORIDE 180 MG: 180 CAPSULE, EXTENDED RELEASE ORAL at 08:53

## 2025-07-12 RX ADMIN — BUMETANIDE 1 MG: 0.25 INJECTION INTRAMUSCULAR; INTRAVENOUS at 17:30

## 2025-07-12 NOTE — PLAN OF CARE
Problem: Safety - Adult  Goal: Free from fall injury  7/12/2025 0600 by Monica Moore RN  Outcome: Progressing  7/11/2025 1640 by Sarah Wilks RN  Outcome: Progressing     Problem: Chronic Conditions and Co-morbidities  Goal: Patient's chronic conditions and co-morbidity symptoms are monitored and maintained or improved  7/12/2025 0600 by Monica Moore RN  Outcome: Progressing  7/11/2025 1640 by Sarah Wilks RN  Outcome: Progressing  Flowsheets (Taken 7/11/2025 1625)  Care Plan - Patient's Chronic Conditions and Co-Morbidity Symptoms are Monitored and Maintained or Improved: Monitor and assess patient's chronic conditions and comorbid symptoms for stability, deterioration, or improvement     Problem: Discharge Planning  Goal: Discharge to home or other facility with appropriate resources  7/12/2025 0600 by Monica Moore RN  Outcome: Progressing  7/11/2025 1640 by Sarah Wilks RN  Outcome: Progressing  Flowsheets (Taken 7/11/2025 1625)  Discharge to home or other facility with appropriate resources: Identify barriers to discharge with patient and caregiver

## 2025-07-12 NOTE — PLAN OF CARE
Problem: Safety - Adult  Goal: Free from fall injury  7/12/2025 1014 by Vicki Fan RN  Outcome: Progressing     Problem: Chronic Conditions and Co-morbidities  Goal: Patient's chronic conditions and co-morbidity symptoms are monitored and maintained or improved  7/12/2025 1014 by Vicki Fan RN  Outcome: Progressing     Problem: Discharge Planning  Goal: Discharge to home or other facility with appropriate resources  7/12/2025 1014 by Vicki Fan RN  Outcome: Progressing     Problem: Neurosensory - Adult  Goal: Achieves stable or improved neurological status  7/12/2025 1014 by Vicki Fan RN  Outcome: Progressing     Problem: Neurosensory - Adult  Goal: Absence of seizures  7/12/2025 1014 by Vicki Fan RN  Outcome: Progressing     Problem: Neurosensory - Adult  Goal: Achieves maximal functionality and self care  7/12/2025 1014 by Vicki Fan RN  Outcome: Progressing     Problem: Respiratory - Adult  Goal: Achieves optimal ventilation and oxygenation  7/12/2025 1014 by Vicki Fan RN  Outcome: Progressing

## 2025-07-12 NOTE — PLAN OF CARE
Problem: Safety - Adult  Goal: Free from fall injury  7/12/2025 0601 by Monica Moore RN  Outcome: Progressing  7/12/2025 0600 by Monica Moore RN  Outcome: Progressing  7/11/2025 1640 by Sarah Wilks RN  Outcome: Progressing     Problem: Chronic Conditions and Co-morbidities  Goal: Patient's chronic conditions and co-morbidity symptoms are monitored and maintained or improved  7/12/2025 0601 by Monica Moore RN  Outcome: Progressing  7/12/2025 0600 by Monica Moore RN  Outcome: Progressing  7/11/2025 1640 by Sarah Wilks RN  Outcome: Progressing  Flowsheets (Taken 7/11/2025 1625)  Care Plan - Patient's Chronic Conditions and Co-Morbidity Symptoms are Monitored and Maintained or Improved: Monitor and assess patient's chronic conditions and comorbid symptoms for stability, deterioration, or improvement     Problem: Discharge Planning  Goal: Discharge to home or other facility with appropriate resources  7/12/2025 0601 by Monica Moore RN  Outcome: Progressing  7/12/2025 0600 by Monica Moore RN  Outcome: Progressing  7/11/2025 1640 by Sarah Wilks RN  Outcome: Progressing  Flowsheets (Taken 7/11/2025 1625)  Discharge to home or other facility with appropriate resources: Identify barriers to discharge with patient and caregiver     Problem: Neurosensory - Adult  Goal: Achieves stable or improved neurological status  Outcome: Progressing  Flowsheets (Taken 7/11/2025 1608 by Sarah Wilks RN)  Achieves stable or improved neurological status: Assess for and report changes in neurological status  Goal: Absence of seizures  Outcome: Progressing  Goal: Achieves maximal functionality and self care  Outcome: Progressing  Flowsheets (Taken 7/11/2025 1608 by Sarah Wilks RN)  Achieves maximal functionality and self care: Monitor swallowing and airway patency with patient fatigue and changes in neurological status     Problem: Respiratory - Adult  Goal: Achieves optimal ventilation and oxygenation  Outcome:

## 2025-07-12 NOTE — PROGRESS NOTES
Interventional Neurology Progress Note    Date:7/12/2025       Room:[facility]/017-A  Patient Name:Lulu Werner     YOB: 1935     Age:89 y.o.    Requesting Physician: Sharon Mclaughlin MD     Reason for Consult:  Left Carotid Stenosis       Interval History   Interval History 7/11/25:  Patient denies changes in daily medications, symptoms, or past medical history since neuro intervention clinic visit 7/8/25. The risks and benefits of the procedure were explained to the patient. Risks include, but are not limited to, stroke, hematoma, hemorrhage, infection, nephrotoxicity, allergic reaction, bleeding, and dissection.  Written informed consent was obtained from patient's son and placed in the patient's chart. Patient's son confirms that she took her ASA and Plavix this morning.     Interval History 7/12/25:   No acute events overnight. Patient is maintained on 2 liters nasal cannula. Started on Bumex 1 mg BID by primary team today for pulmonary edema. Patient denies pain to groin site. Neurological exam notable for expressive aphasia.     HPI   Lulu Werner is an 89 year old female with a past medical history significant for atrial fibrillation, hypertension, glaucoma, and CHF who presents to the interventional neurology clinic as a hospital follow up. Patient was originally seen by our service 5/26 for weakness with an NIH of 1. Her CTA from an OSH revealed an occluded left carotid artery. Initial MRI brain WO contrast negative for acute infarct. That night patient had gotten up to use the restroom and developed sudden onset aphasia, right sided weakenss and inability to follow commands. It was recommended for patient's systolic blood pressure be 180-200, IV fluids, 300mg of Plavix, and strict bedrest with patient lying flat. Repeat MRI brain WO contrast revealed acute left hemispheric strokes. Patient has been compliant with Aspirin 81mg and Plavix 75mg daily. She is still having expressive

## 2025-07-12 NOTE — PROGRESS NOTES
Hospitalist Progress Note    Patient:  Lulu Werner      Unit/Bed:8A-17/017-A    YOB: 1935    MRN: [id number]       Acct: [id number]     PCP: No primary care provider on file.    Date of Admission: 7/11/2025    Chief Complaint:  shortness of breath     History Of Present Illness:  Lulu Werner is a 89 y.o. female with PMHx of CHF, CKD, Afib, CVA with internal carotid stenosis, who presents to [facility] with shortness of breath.  Patient had an angiogram completed today for history of CVA with carotid stenosis. Post procedure, she developed shortness of breath. During procedure she received around 1 L of fluid and contrast. CXR was completed that showed evidence of interstitial vascular congestion. Patient reports that her last dose of Bumex was in mid June.  She is denying cough, wheezing, chest pain, palpitations, nausea, vomiting, fever, chills, lightheadedness, dizziness.     Assessment/Plan:     Shortness of breath  Developed SOB post procedure  BMP: minimal hyponatremia. CBC: chronic anemia. BNP: pending.   EKG: A fib without RVR. Non specific T wave abnormality noted.    CXR completed shows increased interstital markings bilaterally that could represent pulmonary edema.  Patient has been holding her Bumex for the last month. Patient also received about 1 L during procedure as well as contrast.  Bumex 2 mg IV given today. Start 1 mg BID tomorrow. Restart PO Bumex on discharge.  Consider Ipratropium if Patient remains short of breath.      Chronic heart failure with preserved ejection fraction, evidence of decompensation  Patient presents with symptoms and evidence indicating volume overload and decompensation of chronic congestive heart failure   Most likely due to holding bumex and fluids/contrast given during procedure.  Last Echo 05/26/25: Normal left ventricular systolic function. EF by visual approximation is 60%. Left ventricle size is normal. Normal wall

## 2025-07-13 LAB
ANION GAP SERPL CALC-SCNC: 15 MEQ/L (ref 8–16)
BUN SERPL-MCNC: 18 MG/DL (ref 8–23)
CALCIUM SERPL-MCNC: 8.9 MG/DL (ref 8.8–10.2)
CHLORIDE SERPL-SCNC: 99 MEQ/L (ref 98–111)
CO2 SERPL-SCNC: 21 MEQ/L (ref 22–29)
CREAT SERPL-MCNC: 1.2 MG/DL (ref 0.5–0.9)
GFR SERPL CREATININE-BSD FRML MDRD: 43 ML/MIN/1.73M2
GLUCOSE SERPL-MCNC: 114 MG/DL (ref 74–109)
MAGNESIUM SERPL-MCNC: 2.2 MG/DL (ref 1.6–2.6)
POTASSIUM SERPL-SCNC: 4.1 MEQ/L (ref 3.5–5.2)
SODIUM SERPL-SCNC: 135 MEQ/L (ref 135–145)
TROPONIN, HIGH SENSITIVITY: 68 NG/L (ref 0–12)

## 2025-07-13 PROCEDURE — 6370000000 HC RX 637 (ALT 250 FOR IP)

## 2025-07-13 PROCEDURE — G0378 HOSPITAL OBSERVATION PER HR: HCPCS

## 2025-07-13 PROCEDURE — 96376 TX/PRO/DX INJ SAME DRUG ADON: CPT

## 2025-07-13 PROCEDURE — 96372 THER/PROPH/DIAG INJ SC/IM: CPT

## 2025-07-13 PROCEDURE — 99232 SBSQ HOSP IP/OBS MODERATE 35: CPT | Performed by: PHYSICIAN ASSISTANT

## 2025-07-13 PROCEDURE — 2500000003 HC RX 250 WO HCPCS

## 2025-07-13 PROCEDURE — 84484 ASSAY OF TROPONIN QUANT: CPT

## 2025-07-13 PROCEDURE — 83735 ASSAY OF MAGNESIUM: CPT

## 2025-07-13 PROCEDURE — 6360000002 HC RX W HCPCS

## 2025-07-13 PROCEDURE — 36415 COLL VENOUS BLD VENIPUNCTURE: CPT

## 2025-07-13 PROCEDURE — 80048 BASIC METABOLIC PNL TOTAL CA: CPT

## 2025-07-13 RX ORDER — ENOXAPARIN SODIUM 100 MG/ML
30 INJECTION SUBCUTANEOUS EVERY 24 HOURS
Status: DISCONTINUED | OUTPATIENT
Start: 2025-07-13 | End: 2025-07-14 | Stop reason: HOSPADM

## 2025-07-13 RX ADMIN — BUMETANIDE 1 MG: 0.25 INJECTION INTRAMUSCULAR; INTRAVENOUS at 08:19

## 2025-07-13 RX ADMIN — ENOXAPARIN SODIUM 30 MG: 100 INJECTION SUBCUTANEOUS at 17:13

## 2025-07-13 RX ADMIN — ATORVASTATIN CALCIUM 80 MG: 80 TABLET, FILM COATED ORAL at 21:10

## 2025-07-13 RX ADMIN — POTASSIUM CHLORIDE 10 MEQ: 1500 TABLET, EXTENDED RELEASE ORAL at 08:19

## 2025-07-13 RX ADMIN — SODIUM CHLORIDE, PRESERVATIVE FREE 10 ML: 5 INJECTION INTRAVENOUS at 21:11

## 2025-07-13 RX ADMIN — METOPROLOL 100 MG: 100 TABLET ORAL at 21:10

## 2025-07-13 RX ADMIN — SODIUM CHLORIDE, PRESERVATIVE FREE 10 ML: 5 INJECTION INTRAVENOUS at 08:20

## 2025-07-13 RX ADMIN — METOPROLOL 100 MG: 100 TABLET ORAL at 08:19

## 2025-07-13 RX ADMIN — DILTIAZEM HYDROCHLORIDE 180 MG: 180 CAPSULE, EXTENDED RELEASE ORAL at 08:19

## 2025-07-13 RX ADMIN — LATANOPROST 1 DROP: 50 SOLUTION OPHTHALMIC at 21:10

## 2025-07-13 RX ADMIN — ASPIRIN 81 MG: 81 TABLET, CHEWABLE ORAL at 08:19

## 2025-07-13 NOTE — PLAN OF CARE
Problem: Safety - Adult  Goal: Free from fall injury  7/13/2025 1224 by Vicki Fan RN  Outcome: Progressing     Problem: Chronic Conditions and Co-morbidities  Goal: Patient's chronic conditions and co-morbidity symptoms are monitored and maintained or improved  7/13/2025 1224 by Vicki Fan RN  Outcome: Progressing     Problem: Discharge Planning  Goal: Discharge to home or other facility with appropriate resources  7/13/2025 1224 by Vicki Fan RN  Outcome: Progressing     Problem: Neurosensory - Adult  Goal: Achieves stable or improved neurological status  7/13/2025 1224 by Vicki Fan RN  Outcome: Progressing     Problem: Neurosensory - Adult  Goal: Achieves maximal functionality and self care  7/13/2025 1224 by Vicki Fan RN  Outcome: Progressing     Problem: Respiratory - Adult  Goal: Achieves optimal ventilation and oxygenation  7/13/2025 1224 by Vicki Fan RN  Outcome: Progressing

## 2025-07-13 NOTE — PROGRESS NOTES
Hospitalist Progress Note      Patient:  Lulu Werner 89 y.o. female     : 1935  Unit/Bed:[address] [place] [address]-A  Date of Admission: 2025        ASSESSMENT AND PLAN    Active Problems  Acute exacerbation of HFpEF with acute hypoxic respiratory failure-improved  Placed on 2 L nasal cannula secondary to tachypnea and hypoxia.  Currently weaned to room air  CXR with increased interstitial markings  Plan on Bumex at home.  Also received fluid bolus during angiogram on day of admission  Status post Bumex 2 mg IV on admission.  Bumex 1 mg IV twice daily.  Transition to oral Bumex  Fluid and salt restrictions, I's and O's, daily standing weights.  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 Sequela of expressive aphasia occasionally.  Had angiogram done 2025 with Dr. Floyd for evaluation.   Follow up with neurology OP after D/C     Stage 3a chronic kidney disease  Creatinine remains at baseline  Avoid nephrotoxic agents as possible.  Small increase in creatinine to be expected due to contrast.     Primary hypertension  Managed at home with diltiazem, metoprolol, and Bumex  Hydralazine as needed      Chronic Problems (Reviewed and stable unless noted. Increase case complexity)     Hyperlipidemia: atorvastatin     Gastroesophageal reflux disease: Famotidine. Held     Vitamin D deficiency     GAVE (gastric antral vascular ectasia)      DVT Prophylaxis: [x] Lovenox / [] Heparin / [] SCDs / [] Already on Systemic Anticoagulation / [] None     Expected discharge date: 2 days  Disposition: home   Code status: Full Code     ===================================================================    Chief  no

## 2025-07-13 NOTE — PROGRESS NOTES
Pharmacist Review and Automatic Dose Adjustment of Prophylactic Enoxaparin or Heparin    Reviewed reason for admission/hospital problem list    The reviewing pharmacist has made an adjustment to the ordered enoxaparin or heparin dose or converted to heparin per the approved [facility] protocol and table as identified below.      Recent Labs     07/11/25  0808 07/12/25  0607 07/13/25  0530   CREATININE 1.0* 1.1* 1.2*     Estimated Creatinine Clearance: 27 mL/min (A) (based on SCr of 1.2 mg/dL (H)).    Recent Labs     07/11/25  0808 07/12/25  0607   HGB 11.9* 11.7*   HCT 37.0 36.8*    244     Recent Labs     07/11/25  0808   INR 1.19*       Height:   Ht Readings from Last 1 Encounters:   07/11/25 1.626 m (5' 4\")     Weight:  Wt Readings from Last 1 Encounters:   07/13/25 59.3 kg (130 lb 11.7 oz)       *Do not exceed enoxaparin 40mg daily or UFH 5000 units SUBQ TID in patients with epidurals,  lumbar drains, or external ventricular drains.    Plan:   Changed enoxaparin 40 mg subq daily to enoxaparin 30 mg subq daily.     Thank you,  Freda MEJIA.Ph., BCPS., 7/13/2025,11:47 AM

## 2025-07-13 NOTE — PLAN OF CARE
Problem: Safety - Adult  Goal: Free from fall injury  7/12/2025 2358 by Monica Moore RN  Outcome: Progressing  7/12/2025 1014 by Vicki Fan RN  Outcome: Progressing     Problem: Chronic Conditions and Co-morbidities  Goal: Patient's chronic conditions and co-morbidity symptoms are monitored and maintained or improved  7/12/2025 2358 by Monica Moore RN  Outcome: Progressing  7/12/2025 1014 by Vicki Fan RN  Outcome: Progressing     Problem: Discharge Planning  Goal: Discharge to home or other facility with appropriate resources  7/12/2025 2358 by Monica Moore RN  Outcome: Progressing  7/12/2025 1014 by Vicki Fan RN  Outcome: Progressing     Problem: Neurosensory - Adult  Goal: Achieves stable or improved neurological status  7/12/2025 2358 by Monica Moore RN  Outcome: Progressing  7/12/2025 1014 by Vicki Fan RN  Outcome: Progressing  Goal: Absence of seizures  7/12/2025 2358 by Monica Moore RN  Outcome: Progressing  7/12/2025 1014 by Vicki Fan RN  Outcome: Progressing  Goal: Achieves maximal functionality and self care  7/12/2025 2358 by Monica Moore RN  Outcome: Progressing  7/12/2025 1014 by Vicki Fan RN  Outcome: Progressing     Problem: Respiratory - Adult  Goal: Achieves optimal ventilation and oxygenation  7/12/2025 2358 by Monica Moore RN  Outcome: Progressing  7/12/2025 1014 by Vicki Fan RN  Outcome: Progressing     Problem: Cardiovascular - Adult  Goal: Maintains optimal cardiac output and hemodynamic stability  7/12/2025 2358 by Monica Moore RN  Outcome: Progressing  7/12/2025 1014 by Vicki Fan RN  Outcome: Progressing  Goal: Absence of cardiac dysrhythmias or at baseline  7/12/2025 2358 by Monica Moore RN  Outcome: Progressing  7/12/2025 1014 by Vicki Fan RN  Outcome: Progressing     Problem: Musculoskeletal - Adult  Goal: Return mobility to safest level of function  7/12/2025 2358 by Monica Moore RN  Outcome: Progressing  7/12/2025 1014 by Vicki Fan RN  Outcome:

## 2025-07-14 VITALS
HEART RATE: 55 BPM | RESPIRATION RATE: 16 BRPM | OXYGEN SATURATION: 96 % | SYSTOLIC BLOOD PRESSURE: 106 MMHG | TEMPERATURE: 97.9 F | DIASTOLIC BLOOD PRESSURE: 79 MMHG | WEIGHT: 132.72 LBS | HEIGHT: 64 IN | BODY MASS INDEX: 22.66 KG/M2

## 2025-07-14 PROBLEM — I50.9 ACUTE ON CHRONIC CONGESTIVE HEART FAILURE (HCC): Status: ACTIVE | Noted: 2025-07-14

## 2025-07-14 LAB
ANION GAP SERPL CALC-SCNC: 11 MEQ/L (ref 8–16)
BUN SERPL-MCNC: 18 MG/DL (ref 8–23)
CALCIUM SERPL-MCNC: 8.7 MG/DL (ref 8.8–10.2)
CHLORIDE SERPL-SCNC: 102 MEQ/L (ref 98–111)
CO2 SERPL-SCNC: 23 MEQ/L (ref 22–29)
CREAT SERPL-MCNC: 1 MG/DL (ref 0.5–0.9)
GFR SERPL CREATININE-BSD FRML MDRD: 54 ML/MIN/1.73M2
GLUCOSE SERPL-MCNC: 116 MG/DL (ref 74–109)
MAGNESIUM SERPL-MCNC: 2.1 MG/DL (ref 1.6–2.6)
POTASSIUM SERPL-SCNC: 3.4 MEQ/L (ref 3.5–5.2)
SODIUM SERPL-SCNC: 136 MEQ/L (ref 135–145)

## 2025-07-14 PROCEDURE — 6370000000 HC RX 637 (ALT 250 FOR IP)

## 2025-07-14 PROCEDURE — G0378 HOSPITAL OBSERVATION PER HR: HCPCS

## 2025-07-14 PROCEDURE — 36415 COLL VENOUS BLD VENIPUNCTURE: CPT

## 2025-07-14 PROCEDURE — 99239 HOSP IP/OBS DSCHRG MGMT >30: CPT | Performed by: PHYSICIAN ASSISTANT

## 2025-07-14 PROCEDURE — 80048 BASIC METABOLIC PNL TOTAL CA: CPT

## 2025-07-14 PROCEDURE — 6370000000 HC RX 637 (ALT 250 FOR IP): Performed by: PHYSICIAN ASSISTANT

## 2025-07-14 PROCEDURE — 83735 ASSAY OF MAGNESIUM: CPT

## 2025-07-14 PROCEDURE — 2500000003 HC RX 250 WO HCPCS

## 2025-07-14 RX ORDER — POTASSIUM CHLORIDE 1500 MG/1
20 TABLET, EXTENDED RELEASE ORAL DAILY
Qty: 30 TABLET | Refills: 3 | Status: SHIPPED | OUTPATIENT
Start: 2025-07-15

## 2025-07-14 RX ORDER — POTASSIUM CHLORIDE 1500 MG/1
20 TABLET, EXTENDED RELEASE ORAL DAILY
Status: DISCONTINUED | OUTPATIENT
Start: 2025-07-15 | End: 2025-07-14 | Stop reason: HOSPADM

## 2025-07-14 RX ADMIN — POTASSIUM CHLORIDE 40 MEQ: 1500 TABLET, EXTENDED RELEASE ORAL at 08:28

## 2025-07-14 RX ADMIN — SODIUM CHLORIDE, PRESERVATIVE FREE 10 ML: 5 INJECTION INTRAVENOUS at 08:27

## 2025-07-14 RX ADMIN — ASPIRIN 81 MG: 81 TABLET, CHEWABLE ORAL at 08:28

## 2025-07-14 RX ADMIN — BUMETANIDE 1 MG: 1 TABLET ORAL at 08:28

## 2025-07-14 RX ADMIN — METOPROLOL 100 MG: 100 TABLET ORAL at 08:28

## 2025-07-14 RX ADMIN — DILTIAZEM HYDROCHLORIDE 180 MG: 180 CAPSULE, EXTENDED RELEASE ORAL at 08:27

## 2025-07-14 NOTE — DISCHARGE INSTRUCTIONS
Continue with cardiology and neurology follow-ups as scheduled in August      Discharge instructions for Cardiac Cath Groin Approach  1.  Take it easy for 3-4 days.  2.  No driving for 2 days.  3.  No lifting of 5 lbs or more for 5 days. - this is a gallon of milk.  4.  Can shower after 24 hours.  5.  Remove dressing in shower after 24 hours.  6.  Apply a clean band aid for 5 days over the incision with antibiotic ointment to site, then leave open.  7.  No creams, ointments, or powders near site.  8.  No bath tubs, swimming, or hot tubs for one week.  9.  Take stairs with unaffected side first.  10.  Watch for signs of infection - redness, increased pain, elevated temperature.  11.  If bleeding from incision, apply pressure and call 911.    Groin Care Instructions        Normal Observation: You may or may not experience these.    Soreness or tenderness that may last a few weeks.    Possible bruising that could last a few weeks and up to one month.   Formation of a small lump (dime to quarter size) that should last only a few weeks.    Care of your incision  You may shower 24 hours after the procedure. Wet the dressing thoroughly and gently remove the bandage from the hospital during showering. It is easier to remove this way.             Gently clean your site daily using soap and water while standing in the shower. Dry thoroughly.   Do not apply powders or lotions to the site for 2 weeks.   Keep the site clean and dry to prevent infection.    Do not sit in a bathtub or a pool of water for 7 days.    Inspect the site daily.    Activity   You may resume normal activity in 2 days, including driving, letting pain be your     guide.   Limit lifting over 5 pounds (half gallon of milk) to one week or until site heals.  Limit vigorous activity (contact sports) to two weeks time.  You will be able to return to work in 1-3 days.    Call our office immediately if you experience any of the following…  Significant bleeding does

## 2025-07-14 NOTE — PLAN OF CARE
Problem: Safety - Adult  Goal: Free from fall injury  7/14/2025 1022 by Vicki Fan RN  Outcome: Progressing     Problem: Chronic Conditions and Co-morbidities  Goal: Patient's chronic conditions and co-morbidity symptoms are monitored and maintained or improved  7/14/2025 1022 by Vicki Fan RN  Outcome: Progressing     Problem: Discharge Planning  Goal: Discharge to home or other facility with appropriate resources  7/14/2025 1022 by Vicki Fan RN  Outcome: Progressing     Problem: Neurosensory - Adult  Goal: Achieves stable or improved neurological status  7/14/2025 1022 by Vicki aFn RN  Outcome: Progressing     Problem: Neurosensory - Adult  Goal: Absence of seizures  7/14/2025 1022 by Vicki Fan RN  Outcome: Progressing     Problem: Neurosensory - Adult  Goal: Achieves maximal functionality and self care  7/14/2025 1022 by Vicki Fan RN  Outcome: Progressing     Problem: Respiratory - Adult  Goal: Achieves optimal ventilation and oxygenation  7/14/2025 1022 by Vicki Fan RN  Outcome: Progressing     Problem: Cardiovascular - Adult  Goal: Maintains optimal cardiac output and hemodynamic stability  7/14/2025 1022 by Vicki Fan RN  Outcome: Progressing     Problem: Cardiovascular - Adult  Goal: Absence of cardiac dysrhythmias or at baseline  7/14/2025 1022 by Vicki Fan RN  Outcome: Progressing     Problem: Musculoskeletal - Adult  Goal: Return mobility to safest level of function  7/14/2025 1022 by Vicki Fan RN  Outcome: Progressing     Problem: Musculoskeletal - Adult  Goal: Maintain proper alignment of affected body part  7/14/2025 1022 by Vicki Fan RN  Outcome: Progressing     Problem: Musculoskeletal - Adult  Goal: Return ADL status to a safe level of function  7/14/2025 1022 by Vicki Fan RN  Outcome: Progressing     Problem: Anxiety  Goal: Will report anxiety at manageable levels  Description: INTERVENTIONS:  1. Administer medication as ordered  2. Teach

## 2025-07-14 NOTE — PLAN OF CARE
Problem: Safety - Adult  Goal: Free from fall injury  7/14/2025 0202 by Ammy Pride RN  Outcome: Progressing  7/13/2025 1224 by Vicki Fan RN  Outcome: Progressing     Problem: Chronic Conditions and Co-morbidities  Goal: Patient's chronic conditions and co-morbidity symptoms are monitored and maintained or improved  7/14/2025 0202 by Ammy Pride RN  Outcome: Progressing  7/13/2025 1224 by Vicki Fan RN  Outcome: Progressing     Problem: Discharge Planning  Goal: Discharge to home or other facility with appropriate resources  7/14/2025 0202 by Ammy Pride RN  Outcome: Progressing  7/13/2025 1224 by Vicki Fan RN  Outcome: Progressing     Problem: Neurosensory - Adult  Goal: Achieves stable or improved neurological status  7/14/2025 0202 by Ammy Pride RN  Outcome: Progressing  7/13/2025 1224 by Vicki Fan RN  Outcome: Progressing  Goal: Absence of seizures  7/14/2025 0202 by Ammy Pride RN  Outcome: Progressing  7/13/2025 1224 by Vicki Fan RN  Outcome: Progressing  Goal: Achieves maximal functionality and self care  7/14/2025 0202 by Ammy Pride RN  Outcome: Progressing  7/13/2025 1224 by Vicki Fan RN  Outcome: Progressing     Problem: Respiratory - Adult  Goal: Achieves optimal ventilation and oxygenation  7/14/2025 0202 by Ammy Pride RN  Outcome: Progressing  7/13/2025 1224 by Vicki Fan RN  Outcome: Progressing     Problem: Cardiovascular - Adult  Goal: Maintains optimal cardiac output and hemodynamic stability  7/14/2025 0202 by Ammy Pride RN  Outcome: Progressing  7/13/2025 1224 by Vicki Fan RN  Outcome: Progressing  Goal: Absence of cardiac dysrhythmias or at baseline  7/14/2025 0202 by Ammy Pride RN  Outcome: Progressing  7/13/2025 1224 by Vicki Fan RN  Outcome: Progressing     Problem: Musculoskeletal - Adult  Goal: Return mobility to safest level of function  7/14/2025 0202 by Ammy Pride

## 2025-07-14 NOTE — PROGRESS NOTES
HEART FAILURE  / CONGESTIVE HEART FAILURE  DISCHARGE INSTRUCTIONS:  GUIDELINES TO FOLLOW AT HOME    Self- Managed Care:     MEDICATIONS:  Take your medication as directed. If you are experiencing any side effects, inform your doctor, Do not stop taking any of your medications without letting your doctor know.   Check with your doctor before taking any over-the-counter medications / herbal / or dietary supplements. They may interfere with your other medications.  Do not take ibuprofen (Advil or Motrin) and naproxen (Aleve) without talking to your doctor first. They could make your heart failure worse.         WEIGHT MONITORING:   Weigh yourself everyday (with the same scale) around the same time of the day and write it down. (you can chart them on a calendar or keep track of them on paper.   Notify your doctor of a weight gain of 3 pounds or more in 1 day   OR a total of 5 pounds or more in 1 week    Take your weight record to your doctor visits  Also, the same goes if you loose more than 3 pounds in one day, let your heart doctor know.         DIET:   Cardiac heart healthy diet- Low saturated / low trans fat, no added salt, caffeine restricted, Low sodium diet-   No more than 2,000mg (2 grams) of salt / sodium per day (which equals to a little less than  a teaspoon of salt)  If your doctor wants you on a fluid restriction...it is usually recommended a fluid limit of 2,000ml -  Fluid restriction- 2,000 ml (milliliters) = 64 ounces = you can have 8 glasses of fluid per day (each glass 8 ounces)    Follow a low salt diet - avoid using salt at the table, avoid / limit use of canned soups, processed / packaged foods, salted snacks, olives and pickles.  Do not use a salt substitute without checking with your doctor, they may contain a high amount of potassioum. (Mrs. Dash is safe to use).    Limit the use of alcohol       CALL YOUR DOCTOR THE FIRST DAY YOU NOTICE ANY OF THESE   SYMPTOMS:  You have a

## 2025-07-16 ENCOUNTER — FOLLOWUP TELEPHONE ENCOUNTER (OUTPATIENT)
Dept: ADMINISTRATIVE | Age: 89
End: 2025-07-16

## 2025-08-13 ENCOUNTER — OFFICE VISIT (OUTPATIENT)
Dept: NEUROLOGY | Age: 89
End: 2025-08-13

## 2025-08-13 VITALS
RESPIRATION RATE: 12 BRPM | WEIGHT: 127 LBS | HEIGHT: 64 IN | SYSTOLIC BLOOD PRESSURE: 105 MMHG | DIASTOLIC BLOOD PRESSURE: 70 MMHG | BODY MASS INDEX: 21.68 KG/M2

## 2025-08-13 DIAGNOSIS — H53.40 VISUAL FIELD CUT: ICD-10-CM

## 2025-08-13 DIAGNOSIS — I63.89 OTHER CEREBRAL INFARCTION (HCC): ICD-10-CM

## 2025-08-13 DIAGNOSIS — R47.01 APHASIA: ICD-10-CM

## 2025-08-13 DIAGNOSIS — I69.30 HISTORY OF STROKE WITH RESIDUAL DEFICIT: Primary | ICD-10-CM

## 2025-08-14 ENCOUNTER — OFFICE VISIT (OUTPATIENT)
Dept: CARDIOLOGY CLINIC | Age: 89
End: 2025-08-14
Payer: MEDICARE

## 2025-08-14 VITALS
DIASTOLIC BLOOD PRESSURE: 72 MMHG | SYSTOLIC BLOOD PRESSURE: 107 MMHG | WEIGHT: 126 LBS | HEIGHT: 64 IN | HEART RATE: 62 BPM | BODY MASS INDEX: 21.51 KG/M2 | OXYGEN SATURATION: 99 %

## 2025-08-14 DIAGNOSIS — I73.89 ACROCYANOSIS: ICD-10-CM

## 2025-08-14 DIAGNOSIS — I10 PRIMARY HYPERTENSION: ICD-10-CM

## 2025-08-14 DIAGNOSIS — K31.819 GAVE (GASTRIC ANTRAL VASCULAR ECTASIA): ICD-10-CM

## 2025-08-14 DIAGNOSIS — Z86.73 HISTORY OF CVA (CEREBROVASCULAR ACCIDENT): ICD-10-CM

## 2025-08-14 DIAGNOSIS — E78.00 PURE HYPERCHOLESTEROLEMIA: ICD-10-CM

## 2025-08-14 DIAGNOSIS — I48.20 ATRIAL FIBRILLATION, CHRONIC (HCC): ICD-10-CM

## 2025-08-14 DIAGNOSIS — I50.32 CHRONIC HEART FAILURE WITH PRESERVED EJECTION FRACTION (HFPEF) (HCC): Primary | ICD-10-CM

## 2025-08-14 DIAGNOSIS — R60.0 EDEMA OF LEFT LOWER EXTREMITY: ICD-10-CM

## 2025-08-14 PROBLEM — I48.0 PAROXYSMAL ATRIAL FIBRILLATION (HCC): Status: ACTIVE | Noted: 2025-03-26

## 2025-08-14 PROCEDURE — 99214 OFFICE O/P EST MOD 30 MIN: CPT | Performed by: INTERNAL MEDICINE

## 2025-08-14 PROCEDURE — 1090F PRES/ABSN URINE INCON ASSESS: CPT | Performed by: INTERNAL MEDICINE

## 2025-08-14 PROCEDURE — 1123F ACP DISCUSS/DSCN MKR DOCD: CPT | Performed by: INTERNAL MEDICINE

## 2025-08-14 PROCEDURE — 1159F MED LIST DOCD IN RCRD: CPT | Performed by: INTERNAL MEDICINE

## 2025-08-14 PROCEDURE — G8420 CALC BMI NORM PARAMETERS: HCPCS | Performed by: INTERNAL MEDICINE

## 2025-08-14 PROCEDURE — G8427 DOCREV CUR MEDS BY ELIG CLIN: HCPCS | Performed by: INTERNAL MEDICINE

## 2025-08-14 PROCEDURE — 1160F RVW MEDS BY RX/DR IN RCRD: CPT | Performed by: INTERNAL MEDICINE

## 2025-08-14 PROCEDURE — 1036F TOBACCO NON-USER: CPT | Performed by: INTERNAL MEDICINE

## 2025-08-18 ENCOUNTER — HOSPITAL ENCOUNTER (OUTPATIENT)
Dept: INTERVENTIONAL RADIOLOGY/VASCULAR | Age: 89
Discharge: HOME | End: 2025-08-20
Attending: INTERNAL MEDICINE
Payer: MEDICARE

## 2025-08-18 ENCOUNTER — HOSPITAL ENCOUNTER (OUTPATIENT)
Dept: OTHER | Age: 89
Discharge: HOME | End: 2025-08-18
Payer: MEDICARE

## 2025-08-18 DIAGNOSIS — I48.20 ATRIAL FIBRILLATION, CHRONIC (HCC): ICD-10-CM

## 2025-08-18 DIAGNOSIS — K31.819 GAVE (GASTRIC ANTRAL VASCULAR ECTASIA): ICD-10-CM

## 2025-08-18 DIAGNOSIS — I73.89 ACROCYANOSIS: ICD-10-CM

## 2025-08-18 DIAGNOSIS — R60.0 EDEMA OF LEFT LOWER EXTREMITY: ICD-10-CM

## 2025-08-18 DIAGNOSIS — I63.89 OTHER CEREBRAL INFARCTION (HCC): ICD-10-CM

## 2025-08-18 DIAGNOSIS — I10 PRIMARY HYPERTENSION: ICD-10-CM

## 2025-08-18 DIAGNOSIS — E78.00 PURE HYPERCHOLESTEROLEMIA: ICD-10-CM

## 2025-08-18 DIAGNOSIS — I50.32 CHRONIC HEART FAILURE WITH PRESERVED EJECTION FRACTION (HFPEF) (HCC): ICD-10-CM

## 2025-08-18 DIAGNOSIS — I69.30 HISTORY OF STROKE WITH RESIDUAL DEFICIT: ICD-10-CM

## 2025-08-18 DIAGNOSIS — R47.01 APHASIA: ICD-10-CM

## 2025-08-18 DIAGNOSIS — Z86.73 HISTORY OF CVA (CEREBROVASCULAR ACCIDENT): ICD-10-CM

## 2025-08-18 LAB — HOMOCYSTEINE: 21.4 UMOL/L (ref 0–15)

## 2025-08-18 PROCEDURE — 83090 ASSAY OF HOMOCYSTEINE: CPT

## 2025-08-18 PROCEDURE — 93925 LOWER EXTREMITY STUDY: CPT

## 2025-08-18 PROCEDURE — 36415 COLL VENOUS BLD VENIPUNCTURE: CPT

## 2025-08-20 ENCOUNTER — OFFICE VISIT (OUTPATIENT)
Dept: PHYSICAL MEDICINE AND REHAB | Age: 89
End: 2025-08-20
Payer: MEDICARE

## 2025-08-20 VITALS
BODY MASS INDEX: 21.53 KG/M2 | WEIGHT: 126.1 LBS | SYSTOLIC BLOOD PRESSURE: 122 MMHG | HEIGHT: 64 IN | DIASTOLIC BLOOD PRESSURE: 80 MMHG

## 2025-08-20 DIAGNOSIS — Z86.73 HISTORY OF CVA (CEREBROVASCULAR ACCIDENT): Primary | ICD-10-CM

## 2025-08-20 DIAGNOSIS — Z74.09 IMPAIRED MOBILITY AND ADLS: ICD-10-CM

## 2025-08-20 DIAGNOSIS — R47.01 APHASIA: ICD-10-CM

## 2025-08-20 DIAGNOSIS — Z78.9 IMPAIRED MOBILITY AND ADLS: ICD-10-CM

## 2025-08-20 PROCEDURE — 1159F MED LIST DOCD IN RCRD: CPT | Performed by: STUDENT IN AN ORGANIZED HEALTH CARE EDUCATION/TRAINING PROGRAM

## 2025-08-20 PROCEDURE — 1090F PRES/ABSN URINE INCON ASSESS: CPT | Performed by: STUDENT IN AN ORGANIZED HEALTH CARE EDUCATION/TRAINING PROGRAM

## 2025-08-20 PROCEDURE — 1123F ACP DISCUSS/DSCN MKR DOCD: CPT | Performed by: STUDENT IN AN ORGANIZED HEALTH CARE EDUCATION/TRAINING PROGRAM

## 2025-08-20 PROCEDURE — 99214 OFFICE O/P EST MOD 30 MIN: CPT | Performed by: STUDENT IN AN ORGANIZED HEALTH CARE EDUCATION/TRAINING PROGRAM

## 2025-08-20 PROCEDURE — G8420 CALC BMI NORM PARAMETERS: HCPCS | Performed by: STUDENT IN AN ORGANIZED HEALTH CARE EDUCATION/TRAINING PROGRAM

## 2025-08-20 PROCEDURE — 1036F TOBACCO NON-USER: CPT | Performed by: STUDENT IN AN ORGANIZED HEALTH CARE EDUCATION/TRAINING PROGRAM

## 2025-08-20 PROCEDURE — G8427 DOCREV CUR MEDS BY ELIG CLIN: HCPCS | Performed by: STUDENT IN AN ORGANIZED HEALTH CARE EDUCATION/TRAINING PROGRAM

## 2025-08-21 RX ORDER — FOLIC ACID 1 MG/1
1 TABLET ORAL DAILY
Qty: 90 TABLET | Refills: 3 | Status: SHIPPED | OUTPATIENT
Start: 2025-08-21

## (undated) DEVICE — FIAPC® PROBE W/ FILTER 2200 C OD 2.3MM/6.9FR; L 2.2M/7.2FT: Brand: ERBE

## (undated) DEVICE — SYSTEM CLOSURE 6-12 FR VEN VASC VASCADE MVP

## (undated) DEVICE — OFF - ST. RITAS VASC: Brand: MEDLINE INDUSTRIES, INC.

## (undated) DEVICE — CHECK-FLO PERFORMER INTRODUCER: Brand: PERFORMER

## (undated) DEVICE — HI-TORQUE SUPRA CORE .035 PERIPHERAL GUIDE WIRE .035 X 190 CM: Brand: HI-TORQUE SUPRA CORE

## (undated) DEVICE — 1 X VERSACROSS CONNECT TRANSSEPTAL DILATOR (INCLUDING 1 X J-TIP MECHANICAL GUIDEWIRE); 1 X VERSACROSS RF WIRE (INCLUDING 1 X CONNECTOR CABLE (SINGLE USE)); 1 X DISPERSIVE ELECTRODE: Brand: VERSACROSS CONNECT LAAC ACCESS SOLUTION

## (undated) DEVICE — 4F (1.0MM ID) X 9CM STIFF4F (1.0 MICRO-STICK®INTRODUCER SE WITH NITINOL GUIDEWIREWITH NITIN WITH RADIOPAQUE TIPWITH RADIOPAQ: Brand: MICRO-STICK SETMICRO-STICK SET

## (undated) DEVICE — PINNACLE INTRODUCER SHEATH: Brand: PINNACLE

## (undated) DEVICE — ACCESS SHEATH WITH DILATOR: Brand: WATCHMAN FXD CURVE™ ACCESS SYSTEM